# Patient Record
Sex: MALE | Race: BLACK OR AFRICAN AMERICAN | NOT HISPANIC OR LATINO | Employment: OTHER | URBAN - METROPOLITAN AREA
[De-identification: names, ages, dates, MRNs, and addresses within clinical notes are randomized per-mention and may not be internally consistent; named-entity substitution may affect disease eponyms.]

---

## 2017-01-03 ENCOUNTER — HISTORICAL (OUTPATIENT)
Dept: ADMINISTRATIVE | Facility: HOSPITAL | Age: 57
End: 2017-01-03

## 2017-01-14 ENCOUNTER — HISTORICAL (OUTPATIENT)
Dept: ADMINISTRATIVE | Facility: HOSPITAL | Age: 57
End: 2017-01-14

## 2017-02-09 ENCOUNTER — HISTORICAL (OUTPATIENT)
Dept: ADMINISTRATIVE | Facility: HOSPITAL | Age: 57
End: 2017-02-09

## 2017-03-15 LAB — D DIMER PPP IA.FEU-MCNC: 0.38 MCG/ML FEU

## 2017-03-17 ENCOUNTER — HISTORICAL (OUTPATIENT)
Dept: ADMINISTRATIVE | Facility: HOSPITAL | Age: 57
End: 2017-03-17

## 2017-04-04 ENCOUNTER — HISTORICAL (OUTPATIENT)
Dept: ADMINISTRATIVE | Facility: HOSPITAL | Age: 57
End: 2017-04-04

## 2017-04-10 ENCOUNTER — HISTORICAL (OUTPATIENT)
Dept: ADMINISTRATIVE | Facility: HOSPITAL | Age: 57
End: 2017-04-10

## 2017-05-11 ENCOUNTER — HISTORICAL (OUTPATIENT)
Dept: ADMINISTRATIVE | Facility: HOSPITAL | Age: 57
End: 2017-05-11

## 2017-05-11 LAB
ABS NEUT (OLG): 3.4 X10(3)/MCL (ref 2.1–9.2)
ALBUMIN SERPL-MCNC: 3.6 GM/DL (ref 3.4–5)
ALBUMIN/GLOB SERPL: 1 {RATIO}
ALP SERPL-CCNC: 61 UNIT/L (ref 20–120)
ALT SERPL-CCNC: 69 UNIT/L
AST SERPL-CCNC: 77 UNIT/L
BASOPHILS # BLD AUTO: 0.03 X10(3)/MCL
BASOPHILS NFR BLD AUTO: 0 % (ref 0–1)
BILIRUB SERPL-MCNC: 1 MG/DL
BILIRUBIN DIRECT+TOT PNL SERPL-MCNC: 0.1 MG/DL
BILIRUBIN DIRECT+TOT PNL SERPL-MCNC: 0.9 MG/DL
BNP BLD-MCNC: 1610 PG/ML (ref 0–100)
BUN SERPL-MCNC: 18 MG/DL (ref 7–25)
CALCIUM SERPL-MCNC: 8.6 MG/DL (ref 8.4–10.3)
CHLORIDE SERPL-SCNC: 109 MMOL/L (ref 96–110)
CO2 SERPL-SCNC: 25 MMOL/L (ref 24–32)
CREAT SERPL-MCNC: 1.26 MG/DL (ref 0.7–1.4)
EOSINOPHIL # BLD AUTO: 0.08 X10(3)/MCL
EOSINOPHIL NFR BLD AUTO: 1 % (ref 0–5)
ERYTHROCYTE [DISTWIDTH] IN BLOOD BY AUTOMATED COUNT: 16.5 % (ref 11.5–14.5)
GLOBULIN SER-MCNC: 3.1 GM/ML (ref 2.3–3.5)
GLUCOSE SERPL-MCNC: 93 MG/DL (ref 65–99)
HCT VFR BLD AUTO: 31.7 % (ref 40–51)
HGB BLD-MCNC: 10.3 GM/DL (ref 13.5–17.5)
IMM GRANULOCYTES # BLD AUTO: 0.01 10*3/UL
IMM GRANULOCYTES NFR BLD AUTO: 0 %
LYMPHOCYTES # BLD AUTO: 1.68 X10(3)/MCL
LYMPHOCYTES NFR BLD AUTO: 30 % (ref 15–40)
MAGNESIUM SERPL-MCNC: 1.7 MG/DL (ref 1.5–2.6)
MCH RBC QN AUTO: 29.6 PG (ref 26–34)
MCHC RBC AUTO-ENTMCNC: 32.5 GM/DL (ref 31–37)
MCV RBC AUTO: 91.1 FL (ref 80–100)
MONOCYTES # BLD AUTO: 0.38 X10(3)/MCL
MONOCYTES NFR BLD AUTO: 7 % (ref 4–12)
NEUTROPHILS # BLD AUTO: 3.4 X10(3)/MCL
NEUTROPHILS NFR BLD AUTO: 61 X10(3)/MCL
PLATELET # BLD AUTO: 235 X10(3)/MCL (ref 130–400)
PMV BLD AUTO: 9.4 FL (ref 7.4–10.4)
POC TROPONIN: 0.08 NG/ML (ref 0–0.08)
POTASSIUM SERPL-SCNC: 4 MMOL/L (ref 3.6–5.2)
PROT SERPL-MCNC: 6.7 GM/DL (ref 6–8)
RBC # BLD AUTO: 3.48 X10(6)/MCL (ref 4.5–5.9)
SODIUM SERPL-SCNC: 138 MMOL/L (ref 135–146)
TROPONIN I SERPL-MCNC: 0.1 NG/ML
TROPONIN I SERPL-MCNC: 0.11 NG/ML
WBC # SPEC AUTO: 5.6 X10(3)/MCL (ref 4.5–11)

## 2017-05-12 LAB
ABS NEUT (OLG): 4.23 X10(3)/MCL (ref 2.1–9.2)
BASOPHILS # BLD AUTO: 0.03 X10(3)/MCL
BASOPHILS NFR BLD AUTO: 0 % (ref 0–1)
BUN SERPL-MCNC: 17 MG/DL (ref 7–25)
BUN SERPL-MCNC: 18 MG/DL (ref 7–25)
CALCIUM SERPL-MCNC: 8.3 MG/DL (ref 8.4–10.3)
CALCIUM SERPL-MCNC: 9 MG/DL (ref 8.4–10.3)
CHLORIDE SERPL-SCNC: 102 MMOL/L (ref 96–110)
CHLORIDE SERPL-SCNC: 106 MMOL/L (ref 96–110)
CO2 SERPL-SCNC: 25 MMOL/L (ref 24–32)
CO2 SERPL-SCNC: 29 MMOL/L (ref 24–32)
CREAT SERPL-MCNC: 1.41 MG/DL (ref 0.7–1.4)
CREAT SERPL-MCNC: 1.51 MG/DL (ref 0.7–1.4)
EOSINOPHIL # BLD AUTO: 0.11 X10(3)/MCL
EOSINOPHIL NFR BLD AUTO: 2 % (ref 0–5)
ERYTHROCYTE [DISTWIDTH] IN BLOOD BY AUTOMATED COUNT: 16.4 % (ref 11.5–14.5)
GLUCOSE SERPL-MCNC: 164 MG/DL (ref 65–99)
GLUCOSE SERPL-MCNC: 95 MG/DL (ref 65–99)
HCT VFR BLD AUTO: 30.4 % (ref 40–51)
HGB BLD-MCNC: 9.9 GM/DL (ref 13.5–17.5)
IMM GRANULOCYTES # BLD AUTO: 0.01 10*3/UL
IMM GRANULOCYTES NFR BLD AUTO: 0 %
LYMPHOCYTES # BLD AUTO: 1.94 X10(3)/MCL
LYMPHOCYTES NFR BLD AUTO: 29 % (ref 15–40)
MAGNESIUM SERPL-MCNC: 1.7 MG/DL (ref 1.5–2.6)
MCH RBC QN AUTO: 29.3 PG (ref 26–34)
MCHC RBC AUTO-ENTMCNC: 32.6 GM/DL (ref 31–37)
MCV RBC AUTO: 89.9 FL (ref 80–100)
MONOCYTES # BLD AUTO: 0.33 X10(3)/MCL
MONOCYTES NFR BLD AUTO: 5 % (ref 4–12)
NEUTROPHILS # BLD AUTO: 4.23 X10(3)/MCL
NEUTROPHILS NFR BLD AUTO: 63 X10(3)/MCL
PLATELET # BLD AUTO: 231 X10(3)/MCL (ref 130–400)
PMV BLD AUTO: 10.7 FL (ref 7.4–10.4)
POTASSIUM SERPL-SCNC: 3.2 MMOL/L (ref 3.6–5.2)
POTASSIUM SERPL-SCNC: 4.3 MMOL/L (ref 3.6–5.2)
RBC # BLD AUTO: 3.38 X10(6)/MCL (ref 4.5–5.9)
SODIUM SERPL-SCNC: 138 MMOL/L (ref 135–146)
SODIUM SERPL-SCNC: 138 MMOL/L (ref 135–146)
TROPONIN I SERPL-MCNC: 0.07 NG/ML
TROPONIN I SERPL-MCNC: 0.09 NG/ML
WBC # SPEC AUTO: 6.6 X10(3)/MCL (ref 4.5–11)

## 2017-07-05 ENCOUNTER — HISTORICAL (OUTPATIENT)
Dept: ADMINISTRATIVE | Facility: HOSPITAL | Age: 57
End: 2017-07-05

## 2017-07-05 LAB
ABS NEUT (OLG): 5.01 X10(3)/MCL (ref 2.1–9.2)
ALBUMIN SERPL-MCNC: 3.9 GM/DL (ref 3.4–5)
ALBUMIN/GLOB SERPL: 1 RATIO (ref 1–2)
ALP SERPL-CCNC: 61 UNIT/L (ref 45–117)
ALT SERPL-CCNC: 25 UNIT/L (ref 12–78)
AMPHET UR QL SCN: NEGATIVE
APPEARANCE, UA: CLEAR
APTT PPP: 29.2 SECOND(S) (ref 23.3–37)
AST SERPL-CCNC: 32 UNIT/L (ref 15–37)
BACTERIA #/AREA URNS AUTO: NORMAL /[HPF]
BARBITURATE SCN PRESENT UR: NEGATIVE
BASOPHILS # BLD AUTO: 0.03 X10(3)/MCL
BASOPHILS NFR BLD AUTO: 0 % (ref 0–1)
BENZODIAZ UR QL SCN: NEGATIVE
BILIRUB SERPL-MCNC: 0.6 MG/DL (ref 0.2–1)
BILIRUB UR QL STRIP: NORMAL MG/DL
BILIRUBIN DIRECT+TOT PNL SERPL-MCNC: 0.2 MG/DL
BILIRUBIN DIRECT+TOT PNL SERPL-MCNC: 0.4 MG/DL
BNP BLD-MCNC: 897 PG/ML (ref 0–100)
BUN SERPL-MCNC: 19 MG/DL (ref 7–18)
CALCIUM SERPL-MCNC: 9.2 MG/DL (ref 8.5–10.1)
CANNABINOIDS UR QL SCN: POSITIVE
CHLORIDE SERPL-SCNC: 104 MMOL/L (ref 98–107)
CK MB SERPL-MCNC: 3.9 NG/ML (ref 1–3.6)
CK SERPL-CCNC: 263 UNIT/L (ref 39–308)
CO2 SERPL-SCNC: 27 MMOL/L (ref 21–32)
COCAINE UR QL SCN: POSITIVE
COLOR UR: YELLOW
CREAT SERPL-MCNC: 1.8 MG/DL (ref 0.6–1.3)
EOSINOPHIL # BLD AUTO: 0.02 X10(3)/MCL
EOSINOPHIL NFR BLD AUTO: 0 % (ref 0–5)
ERYTHROCYTE [DISTWIDTH] IN BLOOD BY AUTOMATED COUNT: 18.3 % (ref 11.5–14.5)
GLOBULIN SER-MCNC: 3.9 GM/ML (ref 2.3–3.5)
GLUCOSE (UA): NORMAL MG/DL
GLUCOSE SERPL-MCNC: 94 MG/DL (ref 74–106)
HCT VFR BLD AUTO: 34.3 % (ref 40–51)
HGB BLD-MCNC: 11.5 GM/DL (ref 13.5–17.5)
HGB UR QL STRIP: NORMAL /MCL
IMM GRANULOCYTES # BLD AUTO: 0.01 10*3/UL
IMM GRANULOCYTES NFR BLD AUTO: 0 %
INR PPP: 1.09 (ref 0.9–1.2)
KETONES UR QL STRIP: NORMAL MG/DL
LACTATE SERPL-SCNC: 1.4 MMOL/L (ref 0.4–2)
LEUKOCYTE ESTERASE UR QL STRIP: NORMAL /MCL
LYMPHOCYTES # BLD AUTO: 1.26 X10(3)/MCL
LYMPHOCYTES NFR BLD AUTO: 19 % (ref 15–40)
MAGNESIUM SERPL-MCNC: 2.3 MG/DL (ref 1.8–2.4)
MCH RBC QN AUTO: 28.6 PG (ref 26–34)
MCHC RBC AUTO-ENTMCNC: 33.5 GM/DL (ref 31–37)
MCV RBC AUTO: 85.3 FL (ref 80–100)
MONOCYTES # BLD AUTO: 0.43 X10(3)/MCL
MONOCYTES NFR BLD AUTO: 6 % (ref 4–12)
NEUTROPHILS # BLD AUTO: 5.01 X10(3)/MCL
NEUTROPHILS NFR BLD AUTO: 74 X10(3)/MCL
NITRITE UR QL STRIP: NEGATIVE
OPIATES UR QL SCN: POSITIVE
PCP UR QL: NEGATIVE
PH UR STRIP.AUTO: 5 [PH]
PH UR STRIP: 5 [PH] (ref 4.5–8)
PHOSPHATE SERPL-MCNC: 3.2 MG/DL (ref 2.5–4.9)
PLATELET # BLD AUTO: 324 X10(3)/MCL (ref 130–400)
PMV BLD AUTO: 9.9 FL (ref 7.4–10.4)
POC TROPONIN: 0.11 NG/ML (ref 0–0.08)
POTASSIUM SERPL-SCNC: 4 MMOL/L (ref 3.5–5.1)
PROT SERPL-MCNC: 7.8 GM/DL (ref 6.4–8.2)
PROT UR QL STRIP: NORMAL MG/DL
PROTHROMBIN TIME: 13.9 SECOND(S) (ref 11.9–14.4)
RBC # BLD AUTO: 4.02 X10(6)/MCL (ref 4.5–5.9)
RBC #/AREA URNS AUTO: 0 /[HPF]
SODIUM SERPL-SCNC: 140 MMOL/L (ref 136–145)
SP GR UR STRIP: 1.01 (ref 1–1.03)
SQUAMOUS #/AREA URNS LPF: <1 /[LPF]
TEMPERATURE, URINE (OHS): 24 DEGC
TROPONIN I SERPL-MCNC: 17 NG/ML (ref 0–0.05)
TROPONIN I SERPL-MCNC: 21.8 NG/ML (ref 0–0.05)
TROPONIN I SERPL-MCNC: 6.66 NG/ML (ref 0–0.05)
UROBILINOGEN UR STRIP-ACNC: NORMAL MG/DL
WBC # SPEC AUTO: 6.8 X10(3)/MCL (ref 4.5–11)
WBC #/AREA URNS AUTO: 0 /HPF

## 2017-07-06 LAB
ABS NEUT (OLG): 3.29 X10(3)/MCL (ref 2.1–9.2)
BASOPHILS # BLD AUTO: 0.04 X10(3)/MCL
BASOPHILS NFR BLD AUTO: 1 % (ref 0–1)
BUN SERPL-MCNC: 14 MG/DL (ref 7–18)
CALCIUM SERPL-MCNC: 8.7 MG/DL (ref 8.5–10.1)
CHLORIDE SERPL-SCNC: 105 MMOL/L (ref 98–107)
CO2 SERPL-SCNC: 28 MMOL/L (ref 21–32)
CREAT SERPL-MCNC: 1.4 MG/DL (ref 0.6–1.3)
EOSINOPHIL # BLD AUTO: 0.08 10*3/UL
EOSINOPHIL NFR BLD AUTO: 1 % (ref 0–5)
ERYTHROCYTE [DISTWIDTH] IN BLOOD BY AUTOMATED COUNT: 18.8 % (ref 11.5–14.5)
GLUCOSE SERPL-MCNC: 96 MG/DL (ref 74–106)
HCT VFR BLD AUTO: 32.5 % (ref 40–51)
HGB BLD-MCNC: 10.6 GM/DL (ref 13.5–17.5)
IMM GRANULOCYTES # BLD AUTO: 0.01 10*3/UL
IMM GRANULOCYTES NFR BLD AUTO: 0 %
LYMPHOCYTES # BLD AUTO: 2.45 X10(3)/MCL
LYMPHOCYTES NFR BLD AUTO: 38 % (ref 15–40)
MCH RBC QN AUTO: 28.3 PG (ref 26–34)
MCHC RBC AUTO-ENTMCNC: 32.6 GM/DL (ref 31–37)
MCV RBC AUTO: 86.7 FL (ref 80–100)
MONOCYTES # BLD AUTO: 0.53 X10(3)/MCL
MONOCYTES NFR BLD AUTO: 8 % (ref 4–12)
NEUTROPHILS # BLD AUTO: 3.29 X10(3)/MCL
NEUTROPHILS NFR BLD AUTO: 51 X10(3)/MCL
PLATELET # BLD AUTO: 296 X10(3)/MCL (ref 130–400)
PMV BLD AUTO: 10 FL (ref 7.4–10.4)
POTASSIUM SERPL-SCNC: 4.3 MMOL/L (ref 3.5–5.1)
RBC # BLD AUTO: 3.75 X10(6)/MCL (ref 4.5–5.9)
SODIUM SERPL-SCNC: 140 MMOL/L (ref 136–145)
WBC # SPEC AUTO: 6.4 X10(3)/MCL (ref 4.5–11)

## 2017-07-10 LAB
FINAL CULTURE: NORMAL
FINAL CULTURE: NORMAL

## 2017-09-12 ENCOUNTER — HISTORICAL (OUTPATIENT)
Dept: ADMINISTRATIVE | Facility: HOSPITAL | Age: 57
End: 2017-09-12

## 2017-09-12 LAB
ABS NEUT (OLG): 3.35 X10(3)/MCL (ref 2.1–9.2)
ALBUMIN SERPL-MCNC: 3.4 GM/DL (ref 3.4–5)
ALBUMIN/GLOB SERPL: 1 {RATIO}
ALP SERPL-CCNC: 62 UNIT/L (ref 45–117)
ALT SERPL-CCNC: 36 UNIT/L (ref 16–61)
AMPHET UR QL SCN: NEGATIVE
AST SERPL-CCNC: 42 UNIT/L (ref 15–37)
BARBITURATE SCN PRESENT UR: NEGATIVE
BASOPHILS # BLD AUTO: 0.04 X10(3)/MCL (ref 0–0.2)
BASOPHILS NFR BLD AUTO: 0.6 % (ref 0–0.9)
BENZODIAZ UR QL SCN: NEGATIVE
BILIRUB SERPL-MCNC: 1.3 MG/DL (ref 0.2–1)
BILIRUBIN DIRECT+TOT PNL SERPL-MCNC: 0.3 MG/DL (ref 0–0.2)
BILIRUBIN DIRECT+TOT PNL SERPL-MCNC: 1 MG/DL (ref 0–1)
BNP BLD-MCNC: 2504 PG/ML (ref 0–150)
BUN SERPL-MCNC: 18 MG/DL (ref 7–18)
CALCIUM SERPL-MCNC: 8.2 MG/DL (ref 8.5–10.1)
CANNABINOIDS UR QL SCN: ABNORMAL
CHLORIDE SERPL-SCNC: 107 MMOL/L (ref 98–107)
CK MB SERPL-MCNC: 2.6 NG/ML (ref 0.5–3.6)
CK MB SERPL-MCNC: 3.1 NG/ML (ref 0.5–3.6)
CK MB SERPL-MCNC: 3.3 NG/ML (ref 0.5–3.6)
CK MB SERPL-MCNC: 3.6 NG/ML (ref 0.5–3.6)
CK SERPL-CCNC: 172 UNIT/L (ref 39–308)
CK SERPL-CCNC: 182 UNIT/L (ref 39–308)
CK SERPL-CCNC: 185 UNIT/L (ref 39–308)
CK SERPL-CCNC: 225 UNIT/L (ref 39–308)
CO2 SERPL-SCNC: 24 MMOL/L (ref 21–32)
COCAINE UR QL SCN: ABNORMAL
CREAT SERPL-MCNC: 1.56 MG/DL (ref 0.7–1.3)
EOSINOPHIL # BLD AUTO: 0.07 X10(3)/MCL (ref 0–0.9)
EOSINOPHIL NFR BLD AUTO: 1.1 % (ref 0–6.5)
ERYTHROCYTE [DISTWIDTH] IN BLOOD BY AUTOMATED COUNT: 18.6 % (ref 11.5–17)
GLOBULIN SER-MCNC: 4 GM/DL (ref 2–4)
GLUCOSE SERPL-MCNC: 101 MG/DL (ref 74–106)
HCT VFR BLD AUTO: 33.2 % (ref 42–52)
HGB BLD-MCNC: 10.8 GM/DL (ref 14–18)
IMM GRANULOCYTES # BLD AUTO: 0.02 10*3/UL (ref 0–0.02)
IMM GRANULOCYTES NFR BLD AUTO: 0.3 % (ref 0–0.43)
LIPASE SERPL-CCNC: 123 UNIT/L (ref 73–393)
LYMPHOCYTES # BLD AUTO: 2.65 X10(3)/MCL (ref 0.6–4.6)
LYMPHOCYTES NFR BLD AUTO: 40.2 % (ref 16.2–38.3)
MAGNESIUM SERPL-MCNC: 2.2 MG/DL (ref 1.8–2.4)
MCH RBC QN AUTO: 26.7 PG (ref 27–31)
MCHC RBC AUTO-ENTMCNC: 32.5 GM/DL (ref 33–36)
MCV RBC AUTO: 82.2 FL (ref 80–94)
METHADONE UR QL SCN: NEGATIVE
MONOCYTES # BLD AUTO: 0.46 X10(3)/MCL (ref 0.1–1.3)
MONOCYTES NFR BLD AUTO: 7 % (ref 4.7–11.3)
NEUTROPHILS # BLD AUTO: 3.35 X10(3)/MCL (ref 2.1–9.2)
NEUTROPHILS NFR BLD AUTO: 50.8 % (ref 49.1–73.4)
NRBC BLD AUTO-RTO: 0 % (ref 0–0.2)
OPIATES UR QL SCN: NEGATIVE
PCP UR QL: NEGATIVE
PH UR STRIP.AUTO: 6 [PH] (ref 5–7.5)
PLATELET # BLD AUTO: 256 X10(3)/MCL (ref 130–400)
PMV BLD AUTO: 9.4 FL (ref 7.4–10.4)
POTASSIUM SERPL-SCNC: 3.9 MMOL/L (ref 3.5–5.1)
PROT SERPL-MCNC: 7.1 GM/DL (ref 6.4–8.2)
RBC # BLD AUTO: 4.04 X10(6)/MCL (ref 4.7–6.1)
SODIUM SERPL-SCNC: 139 MMOL/L (ref 136–145)
TROPONIN I SERPL-MCNC: 0.04 NG/ML (ref 0–0.04)
TROPONIN I SERPL-MCNC: 0.05 NG/ML (ref 0–0.04)
TROPONIN I SERPL-MCNC: 0.07 NG/ML (ref 0–0.04)
TROPONIN I SERPL-MCNC: 0.08 NG/ML (ref 0–0.04)
WBC # SPEC AUTO: 6.6 X10(3)/MCL (ref 4.5–11.5)

## 2017-09-13 LAB
BUN SERPL-MCNC: 22 MG/DL (ref 7–18)
CALCIUM SERPL-MCNC: 8 MG/DL (ref 8.5–10.1)
CHLORIDE SERPL-SCNC: 105 MMOL/L (ref 98–107)
CO2 SERPL-SCNC: 26 MMOL/L (ref 21–32)
CREAT SERPL-MCNC: 1.54 MG/DL (ref 0.7–1.3)
GLUCOSE SERPL-MCNC: 89 MG/DL (ref 74–106)
MAGNESIUM SERPL-MCNC: 2.2 MG/DL (ref 1.8–2.4)
POTASSIUM SERPL-SCNC: 4.3 MMOL/L (ref 3.5–5.1)
SODIUM SERPL-SCNC: 138 MMOL/L (ref 136–145)

## 2017-09-14 ENCOUNTER — HISTORICAL (OUTPATIENT)
Dept: ADMINISTRATIVE | Facility: HOSPITAL | Age: 57
End: 2017-09-14

## 2017-09-14 LAB
ABS NEUT (OLG): 4.26 X10(3)/MCL (ref 2.1–9.2)
ALBUMIN SERPL-MCNC: 3.8 GM/DL (ref 3.4–5)
ALBUMIN/GLOB SERPL: 1 RATIO (ref 1–2)
ALP SERPL-CCNC: 70 UNIT/L (ref 45–117)
ALT SERPL-CCNC: 41 UNIT/L (ref 12–78)
AMPHET UR QL SCN: NEGATIVE
APAP SERPL-MCNC: <2 MCG/ML (ref 10–30)
APPEARANCE, UA: CLEAR
AST SERPL-CCNC: 46 UNIT/L (ref 15–37)
BACTERIA #/AREA URNS AUTO: ABNORMAL /[HPF]
BARBITURATE SCN PRESENT UR: NEGATIVE
BASOPHILS # BLD AUTO: 0.05 X10(3)/MCL
BASOPHILS NFR BLD AUTO: 1 % (ref 0–1)
BENZODIAZ UR QL SCN: NEGATIVE
BILIRUB SERPL-MCNC: 1.2 MG/DL (ref 0.2–1)
BILIRUB UR QL STRIP: NEGATIVE
BILIRUBIN DIRECT+TOT PNL SERPL-MCNC: 0.3 MG/DL
BILIRUBIN DIRECT+TOT PNL SERPL-MCNC: 0.9 MG/DL
BUN SERPL-MCNC: 22 MG/DL (ref 7–18)
CALCIUM SERPL-MCNC: 9 MG/DL (ref 8.5–10.1)
CANNABINOIDS UR QL SCN: POSITIVE
CHLORIDE SERPL-SCNC: 102 MMOL/L (ref 98–107)
CK MB SERPL-MCNC: 4.4 NG/ML (ref 1–3.6)
CK SERPL-CCNC: 394 UNIT/L (ref 39–308)
CO2 SERPL-SCNC: 29 MMOL/L (ref 21–32)
COCAINE UR QL SCN: POSITIVE
COLOR UR: YELLOW
CREAT SERPL-MCNC: 1.8 MG/DL (ref 0.6–1.3)
EOSINOPHIL # BLD AUTO: 0.04 10*3/UL
EOSINOPHIL NFR BLD AUTO: 1 % (ref 0–5)
ERYTHROCYTE [DISTWIDTH] IN BLOOD BY AUTOMATED COUNT: 17.9 % (ref 11.5–14.5)
ETHANOL SERPL-MCNC: 5 MG/DL
GLOBULIN SER-MCNC: 3.9 GM/ML (ref 2.3–3.5)
GLUCOSE (UA): NORMAL
GLUCOSE SERPL-MCNC: 112 MG/DL (ref 74–106)
HCT VFR BLD AUTO: 38.2 % (ref 40–51)
HGB BLD-MCNC: 12.4 GM/DL (ref 13.5–17.5)
HGB UR QL STRIP: NEGATIVE
HYALINE CASTS #/AREA URNS LPF: ABNORMAL /[LPF]
IMM GRANULOCYTES # BLD AUTO: 0.01 10*3/UL
IMM GRANULOCYTES NFR BLD AUTO: 0 %
KETONES UR QL STRIP: ABNORMAL
LEUKOCYTE ESTERASE UR QL STRIP: 250 LEU/UL
LYMPHOCYTES # BLD AUTO: 2.4 X10(3)/MCL
LYMPHOCYTES NFR BLD AUTO: 33 % (ref 15–40)
MCH RBC QN AUTO: 26.4 PG (ref 26–34)
MCHC RBC AUTO-ENTMCNC: 32.5 GM/DL (ref 31–37)
MCV RBC AUTO: 81.4 FL (ref 80–100)
MONOCYTES # BLD AUTO: 0.51 X10(3)/MCL
MONOCYTES NFR BLD AUTO: 7 % (ref 4–12)
NEUTROPHILS # BLD AUTO: 4.26 X10(3)/MCL
NEUTROPHILS NFR BLD AUTO: 59 X10(3)/MCL
NITRITE UR QL STRIP: NEGATIVE
OPIATES UR QL SCN: NEGATIVE
PCP UR QL: NEGATIVE
PH UR STRIP.AUTO: 6 [PH] (ref 5–8)
PH UR STRIP: 6.5 [PH] (ref 4.5–8)
PLATELET # BLD AUTO: 366 X10(3)/MCL (ref 130–400)
PMV BLD AUTO: 9.7 FL (ref 7.4–10.4)
POTASSIUM SERPL-SCNC: 4.6 MMOL/L (ref 3.5–5.1)
PROT SERPL-MCNC: 7.7 GM/DL (ref 6.4–8.2)
PROT UR QL STRIP: 10 MG/DL
RBC # BLD AUTO: 4.69 X10(6)/MCL (ref 4.5–5.9)
RBC #/AREA URNS AUTO: ABNORMAL /[HPF]
SALICYLATES SERPL-MCNC: <1.7 MG/DL (ref 2.8–20)
SODIUM SERPL-SCNC: 136 MMOL/L (ref 136–145)
SP GR UR STRIP: 1.01 (ref 1–1.03)
SQUAMOUS #/AREA URNS LPF: ABNORMAL /[LPF]
T4 FREE SERPL-MCNC: 1.22 NG/DL (ref 0.76–1.46)
TEMPERATURE, URINE (OHS): 23.1 DEGC (ref 20–25)
TROPONIN I SERPL-MCNC: 0.05 NG/ML (ref 0–0.05)
TROPONIN I SERPL-MCNC: 0.05 NG/ML (ref 0–0.05)
TSH SERPL-ACNC: 1.16 MIU/L (ref 0.36–3.74)
UROBILINOGEN UR STRIP-ACNC: NORMAL
WBC # SPEC AUTO: 7.3 X10(3)/MCL (ref 4.5–11)
WBC #/AREA URNS AUTO: ABNORMAL /HPF

## 2017-09-27 ENCOUNTER — HISTORICAL (OUTPATIENT)
Dept: ADMINISTRATIVE | Facility: HOSPITAL | Age: 57
End: 2017-09-27

## 2017-09-27 LAB
ABS NEUT (OLG): 3.19 X10(3)/MCL (ref 2.1–9.2)
ALBUMIN SERPL-MCNC: 4 GM/DL (ref 3.4–5)
ALBUMIN/GLOB SERPL: 1 {RATIO}
ALP SERPL-CCNC: 81 UNIT/L (ref 45–117)
ALT SERPL-CCNC: 46 UNIT/L (ref 16–61)
APTT PPP: 20 SECOND(S) (ref 21–30)
AST SERPL-CCNC: 69 UNIT/L (ref 15–37)
BASOPHILS # BLD AUTO: 0.03 X10(3)/MCL (ref 0–0.2)
BASOPHILS NFR BLD AUTO: 0.5 % (ref 0–0.9)
BILIRUB SERPL-MCNC: 0.8 MG/DL (ref 0.2–1)
BILIRUBIN DIRECT+TOT PNL SERPL-MCNC: 0.2 MG/DL (ref 0–0.2)
BILIRUBIN DIRECT+TOT PNL SERPL-MCNC: 0.6 MG/DL (ref 0–1)
BNP BLD-MCNC: 1420 PG/ML (ref 0–150)
BUN SERPL-MCNC: 18 MG/DL (ref 7–18)
CALCIUM SERPL-MCNC: 8.6 MG/DL (ref 8.5–10.1)
CHLORIDE SERPL-SCNC: 108 MMOL/L (ref 98–107)
CO2 SERPL-SCNC: 24 MMOL/L (ref 21–32)
CREAT SERPL-MCNC: 1.59 MG/DL (ref 0.7–1.3)
EOSINOPHIL # BLD AUTO: 0.07 X10(3)/MCL (ref 0–0.9)
EOSINOPHIL NFR BLD AUTO: 1.1 % (ref 0–6.5)
ERYTHROCYTE [DISTWIDTH] IN BLOOD BY AUTOMATED COUNT: 18.6 % (ref 11.5–17)
GLOBULIN SER-MCNC: 4 GM/DL (ref 2–4)
GLUCOSE SERPL-MCNC: 96 MG/DL (ref 74–106)
HCT VFR BLD AUTO: 33.8 % (ref 42–52)
HGB BLD-MCNC: 10.7 GM/DL (ref 14–18)
IMM GRANULOCYTES # BLD AUTO: 0.01 10*3/UL (ref 0–0.02)
IMM GRANULOCYTES NFR BLD AUTO: 0.2 % (ref 0–0.43)
INR PPP: 1.3
LYMPHOCYTES # BLD AUTO: 2.54 X10(3)/MCL (ref 0.6–4.6)
LYMPHOCYTES NFR BLD AUTO: 40 % (ref 16.2–38.3)
MCH RBC QN AUTO: 25.7 PG (ref 27–31)
MCHC RBC AUTO-ENTMCNC: 31.7 GM/DL (ref 33–36)
MCV RBC AUTO: 81.1 FL (ref 80–94)
MONOCYTES # BLD AUTO: 0.51 X10(3)/MCL (ref 0.1–1.3)
MONOCYTES NFR BLD AUTO: 8 % (ref 4.7–11.3)
NEUTROPHILS # BLD AUTO: 3.19 X10(3)/MCL (ref 2.1–9.2)
NEUTROPHILS NFR BLD AUTO: 50.2 % (ref 49.1–73.4)
NRBC BLD AUTO-RTO: 0 % (ref 0–0.2)
PLATELET # BLD AUTO: 340 X10(3)/MCL (ref 130–400)
PMV BLD AUTO: 9.5 FL (ref 7.4–10.4)
POTASSIUM SERPL-SCNC: 3.7 MMOL/L (ref 3.5–5.1)
PROT SERPL-MCNC: 8.3 GM/DL (ref 6.4–8.2)
PROTHROMBIN TIME: 13.5 SECOND(S) (ref 9.8–12)
RBC # BLD AUTO: 4.17 X10(6)/MCL (ref 4.7–6.1)
SODIUM SERPL-SCNC: 138 MMOL/L (ref 136–145)
TROPONIN I SERPL-MCNC: 0.07 NG/ML (ref 0–0.04)
WBC # SPEC AUTO: 6.4 X10(3)/MCL (ref 4.5–11.5)

## 2017-09-28 LAB
ABS NEUT (OLG): 2.49 X10(3)/MCL (ref 2.1–9.2)
BASOPHILS # BLD AUTO: 0.02 X10(3)/MCL (ref 0–0.2)
BASOPHILS NFR BLD AUTO: 0.4 % (ref 0–0.9)
BUN SERPL-MCNC: 19 MG/DL (ref 7–18)
CALCIUM SERPL-MCNC: 8.4 MG/DL (ref 8.5–10.1)
CHLORIDE SERPL-SCNC: 105 MMOL/L (ref 98–107)
CO2 SERPL-SCNC: 26 MMOL/L (ref 21–32)
CREAT SERPL-MCNC: 1.6 MG/DL (ref 0.7–1.3)
EOSINOPHIL # BLD AUTO: 0.09 X10(3)/MCL (ref 0–0.9)
EOSINOPHIL NFR BLD AUTO: 1.8 % (ref 0–6.5)
ERYTHROCYTE [DISTWIDTH] IN BLOOD BY AUTOMATED COUNT: 18 % (ref 11.5–17)
GLUCOSE SERPL-MCNC: 65 MG/DL (ref 74–106)
HCT VFR BLD AUTO: 32.6 % (ref 42–52)
HGB BLD-MCNC: 10.6 GM/DL (ref 14–18)
IMM GRANULOCYTES # BLD AUTO: 0.01 10*3/UL (ref 0–0.02)
IMM GRANULOCYTES NFR BLD AUTO: 0.2 % (ref 0–0.43)
LYMPHOCYTES # BLD AUTO: 1.78 X10(3)/MCL (ref 0.6–4.6)
LYMPHOCYTES NFR BLD AUTO: 36.6 % (ref 16.2–38.3)
MAGNESIUM SERPL-MCNC: 2.1 MG/DL (ref 1.8–2.4)
MCH RBC QN AUTO: 26 PG (ref 27–31)
MCHC RBC AUTO-ENTMCNC: 32.5 GM/DL (ref 33–36)
MCV RBC AUTO: 79.9 FL (ref 80–94)
MONOCYTES # BLD AUTO: 0.48 X10(3)/MCL (ref 0.1–1.3)
MONOCYTES NFR BLD AUTO: 9.9 % (ref 4.7–11.3)
NEUTROPHILS # BLD AUTO: 2.49 X10(3)/MCL (ref 2.1–9.2)
NEUTROPHILS NFR BLD AUTO: 51.1 % (ref 49.1–73.4)
NRBC BLD AUTO-RTO: 0 % (ref 0–0.2)
PLATELET # BLD AUTO: 337 X10(3)/MCL (ref 130–400)
PMV BLD AUTO: 9.6 FL (ref 7.4–10.4)
POTASSIUM SERPL-SCNC: 3.5 MMOL/L (ref 3.5–5.1)
RBC # BLD AUTO: 4.08 X10(6)/MCL (ref 4.7–6.1)
SODIUM SERPL-SCNC: 137 MMOL/L (ref 136–145)
WBC # SPEC AUTO: 4.9 X10(3)/MCL (ref 4.5–11.5)

## 2017-09-29 LAB
ALBUMIN SERPL-MCNC: 3.5 GM/DL (ref 3.4–5)
BUN SERPL-MCNC: 19 MG/DL (ref 7–18)
CALCIUM SERPL-MCNC: 8.5 MG/DL (ref 8.5–10.1)
CHLORIDE SERPL-SCNC: 103 MMOL/L (ref 98–107)
CO2 SERPL-SCNC: 25 MMOL/L (ref 21–32)
CREAT SERPL-MCNC: 1.49 MG/DL (ref 0.7–1.3)
GLUCOSE SERPL-MCNC: 88 MG/DL (ref 74–106)
MAGNESIUM SERPL-MCNC: 2.1 MG/DL (ref 1.8–2.4)
PHOSPHATE SERPL-MCNC: 3.8 MG/DL (ref 2.5–4.9)
POTASSIUM SERPL-SCNC: 3.9 MMOL/L (ref 3.5–5.1)
SODIUM SERPL-SCNC: 136 MMOL/L (ref 136–145)

## 2017-10-01 ENCOUNTER — HISTORICAL (OUTPATIENT)
Dept: ADMINISTRATIVE | Facility: HOSPITAL | Age: 57
End: 2017-10-01

## 2017-10-01 LAB
ABS NEUT (OLG): 3.41 X10(3)/MCL (ref 2.1–9.2)
ALBUMIN SERPL-MCNC: 4.3 GM/DL (ref 3.4–5)
ALBUMIN/GLOB SERPL: 1 RATIO (ref 1–2)
ALP SERPL-CCNC: 82 UNIT/L (ref 45–117)
ALT SERPL-CCNC: 47 UNIT/L (ref 12–78)
AMPHET UR QL SCN: NEGATIVE
APPEARANCE, UA: CLEAR
AST SERPL-CCNC: 50 UNIT/L (ref 15–37)
BACTERIA #/AREA URNS AUTO: ABNORMAL /[HPF]
BARBITURATE SCN PRESENT UR: NEGATIVE
BASOPHILS # BLD AUTO: 0.05 X10(3)/MCL
BASOPHILS NFR BLD AUTO: 1 % (ref 0–1)
BENZODIAZ UR QL SCN: POSITIVE
BILIRUB SERPL-MCNC: 0.7 MG/DL (ref 0.2–1)
BILIRUB UR QL STRIP: NEGATIVE
BILIRUBIN DIRECT+TOT PNL SERPL-MCNC: 0.2 MG/DL
BILIRUBIN DIRECT+TOT PNL SERPL-MCNC: 0.5 MG/DL
BNP BLD-MCNC: 2020 PG/ML (ref 0–100)
BUN SERPL-MCNC: 25 MG/DL (ref 7–18)
CALCIUM SERPL-MCNC: 9.1 MG/DL (ref 8.5–10.1)
CANNABINOIDS UR QL SCN: POSITIVE
CHLORIDE SERPL-SCNC: 101 MMOL/L (ref 98–107)
CK MB SERPL-MCNC: 1.9 NG/ML (ref 1–3.6)
CK SERPL-CCNC: 212 UNIT/L (ref 39–308)
CO2 SERPL-SCNC: 26 MMOL/L (ref 21–32)
COCAINE UR QL SCN: POSITIVE
COLOR UR: YELLOW
CREAT SERPL-MCNC: 1.8 MG/DL (ref 0.6–1.3)
EOSINOPHIL # BLD AUTO: 0.04 X10(3)/MCL
EOSINOPHIL NFR BLD AUTO: 1 % (ref 0–5)
ERYTHROCYTE [DISTWIDTH] IN BLOOD BY AUTOMATED COUNT: 18.4 % (ref 11.5–14.5)
GLOBULIN SER-MCNC: 4.4 GM/ML (ref 2.3–3.5)
GLUCOSE (UA): NORMAL
GLUCOSE SERPL-MCNC: 76 MG/DL (ref 74–106)
HCT VFR BLD AUTO: 36.2 % (ref 40–51)
HGB BLD-MCNC: 11.8 GM/DL (ref 13.5–17.5)
HGB UR QL STRIP: NEGATIVE
HYALINE CASTS #/AREA URNS LPF: ABNORMAL /[LPF]
IMM GRANULOCYTES # BLD AUTO: 0.01 10*3/UL
IMM GRANULOCYTES NFR BLD AUTO: 0 %
INR PPP: 1.07 (ref 0.9–1.2)
KETONES UR QL STRIP: NEGATIVE
LEUKOCYTE ESTERASE UR QL STRIP: 75 LEU/UL
LYMPHOCYTES # BLD AUTO: 2.38 X10(3)/MCL
LYMPHOCYTES NFR BLD AUTO: 37 % (ref 15–40)
MCH RBC QN AUTO: 26.7 PG (ref 26–34)
MCHC RBC AUTO-ENTMCNC: 32.6 GM/DL (ref 31–37)
MCV RBC AUTO: 81.9 FL (ref 80–100)
MONOCYTES # BLD AUTO: 0.55 X10(3)/MCL
MONOCYTES NFR BLD AUTO: 8 % (ref 4–12)
NEUTROPHILS # BLD AUTO: 3.41 X10(3)/MCL
NEUTROPHILS NFR BLD AUTO: 53 X10(3)/MCL
NITRITE UR QL STRIP: NEGATIVE
OPIATES UR QL SCN: POSITIVE
PCP UR QL: NEGATIVE
PH UR STRIP.AUTO: 5 [PH] (ref 5–8)
PH UR STRIP: 5 [PH] (ref 4.5–8)
PLATELET # BLD AUTO: 369 X10(3)/MCL (ref 130–400)
PMV BLD AUTO: 9.8 FL (ref 7.4–10.4)
POC TROPONIN: 0.03 NG/ML (ref 0–0.08)
POTASSIUM SERPL-SCNC: 4 MMOL/L (ref 3.5–5.1)
PROT SERPL-MCNC: 8.7 GM/DL (ref 6.4–8.2)
PROT UR QL STRIP: 20 MG/DL
PROTHROMBIN TIME: 13.7 SECOND(S) (ref 11.9–14.4)
RBC # BLD AUTO: 4.42 X10(6)/MCL (ref 4.5–5.9)
RBC #/AREA URNS AUTO: ABNORMAL /[HPF]
SODIUM SERPL-SCNC: 136 MMOL/L (ref 136–145)
SP GR UR STRIP: 1.02 (ref 1–1.03)
SQUAMOUS #/AREA URNS LPF: ABNORMAL /[LPF]
TEMPERATURE, URINE (OHS): 22.9 DEGC (ref 20–25)
UROBILINOGEN UR STRIP-ACNC: NORMAL
WBC # SPEC AUTO: 6.4 X10(3)/MCL (ref 4.5–11)
WBC #/AREA URNS AUTO: ABNORMAL /HPF

## 2017-10-02 ENCOUNTER — HISTORICAL (OUTPATIENT)
Dept: ADMINISTRATIVE | Facility: HOSPITAL | Age: 57
End: 2017-10-02

## 2017-10-02 LAB
ALBUMIN SERPL-MCNC: 3.4 GM/DL (ref 3.4–5)
ALBUMIN/GLOB SERPL: 1 {RATIO}
ALP SERPL-CCNC: 68 UNIT/L (ref 45–117)
ALT SERPL-CCNC: 35 UNIT/L (ref 16–61)
ANISOCYTOSIS BLD QL SMEAR: ABNORMAL
AST SERPL-CCNC: 42 UNIT/L (ref 15–37)
BILIRUB SERPL-MCNC: 0.5 MG/DL (ref 0.2–1)
BILIRUBIN DIRECT+TOT PNL SERPL-MCNC: 0.2 MG/DL (ref 0–0.2)
BILIRUBIN DIRECT+TOT PNL SERPL-MCNC: 0.3 MG/DL (ref 0–1)
BUN SERPL-MCNC: 28 MG/DL (ref 7–18)
CALCIUM SERPL-MCNC: 8.5 MG/DL (ref 8.5–10.1)
CHLORIDE SERPL-SCNC: 106 MMOL/L (ref 98–107)
CK MB SERPL-MCNC: 2 NG/ML (ref 0.5–3.6)
CO2 SERPL-SCNC: 28 MMOL/L (ref 21–32)
CREAT SERPL-MCNC: 2.07 MG/DL (ref 0.7–1.3)
EOSINOPHIL NFR BLD MANUAL: 1 % (ref 0–5)
ERYTHROCYTE [DISTWIDTH] IN BLOOD BY AUTOMATED COUNT: 18.6 % (ref 11.5–17)
GLOBULIN SER-MCNC: 4 GM/DL (ref 2–4)
GLUCOSE SERPL-MCNC: 78 MG/DL (ref 74–106)
HCT VFR BLD AUTO: 31.3 % (ref 42–52)
HGB BLD-MCNC: 10.4 GM/DL (ref 14–18)
HYPOCHROMIA BLD QL SMEAR: ABNORMAL
LYMPHOCYTES NFR BLD MANUAL: 2 %
LYMPHOCYTES NFR BLD MANUAL: 49 % (ref 24–44)
MCH RBC QN AUTO: 26.7 PG (ref 27–31)
MCHC RBC AUTO-ENTMCNC: 33.2 GM/DL (ref 33–36)
MCV RBC AUTO: 80.5 FL (ref 80–94)
MONOCYTES NFR BLD MANUAL: 6 % (ref 4–8)
NEUTROPHILS NFR BLD MANUAL: 44 % (ref 47–80)
PLATELET # BLD AUTO: 277 X10(3)/MCL (ref 130–400)
PLATELET # BLD EST: ADEQUATE 10*3/UL
PMV BLD AUTO: 9.3 FL (ref 7.4–10.4)
POIKILOCYTOSIS BLD QL SMEAR: ABNORMAL
POTASSIUM SERPL-SCNC: 3.6 MMOL/L (ref 3.5–5.1)
PROT SERPL-MCNC: 7.2 GM/DL (ref 6.4–8.2)
RBC # BLD AUTO: 3.89 X10(6)/MCL (ref 4.7–6.1)
RBC MORPH BLD: ABNORMAL
SODIUM SERPL-SCNC: 141 MMOL/L (ref 136–145)
TARGETS BLD QL SMEAR: ABNORMAL
TROPONIN I SERPL-MCNC: 0.05 NG/ML (ref 0–0.04)
WBC # SPEC AUTO: 4.7 X10(3)/MCL (ref 4.5–11.5)

## 2017-12-09 ENCOUNTER — HOSPITAL ENCOUNTER (INPATIENT)
Facility: HOSPITAL | Age: 57
LOS: 5 days | Discharge: HOME | DRG: 023 | End: 2017-12-14
Attending: EMERGENCY MEDICINE | Admitting: PSYCHIATRY & NEUROLOGY
Payer: MEDICAID

## 2017-12-09 DIAGNOSIS — R45.1 AGITATION REQUIRING SEDATION PROTOCOL: ICD-10-CM

## 2017-12-09 DIAGNOSIS — I50.43 ACUTE ON CHRONIC COMBINED SYSTOLIC AND DIASTOLIC CONGESTIVE HEART FAILURE: Primary | ICD-10-CM

## 2017-12-09 DIAGNOSIS — I63.411 EMBOLIC STROKE INVOLVING RIGHT MIDDLE CEREBRAL ARTERY: ICD-10-CM

## 2017-12-09 DIAGNOSIS — R07.9 CHEST PAIN: ICD-10-CM

## 2017-12-09 DIAGNOSIS — F14.10 COCAINE ABUSE: ICD-10-CM

## 2017-12-09 DIAGNOSIS — I63.9 STROKE: ICD-10-CM

## 2017-12-09 DIAGNOSIS — I63.412 EMBOLIC STROKE INVOLVING LEFT MIDDLE CEREBRAL ARTERY: ICD-10-CM

## 2017-12-09 DIAGNOSIS — Z95.810 PRESENCE OF AUTOMATIC IMPLANTABLE CARDIOVERTER-DEFIBRILLATOR: ICD-10-CM

## 2017-12-09 DIAGNOSIS — I63.512 ACUTE ISCHEMIC LEFT MCA STROKE: ICD-10-CM

## 2017-12-09 DIAGNOSIS — T50.905A ADVERSE DRUG EFFECT: ICD-10-CM

## 2017-12-09 DIAGNOSIS — I25.10 CAD (CORONARY ARTERY DISEASE): ICD-10-CM

## 2017-12-09 DIAGNOSIS — I50.9 CONGESTIVE HEART FAILURE: ICD-10-CM

## 2017-12-09 LAB
ALBUMIN SERPL BCP-MCNC: 3.3 G/DL
ALBUMIN SERPL BCP-MCNC: 3.4 G/DL
ALP SERPL-CCNC: 57 U/L
ALP SERPL-CCNC: 59 U/L
ALT SERPL W/O P-5'-P-CCNC: 16 U/L
ALT SERPL W/O P-5'-P-CCNC: 18 U/L
ANION GAP SERPL CALC-SCNC: 8 MMOL/L
ANION GAP SERPL CALC-SCNC: 9 MMOL/L
AST SERPL-CCNC: 22 U/L
AST SERPL-CCNC: 22 U/L
BASOPHILS # BLD AUTO: 0.03 K/UL
BASOPHILS NFR BLD: 0.5 %
BILIRUB SERPL-MCNC: 1.4 MG/DL
BILIRUB SERPL-MCNC: 1.4 MG/DL
BNP SERPL-MCNC: 2460 PG/ML
BUN SERPL-MCNC: 12 MG/DL
BUN SERPL-MCNC: 13 MG/DL
CALCIUM SERPL-MCNC: 8.7 MG/DL
CALCIUM SERPL-MCNC: 9.2 MG/DL
CHLORIDE SERPL-SCNC: 105 MMOL/L
CHLORIDE SERPL-SCNC: 105 MMOL/L
CO2 SERPL-SCNC: 23 MMOL/L
CO2 SERPL-SCNC: 25 MMOL/L
CREAT SERPL-MCNC: 1.3 MG/DL
CREAT SERPL-MCNC: 1.4 MG/DL
DIFFERENTIAL METHOD: ABNORMAL
EOSINOPHIL # BLD AUTO: 0 K/UL
EOSINOPHIL NFR BLD: 0.5 %
ERYTHROCYTE [DISTWIDTH] IN BLOOD BY AUTOMATED COUNT: 23.6 %
EST. GFR  (AFRICAN AMERICAN): >60 ML/MIN/1.73 M^2
EST. GFR  (AFRICAN AMERICAN): >60 ML/MIN/1.73 M^2
EST. GFR  (NON AFRICAN AMERICAN): 55.8 ML/MIN/1.73 M^2
EST. GFR  (NON AFRICAN AMERICAN): >60 ML/MIN/1.73 M^2
ESTIMATED AVG GLUCOSE: 105 MG/DL
GLUCOSE SERPL-MCNC: 82 MG/DL
GLUCOSE SERPL-MCNC: 86 MG/DL
HBA1C MFR BLD HPLC: 5.3 %
HCT VFR BLD AUTO: 32.8 %
HGB BLD-MCNC: 11 G/DL
IMM GRANULOCYTES # BLD AUTO: 0.01 K/UL
IMM GRANULOCYTES NFR BLD AUTO: 0.2 %
INR PPP: 1.2
LACTATE SERPL-SCNC: 1 MMOL/L
LIPASE SERPL-CCNC: 16 U/L
LYMPHOCYTES # BLD AUTO: 1.8 K/UL
LYMPHOCYTES NFR BLD: 29.9 %
MAGNESIUM SERPL-MCNC: 1.6 MG/DL
MAGNESIUM SERPL-MCNC: 2 MG/DL
MCH RBC QN AUTO: 27.8 PG
MCHC RBC AUTO-ENTMCNC: 33.5 G/DL
MCV RBC AUTO: 83 FL
MONOCYTES # BLD AUTO: 0.7 K/UL
MONOCYTES NFR BLD: 10.9 %
NEUTROPHILS # BLD AUTO: 3.6 K/UL
NEUTROPHILS NFR BLD: 58 %
NRBC BLD-RTO: 0 /100 WBC
PHOSPHATE SERPL-MCNC: 2.9 MG/DL
PLATELET # BLD AUTO: 299 K/UL
PMV BLD AUTO: 9.5 FL
POCT GLUCOSE: 91 MG/DL (ref 70–110)
POTASSIUM SERPL-SCNC: 3.5 MMOL/L
POTASSIUM SERPL-SCNC: 3.8 MMOL/L
PROT SERPL-MCNC: 6.8 G/DL
PROT SERPL-MCNC: 7 G/DL
PROTHROMBIN TIME: 12.2 SEC
RBC # BLD AUTO: 3.96 M/UL
SODIUM SERPL-SCNC: 136 MMOL/L
SODIUM SERPL-SCNC: 139 MMOL/L
TROPONIN I SERPL DL<=0.01 NG/ML-MCNC: 0.17 NG/ML
TSH SERPL DL<=0.005 MIU/L-ACNC: 0.41 UIU/ML
WBC # BLD AUTO: 6.12 K/UL

## 2017-12-09 PROCEDURE — 96374 THER/PROPH/DIAG INJ IV PUSH: CPT

## 2017-12-09 PROCEDURE — 99284 EMERGENCY DEPT VISIT MOD MDM: CPT | Mod: ,,, | Performed by: EMERGENCY MEDICINE

## 2017-12-09 PROCEDURE — 99233 SBSQ HOSP IP/OBS HIGH 50: CPT | Mod: ,,, | Performed by: PHYSICIAN ASSISTANT

## 2017-12-09 PROCEDURE — 83036 HEMOGLOBIN GLYCOSYLATED A1C: CPT

## 2017-12-09 PROCEDURE — 85730 THROMBOPLASTIN TIME PARTIAL: CPT

## 2017-12-09 PROCEDURE — 25500020 PHARM REV CODE 255: Performed by: EMERGENCY MEDICINE

## 2017-12-09 PROCEDURE — 84100 ASSAY OF PHOSPHORUS: CPT

## 2017-12-09 PROCEDURE — 83735 ASSAY OF MAGNESIUM: CPT

## 2017-12-09 PROCEDURE — 83690 ASSAY OF LIPASE: CPT

## 2017-12-09 PROCEDURE — 83605 ASSAY OF LACTIC ACID: CPT

## 2017-12-09 PROCEDURE — 84443 ASSAY THYROID STIM HORMONE: CPT

## 2017-12-09 PROCEDURE — 20000000 HC ICU ROOM

## 2017-12-09 PROCEDURE — 80053 COMPREHEN METABOLIC PANEL: CPT

## 2017-12-09 PROCEDURE — 83880 ASSAY OF NATRIURETIC PEPTIDE: CPT

## 2017-12-09 PROCEDURE — 80053 COMPREHEN METABOLIC PANEL: CPT | Mod: 91

## 2017-12-09 PROCEDURE — 63600175 PHARM REV CODE 636 W HCPCS: Performed by: PSYCHIATRY & NEUROLOGY

## 2017-12-09 PROCEDURE — 25000003 PHARM REV CODE 250: Performed by: NURSE PRACTITIONER

## 2017-12-09 PROCEDURE — 99223 1ST HOSP IP/OBS HIGH 75: CPT | Mod: ,,, | Performed by: NURSE PRACTITIONER

## 2017-12-09 PROCEDURE — B31RYZZ FLUOROSCOPY OF INTRACRANIAL ARTERIES USING OTHER CONTRAST: ICD-10-PCS | Performed by: PSYCHIATRY & NEUROLOGY

## 2017-12-09 PROCEDURE — 94761 N-INVAS EAR/PLS OXIMETRY MLT: CPT

## 2017-12-09 PROCEDURE — 85025 COMPLETE CBC W/AUTO DIFF WBC: CPT

## 2017-12-09 PROCEDURE — 99285 EMERGENCY DEPT VISIT HI MDM: CPT | Mod: 25

## 2017-12-09 PROCEDURE — 80061 LIPID PANEL: CPT

## 2017-12-09 PROCEDURE — 83735 ASSAY OF MAGNESIUM: CPT | Mod: 91

## 2017-12-09 PROCEDURE — 85610 PROTHROMBIN TIME: CPT

## 2017-12-09 PROCEDURE — 93010 ELECTROCARDIOGRAM REPORT: CPT | Mod: ,,, | Performed by: INTERNAL MEDICINE

## 2017-12-09 PROCEDURE — 03CG3ZZ EXTIRPATION OF MATTER FROM INTRACRANIAL ARTERY, PERCUTANEOUS APPROACH: ICD-10-PCS | Performed by: PSYCHIATRY & NEUROLOGY

## 2017-12-09 PROCEDURE — 84484 ASSAY OF TROPONIN QUANT: CPT

## 2017-12-09 PROCEDURE — 85610 PROTHROMBIN TIME: CPT | Mod: 91

## 2017-12-09 PROCEDURE — 84484 ASSAY OF TROPONIN QUANT: CPT | Mod: 91

## 2017-12-09 RX ORDER — SODIUM CHLORIDE 0.9 % (FLUSH) 0.9 %
5 SYRINGE (ML) INJECTION
Status: DISCONTINUED | OUTPATIENT
Start: 2017-12-09 | End: 2017-12-14 | Stop reason: HOSPADM

## 2017-12-09 RX ORDER — SODIUM CHLORIDE 9 MG/ML
INJECTION, SOLUTION INTRAVENOUS CONTINUOUS
Status: DISCONTINUED | OUTPATIENT
Start: 2017-12-09 | End: 2017-12-11

## 2017-12-09 RX ORDER — CLOPIDOGREL 300 MG/1
300 TABLET, FILM COATED ORAL ONCE
Status: COMPLETED | OUTPATIENT
Start: 2017-12-09 | End: 2017-12-09

## 2017-12-09 RX ORDER — SODIUM CHLORIDE 0.9 % (FLUSH) 0.9 %
3 SYRINGE (ML) INJECTION EVERY 8 HOURS
Status: DISCONTINUED | OUTPATIENT
Start: 2017-12-09 | End: 2017-12-14 | Stop reason: HOSPADM

## 2017-12-09 RX ORDER — IODIXANOL 320 MG/ML
100 INJECTION, SOLUTION INTRAVASCULAR
Status: COMPLETED | OUTPATIENT
Start: 2017-12-09 | End: 2017-12-09

## 2017-12-09 RX ORDER — MIDAZOLAM HYDROCHLORIDE 1 MG/ML
2 INJECTION, SOLUTION INTRAMUSCULAR; INTRAVENOUS ONCE
Status: COMPLETED | OUTPATIENT
Start: 2017-12-09 | End: 2017-12-09

## 2017-12-09 RX ORDER — ASPIRIN 325 MG
325 TABLET ORAL DAILY
Status: DISCONTINUED | OUTPATIENT
Start: 2017-12-09 | End: 2017-12-14 | Stop reason: HOSPADM

## 2017-12-09 RX ORDER — ATORVASTATIN CALCIUM 20 MG/1
40 TABLET, FILM COATED ORAL DAILY
Status: DISCONTINUED | OUTPATIENT
Start: 2017-12-10 | End: 2017-12-11

## 2017-12-09 RX ORDER — ONDANSETRON 2 MG/ML
4 INJECTION INTRAMUSCULAR; INTRAVENOUS EVERY 8 HOURS PRN
Status: DISCONTINUED | OUTPATIENT
Start: 2017-12-09 | End: 2017-12-14 | Stop reason: HOSPADM

## 2017-12-09 RX ORDER — LABETALOL HYDROCHLORIDE 5 MG/ML
10 INJECTION, SOLUTION INTRAVENOUS EVERY 4 HOURS PRN
Status: DISCONTINUED | OUTPATIENT
Start: 2017-12-09 | End: 2017-12-11

## 2017-12-09 RX ORDER — AMOXICILLIN 250 MG
1 CAPSULE ORAL 2 TIMES DAILY
Status: DISCONTINUED | OUTPATIENT
Start: 2017-12-09 | End: 2017-12-14 | Stop reason: HOSPADM

## 2017-12-09 RX ADMIN — IODIXANOL 20 ML: 320 INJECTION, SOLUTION INTRAVASCULAR at 04:12

## 2017-12-09 RX ADMIN — SODIUM CHLORIDE: 0.9 INJECTION, SOLUTION INTRAVENOUS at 08:12

## 2017-12-09 RX ADMIN — CLOPIDOGREL BISULFATE 300 MG: 300 TABLET, FILM COATED ORAL at 11:12

## 2017-12-09 RX ADMIN — STANDARDIZED SENNA CONCENTRATE AND DOCUSATE SODIUM 1 TABLET: 8.6; 5 TABLET, FILM COATED ORAL at 11:12

## 2017-12-09 RX ADMIN — ASPIRIN 325 MG ORAL TABLET 325 MG: 325 PILL ORAL at 11:12

## 2017-12-09 RX ADMIN — IOHEXOL 75 ML: 350 INJECTION, SOLUTION INTRAVENOUS at 04:12

## 2017-12-09 RX ADMIN — MIDAZOLAM HYDROCHLORIDE 2 MG: 1 INJECTION, SOLUTION INTRAMUSCULAR; INTRAVENOUS at 04:12

## 2017-12-09 NOTE — ED NOTES
LOC: The patient is awake, alert and confused with a flat affect, the patient is disoriented x 3 with slurred, rambling speech.  APPEARANCE: Patient resting comfortably and in no acute distress, patient is clean and well groomed, patient's clothing is properly fastened. Pt has a weller catheter to gravity from outside facility. Pt is in a diaper.   SKIN: The skin is warm and dry, patient has normal skin turgor and moist mucus membranes, hematoma noted to left parietal scalp; laceration with dermabond noted; bleeding is controlled.  no breakdown or brusing noted.  MUSKULOSKELETAL: Patient moving upper extremities with no purposeful movement; lower extremities with no purposeful movement and some resistance to gravity, no obvious swelling or deformities noted.  RESPIRATORY: Airway is open and patent, respirations are spontaneous, patient has a normal effort and rate. Breath sounds are diminished and coarse on the left side.   CARDIAC: Normal heart sounds. No peripheral edema. Pace maker/defibrilator noted to left upper chest.   ABDOMEN: Soft and non tender to palpation, no distention noted. Bowel sounds present.  NEURO: See neuro flow sheet.

## 2017-12-09 NOTE — PROCEDURES
Radiology Post-Procedure Note    Pre Op Diagnosis: L MCA occlusion     Post Op Diagnosis: same - treated w thrombectomy to TICI 3    Procedure: Cerebral angiogram and aspiration thrombectomy    Procedure performed by: Wade Flores MD    Written Informed Consent Obtained: Yes    Specimen Removed: YES clot    Estimated Blood Loss: less than 100     Procedure report:     A 8F sheath was placed into the right femoral artery and a 5F Ganga catheter was advanced into the aortic arch.  The right internal carotid  arteries were subselected and angiography of the brain was performed after injection into each of these vessels.    Preliminary interpretation: R M1 occlusion, treated w thrombectomy to TICI 3.  Please see Imaging report for full details.    A right femoral artery angiogram was performed, the sheath removed and hemostasis achieved using angioseal.  No hematoma was present at the time of hemostasis.    The patient tolerated the procedure well.     Wade Flores MD  Vascular and Interventional Neurology Staff  Director of [facility] Stroke Center  [facility]  [phone]

## 2017-12-09 NOTE — ED PROVIDER NOTES
Encounter Date: 12/9/2017       History     Chief Complaint   Patient presents with    transfer     R MCA transfer L facial droop L weakness slurred speech rambling     HPI     57 yr old AAM who was transferred from an outside facility, after he was admitted to the hospital. This morning, he was found down, a CT was performed, and he was diagnosed with a right sided MCA stroke. He was transferred here for evaluation by neurology and the stroke team. Pt is unable to provide a hx of events, so was not a TPA candidate.     Review of patient's allergies indicates:   Allergen Reactions    Bactrim [sulfamethoxazole-trimethoprim] Rash    Ibuprofen Rash    Tramadol Rash     Past Medical History:   Diagnosis Date    Anxiety     Arthritis     Asthma     CHF (congestive heart failure)     Coronary artery disease     Depression     Hyperlipidemia     Hypertension     ZAMZAM (obstructive sleep apnea)      Past Surgical History:   Procedure Laterality Date    CARDIAC PACEMAKER PLACEMENT      CARDIAC PACEMAKER PLACEMENT      ELBOW FRACTURE SURGERY Right     FRACTURE SURGERY      stent       Family History   Problem Relation Age of Onset    Diabetes Mellitus Mother     Hypertension Mother     Heart disease Mother     Cancer Father     Hypertension Father     Heart disease Father     Diabetes Mellitus Father      Social History   Substance Use Topics    Smoking status: Current Every Day Smoker     Packs/day: 0.03     Types: Cigarettes    Smokeless tobacco: Never Used    Alcohol use No     Review of Systems   Constitutional: Positive for activity change. Negative for appetite change.   HENT: Negative for congestion and ear pain.    Eyes: Negative for pain and redness.   Respiratory: Negative for cough and shortness of breath.    Cardiovascular: Negative for chest pain and leg swelling.   Gastrointestinal: Negative for abdominal distention and abdominal pain.   Genitourinary: Negative for difficulty urinating  and dysuria.   Musculoskeletal: Negative for arthralgias and back pain.   Skin: Negative for color change and rash.   Neurological: Positive for speech difficulty and weakness. Negative for light-headedness and headaches.   All other systems reviewed and are negative.      Physical Exam     Initial Vitals [12/09/17 1537]   BP Pulse Resp Temp SpO2   130/84 79 17 97.7 °F (36.5 °C) 100 %      MAP       99.33         Physical Exam    Nursing note and vitals reviewed.  Constitutional: He appears well-developed and well-nourished. He is not diaphoretic. No distress.   HENT:   Head: Normocephalic and atraumatic.   Mouth/Throat: No oropharyngeal exudate.   Eyes: EOM are normal. Pupils are equal, round, and reactive to light.   Neck: Normal range of motion. Neck supple.   Cardiovascular: Normal rate, regular rhythm and normal heart sounds.   Pulmonary/Chest: Breath sounds normal. No respiratory distress. He has no wheezes.   Abdominal: Soft. He exhibits no distension. There is no tenderness.   Musculoskeletal: Normal range of motion. He exhibits no edema or tenderness.   Neurological: He is alert.   Pt mumbling and rambling, decreased strength of the left upper extremity, left sided facial droop, garbled speech noted, pt unable to give a full hx of the onset of symptoms. Awake, and can answer some questions however not all. Is following simple commands. Normal strength of the RUE, and lower extremities, sensation intact   Skin: Skin is warm and dry. No rash noted. No erythema.         ED Course   Procedures  Labs Reviewed - No data to display       HOIII MDM  A/P: 57 yr old with AMS. Ddx includes but is not limited to stroke, intoxication, withdrawal. Work up - labs, CTA head. Currently pending results, Stroke team at bedside assessing pt. Will continue to monitor.   Fernie Saeed PGY-3 [employer] EM  12/09/2017 3:45 PM      Update:   Pt taken to IR for definitive treatment.   Fernie Saeed PGY-3 [employer] EM  12/09/2017 4:14  PM                           ED Course      Clinical Impression:   The encounter diagnosis was Stroke.                           Fernie Saeed MD  Resident  12/09/17 [id number]

## 2017-12-09 NOTE — CONSULTS
Consulted due to R MCA syndrome.    Favorable scan.  Emergency consent, no family with him nor in chart.    ASA:  1  Mall: 1    To IR for attempt thrombectomy    Wade Flores MD  Vascular and Interventional Neurology Staff  Director of [facility] Stroke Center  [facility]  [phone]

## 2017-12-09 NOTE — PROGRESS NOTES
Hemostasis achieved via right groin with use of AngioSeal closure device.  Pt must remain flat until 1845.

## 2017-12-09 NOTE — ED TRIAGE NOTES
Pt presents for evaluation of right sided MCA after a fall and head trauma at an outside hospital. Pt has slurred, rambling speech, left sided facial droop, no purposeful movement and does not follow commands. Symptoms began after fall.

## 2017-12-10 PROBLEM — F14.229: Status: ACTIVE | Noted: 2017-12-10

## 2017-12-10 PROBLEM — I25.9 CHEST PAIN DUE TO MYOCARDIAL ISCHEMIA: Status: ACTIVE | Noted: 2017-12-10

## 2017-12-10 PROBLEM — G93.6 CYTOTOXIC CEREBRAL EDEMA: Status: ACTIVE | Noted: 2017-12-10

## 2017-12-10 PROBLEM — R45.1 AGITATION: Status: ACTIVE | Noted: 2017-12-10

## 2017-12-10 PROBLEM — F12.10 MILD TETRAHYDROCANNABINOL (THC) ABUSE: Status: ACTIVE | Noted: 2017-12-10

## 2017-12-10 PROBLEM — R45.1 AGITATION REQUIRING SEDATION PROTOCOL: Status: ACTIVE | Noted: 2017-12-10

## 2017-12-10 LAB
ALBUMIN SERPL BCP-MCNC: 2.9 G/DL
ALP SERPL-CCNC: 51 U/L
ALT SERPL W/O P-5'-P-CCNC: 14 U/L
ANION GAP SERPL CALC-SCNC: 8 MMOL/L
ANISOCYTOSIS BLD QL SMEAR: SLIGHT
APTT BLDCRRT: 24.6 SEC
AST SERPL-CCNC: 19 U/L
BACTERIA #/AREA URNS AUTO: ABNORMAL /HPF
BASOPHILS # BLD AUTO: 0.02 K/UL
BASOPHILS NFR BLD: 0.3 %
BILIRUB SERPL-MCNC: 1 MG/DL
BILIRUB UR QL STRIP: NEGATIVE
BUN SERPL-MCNC: 11 MG/DL
BURR CELLS BLD QL SMEAR: ABNORMAL
CA-I BLDV-SCNC: 0.75 MMOL/L
CALCIUM SERPL-MCNC: 8.2 MG/DL
CAOX CRY UR QL COMP ASSIST: ABNORMAL
CHLORIDE SERPL-SCNC: 110 MMOL/L
CHOLEST SERPL-MCNC: 356 MG/DL
CHOLEST/HDLC SERPL: 50.9 {RATIO}
CLARITY UR REFRACT.AUTO: ABNORMAL
CO2 SERPL-SCNC: 22 MMOL/L
COLOR UR AUTO: YELLOW
CREAT SERPL-MCNC: 1.2 MG/DL
DACRYOCYTES BLD QL SMEAR: ABNORMAL
DIFFERENTIAL METHOD: ABNORMAL
EOSINOPHIL # BLD AUTO: 0 K/UL
EOSINOPHIL NFR BLD: 0.6 %
ERYTHROCYTE [DISTWIDTH] IN BLOOD BY AUTOMATED COUNT: 23.9 %
EST. GFR  (AFRICAN AMERICAN): >60 ML/MIN/1.73 M^2
EST. GFR  (NON AFRICAN AMERICAN): >60 ML/MIN/1.73 M^2
GLUCOSE SERPL-MCNC: 79 MG/DL
GLUCOSE UR QL STRIP: NEGATIVE
HCT VFR BLD AUTO: 31.7 %
HDLC SERPL-MCNC: 7 MG/DL
HDLC SERPL: 2 %
HGB BLD-MCNC: 10.5 G/DL
HGB UR QL STRIP: ABNORMAL
HYALINE CASTS UR QL AUTO: 0 /LPF
HYPOCHROMIA BLD QL SMEAR: ABNORMAL
IMM GRANULOCYTES # BLD AUTO: 0.01 K/UL
IMM GRANULOCYTES NFR BLD AUTO: 0.2 %
INR PPP: 1.1
KETONES UR QL STRIP: ABNORMAL
LDLC SERPL CALC-MCNC: ABNORMAL MG/DL
LEUKOCYTE ESTERASE UR QL STRIP: ABNORMAL
LYMPHOCYTES # BLD AUTO: 1.6 K/UL
LYMPHOCYTES NFR BLD: 25.6 %
MAGNESIUM SERPL-MCNC: 1.8 MG/DL
MCH RBC QN AUTO: 28.1 PG
MCHC RBC AUTO-ENTMCNC: 33.1 G/DL
MCV RBC AUTO: 85 FL
MICROSCOPIC COMMENT: ABNORMAL
MONOCYTES # BLD AUTO: 0.8 K/UL
MONOCYTES NFR BLD: 12.8 %
NEUTROPHILS # BLD AUTO: 3.7 K/UL
NEUTROPHILS NFR BLD: 60.5 %
NITRITE UR QL STRIP: NEGATIVE
NONHDLC SERPL-MCNC: 349 MG/DL
NRBC BLD-RTO: 0 /100 WBC
OVALOCYTES BLD QL SMEAR: ABNORMAL
PH UR STRIP: 5 [PH] (ref 5–8)
PHOSPHATE SERPL-MCNC: 2.9 MG/DL
PLATELET # BLD AUTO: 260 K/UL
PLATELET BLD QL SMEAR: ABNORMAL
PMV BLD AUTO: 10.5 FL
POCT GLUCOSE: 79 MG/DL (ref 70–110)
POIKILOCYTOSIS BLD QL SMEAR: SLIGHT
POLYCHROMASIA BLD QL SMEAR: ABNORMAL
POTASSIUM SERPL-SCNC: 2.9 MMOL/L
PROT SERPL-MCNC: 6 G/DL
PROT UR QL STRIP: ABNORMAL
PROTHROMBIN TIME: 11.5 SEC
RBC # BLD AUTO: 3.74 M/UL
RBC #/AREA URNS AUTO: 31 /HPF (ref 0–4)
SODIUM SERPL-SCNC: 140 MMOL/L
SP GR UR STRIP: >=1.03 (ref 1–1.03)
SQUAMOUS #/AREA URNS AUTO: 4 /HPF
TARGETS BLD QL SMEAR: ABNORMAL
TRIGL SERPL-MCNC: 479 MG/DL
TROPONIN I SERPL DL<=0.01 NG/ML-MCNC: 0.09 NG/ML
TROPONIN I SERPL DL<=0.01 NG/ML-MCNC: 0.12 NG/ML
TROPONIN I SERPL DL<=0.01 NG/ML-MCNC: 0.13 NG/ML
URN SPEC COLLECT METH UR: ABNORMAL
UROBILINOGEN UR STRIP-ACNC: NEGATIVE EU/DL
WBC # BLD AUTO: 6.16 K/UL
WBC #/AREA URNS AUTO: 63 /HPF (ref 0–5)

## 2017-12-10 PROCEDURE — 82330 ASSAY OF CALCIUM: CPT

## 2017-12-10 PROCEDURE — 25000003 PHARM REV CODE 250: Performed by: PHYSICIAN ASSISTANT

## 2017-12-10 PROCEDURE — 97165 OT EVAL LOW COMPLEX 30 MIN: CPT

## 2017-12-10 PROCEDURE — 25000003 PHARM REV CODE 250: Performed by: NURSE PRACTITIONER

## 2017-12-10 PROCEDURE — 99233 SBSQ HOSP IP/OBS HIGH 50: CPT | Mod: ,,, | Performed by: INTERNAL MEDICINE

## 2017-12-10 PROCEDURE — 20000000 HC ICU ROOM

## 2017-12-10 PROCEDURE — 99291 CRITICAL CARE FIRST HOUR: CPT | Mod: ,,, | Performed by: ANESTHESIOLOGY

## 2017-12-10 PROCEDURE — 63600175 PHARM REV CODE 636 W HCPCS: Performed by: NURSE PRACTITIONER

## 2017-12-10 PROCEDURE — 94761 N-INVAS EAR/PLS OXIMETRY MLT: CPT

## 2017-12-10 PROCEDURE — 99233 SBSQ HOSP IP/OBS HIGH 50: CPT | Mod: ,,, | Performed by: PSYCHIATRY & NEUROLOGY

## 2017-12-10 PROCEDURE — 97530 THERAPEUTIC ACTIVITIES: CPT

## 2017-12-10 PROCEDURE — G8996 SWALLOW CURRENT STATUS: HCPCS | Mod: CK

## 2017-12-10 PROCEDURE — 85025 COMPLETE CBC W/AUTO DIFF WBC: CPT

## 2017-12-10 PROCEDURE — 97535 SELF CARE MNGMENT TRAINING: CPT

## 2017-12-10 PROCEDURE — 83735 ASSAY OF MAGNESIUM: CPT

## 2017-12-10 PROCEDURE — 92610 EVALUATE SWALLOWING FUNCTION: CPT

## 2017-12-10 PROCEDURE — 81001 URINALYSIS AUTO W/SCOPE: CPT

## 2017-12-10 PROCEDURE — 92523 SPEECH SOUND LANG COMPREHEN: CPT

## 2017-12-10 PROCEDURE — 63600175 PHARM REV CODE 636 W HCPCS: Performed by: PHYSICIAN ASSISTANT

## 2017-12-10 PROCEDURE — 84100 ASSAY OF PHOSPHORUS: CPT

## 2017-12-10 PROCEDURE — 63600175 PHARM REV CODE 636 W HCPCS

## 2017-12-10 PROCEDURE — 84484 ASSAY OF TROPONIN QUANT: CPT

## 2017-12-10 PROCEDURE — G8997 SWALLOW GOAL STATUS: HCPCS | Mod: CJ

## 2017-12-10 PROCEDURE — 80053 COMPREHEN METABOLIC PANEL: CPT

## 2017-12-10 PROCEDURE — 87086 URINE CULTURE/COLONY COUNT: CPT

## 2017-12-10 RX ORDER — MAGNESIUM SULFATE HEPTAHYDRATE 40 MG/ML
2 INJECTION, SOLUTION INTRAVENOUS ONCE
Status: COMPLETED | OUTPATIENT
Start: 2017-12-10 | End: 2017-12-10

## 2017-12-10 RX ORDER — POTASSIUM CHLORIDE 7.45 MG/ML
80 INJECTION INTRAVENOUS ONCE
Status: COMPLETED | OUTPATIENT
Start: 2017-12-10 | End: 2017-12-10

## 2017-12-10 RX ORDER — MORPHINE SULFATE 2 MG/ML
INJECTION, SOLUTION INTRAMUSCULAR; INTRAVENOUS
Status: COMPLETED
Start: 2017-12-10 | End: 2017-12-10

## 2017-12-10 RX ORDER — MORPHINE SULFATE 2 MG/ML
2 INJECTION, SOLUTION INTRAMUSCULAR; INTRAVENOUS ONCE
Status: COMPLETED | OUTPATIENT
Start: 2017-12-10 | End: 2017-12-10

## 2017-12-10 RX ORDER — HALOPERIDOL 5 MG/ML
INJECTION INTRAMUSCULAR
Status: DISCONTINUED
Start: 2017-12-10 | End: 2017-12-10 | Stop reason: WASHOUT

## 2017-12-10 RX ORDER — POTASSIUM CHLORIDE 20 MEQ/15ML
40 SOLUTION ORAL ONCE
Status: DISCONTINUED | OUTPATIENT
Start: 2017-12-10 | End: 2017-12-10

## 2017-12-10 RX ORDER — DEXMEDETOMIDINE HYDROCHLORIDE 4 UG/ML
INJECTION, SOLUTION INTRAVENOUS
Status: DISPENSED
Start: 2017-12-10 | End: 2017-12-10

## 2017-12-10 RX ORDER — POTASSIUM CHLORIDE 7.45 MG/ML
10 INJECTION INTRAVENOUS
Status: COMPLETED | OUTPATIENT
Start: 2017-12-10 | End: 2017-12-10

## 2017-12-10 RX ORDER — MIDAZOLAM HYDROCHLORIDE 1 MG/ML
2 INJECTION INTRAMUSCULAR; INTRAVENOUS ONCE
Status: COMPLETED | OUTPATIENT
Start: 2017-12-10 | End: 2017-12-10

## 2017-12-10 RX ORDER — DEXMEDETOMIDINE HYDROCHLORIDE 4 UG/ML
0.2 INJECTION, SOLUTION INTRAVENOUS CONTINUOUS
Status: DISCONTINUED | OUTPATIENT
Start: 2017-12-10 | End: 2017-12-11

## 2017-12-10 RX ORDER — MIDAZOLAM HYDROCHLORIDE 1 MG/ML
INJECTION INTRAMUSCULAR; INTRAVENOUS
Status: COMPLETED
Start: 2017-12-10 | End: 2017-12-10

## 2017-12-10 RX ADMIN — POTASSIUM CHLORIDE 10 MEQ: 10 INJECTION, SOLUTION INTRAVENOUS at 02:12

## 2017-12-10 RX ADMIN — MORPHINE SULFATE 2 MG: 2 INJECTION, SOLUTION INTRAMUSCULAR; INTRAVENOUS at 09:12

## 2017-12-10 RX ADMIN — MAGNESIUM SULFATE IN WATER 2 G: 40 INJECTION, SOLUTION INTRAVENOUS at 11:12

## 2017-12-10 RX ADMIN — POTASSIUM CHLORIDE 10 MEQ: 10 INJECTION, SOLUTION INTRAVENOUS at 10:12

## 2017-12-10 RX ADMIN — DEXMEDETOMIDINE HYDROCHLORIDE 0.3 MCG/KG/HR: 4 INJECTION, SOLUTION INTRAVENOUS at 01:12

## 2017-12-10 RX ADMIN — POTASSIUM CHLORIDE 10 MEQ: 10 INJECTION, SOLUTION INTRAVENOUS at 04:12

## 2017-12-10 RX ADMIN — POTASSIUM CHLORIDE 10 MEQ: 10 INJECTION, SOLUTION INTRAVENOUS at 06:12

## 2017-12-10 RX ADMIN — MIDAZOLAM HYDROCHLORIDE 2 MG: 1 INJECTION, SOLUTION INTRAMUSCULAR; INTRAVENOUS at 09:12

## 2017-12-10 RX ADMIN — POTASSIUM CHLORIDE 10 MEQ: 10 INJECTION, SOLUTION INTRAVENOUS at 01:12

## 2017-12-10 RX ADMIN — POTASSIUM CHLORIDE 10 MEQ: 10 INJECTION, SOLUTION INTRAVENOUS at 03:12

## 2017-12-10 RX ADMIN — POTASSIUM CHLORIDE 10 MEQ: 10 INJECTION, SOLUTION INTRAVENOUS at 09:12

## 2017-12-10 RX ADMIN — POTASSIUM CHLORIDE 10 MEQ: 10 INJECTION, SOLUTION INTRAVENOUS at 08:12

## 2017-12-10 RX ADMIN — DEXMEDETOMIDINE HYDROCHLORIDE 0.4 MCG/KG/HR: 4 INJECTION, SOLUTION INTRAVENOUS at 09:12

## 2017-12-10 RX ADMIN — MIDAZOLAM HYDROCHLORIDE 2 MG: 1 INJECTION INTRAMUSCULAR; INTRAVENOUS at 09:12

## 2017-12-10 NOTE — ASSESSMENT & PLAN NOTE
Hx: prior CVA  R MCA syndrome  S/p thrombectomy R M1, with TICI 3 flow post procedure  Vasc. Neuro following  Keep SBP <140 for 24h   repeat CT head this evening, pending  If no bleed on CT head, will load with plavix 300mg followed by 75mg daily  And ASA 325mg daily  IVF NS 50cc/h  PT/OT/SLP

## 2017-12-10 NOTE — PLAN OF CARE
Problem: Occupational Therapy Goal  Goal: Occupational Therapy Goal  OT evaluation completed.  LEYDI Bautista  12/10/2017

## 2017-12-10 NOTE — PLAN OF CARE
Problem: Patient Care Overview  Goal: Plan of Care Review  Outcome: Ongoing (interventions implemented as appropriate)  POC reviewed with pt at 0500. Pt refuses plan of care. Patient able to tolerate meds crushed in pudding/applesauce, but no solids, or thin liquids.  Speech eval needed before diet intake.  No family present to review plan of care with. Patient remained confused throughout shift. CT completed, Plavix and aspirin started. Patient started on 75 NS/hr. Sitter in with patient during night due to high fall risk with increased impulsivness. Pt progressing toward goals. Will continue to monitor. See flowsheets for full assessment and VS info.

## 2017-12-10 NOTE — CONSULTS
[facility]  Vascular Neurology  Comprehensive Stroke Center  Consult Note    Inpatient consult to Vascular (Stroke) Neurology  Consult performed by: IZAIAH SHIRLEY  Consult ordered by: KIMBERLY ABAD  Reason for consult: R MCA stroke        Assessment/Plan:     Patient is a 57 y.o. year old male with:    * Embolic stroke involving right middle cerebral artery    Mr. Stewart is a 58yo M transferred for new R MCA syndrome. LKN >4.5 hours when evaluated so no alteplase administered. Upon arrival to [facility], patient had CTA MP showing R MCA LVO so he was brought to IR for thrombectomy.      -Antithrombotics for secondary stroke prevention: Antiplatelets:  Aspirin: 81 mg oral now and daily  -Statins for secondary stroke prevention and hyperlipidemia, if present: Atorvastatin- 40 mg oral daily  -Aggressive risk factor modification: Diet, Exercise, drug abuse  -Rehab Efforts: Physical Therapy, Occupational Therapy, Speech and Language Pathology, Physical Medicine and Rehabilitation  -Diagnostics: Ordered/Pending HgbA1C to assess blood glucose levels   -Lipid Profile to assess cholesterol levels   -MRI head without contrast to assess brain parenchyma   -Trans-thoracic cardiac echo to assess cardiac function/status   -TSH to assess thyroid function  VTE Prophylaxis: Heparin 5000 units SQ every 8 hours         Cocaine abuse    + cocaine on tox screen at OSH            STROKE DOCUMENTATION     Acute Stroke Times   Last Known Normal Date: 12/08/17  Last Known Normal Time:  (unknown LKN - not listed in chart & no family present to confirm)  Symptom Onset Date: 12/09/17  Symptom Onset Time: 0700 (patient found symptomatic at 7am)  Stroke Team Called Date: 12/09/17  Stroke Team Called Time: 1527 (1:30 pm note from telestroke, pt transferred to [facility])  Stroke Team Arrival Date: 12/09/17  Stroke Team Arrival Time: 1528  CT Interpretation Time: 1550  Decision to Treat Time for Alteplase:  (n/a outside  window)  Decision to Treat Time for IR: 1550    NIH Scale:  1a. Level Of Consciousness: 0-->Alert: keenly responsive  1b. LOC Questions: 2-->Answers neither question correctly  1c. LOC Commands: 2-->Performs neither task correctly  2. Best Gaze: 1-->Partial gaze palsy: gaze is abnormal in one or both eyes, but forced deviation or total gaze paresis is not present  3. Visual: 2-->Complete hemianopia  4. Facial Palsy: 2-->Partial paralysis (total or near-total paralysis of lower face)  5a. Motor Arm, Left: 3-->No effort against gravity: limb falls  5b. Motor Arm, Right: 0-->No drift: limb holds 90 (or 45) degrees for full 10 secs  6a. Motor Leg, Left: 4-->No movement  6b. Motor Leg, Right: 1-->Drift: leg falls by the end of the 5-sec period but does not hit bed  7. Limb Ataxia: 0-->Absent  8. Sensory: 2-->Severe to total sensory loss: patient is not aware of being touched in the face, arm, and leg  9. Best Language: 1-->Mild-to-moderate aphasia: some obvious loss of fluency or facility of comprehension, without significant limitation on ideas expressed or form of expression. Reduction of speech and/or comprehension, however, makes conversation. . . (see row details)  10. Dysarthria: 1-->Mild-to-moderate dysarthria: patient slurs at least some words and, at worst, can be understood with some difficulty  11. Extinction and Inattention (formerly Neglect): 1-->Visual, tactile, auditory, spatial, or personal inattention or extinction to bilateral simultaneous stimulation in one of the sensory modalities  Total (NIH Stroke Scale): 22    Modified Sunitha Score: 1  [place] Coma Scale:13   ABCD2 Score:    [id number] Score:   HAS -BLED Score:   ICH Score:   Hunt & Rosa Classification:       Thrombolysis Candidate? No, Out of window       Interventional Revascularization Candidate?   Is the patient eligible for mechanical endovascular reperfusion (ABDI)?  Yes      Hemorrhagic change of an Ischemic Stroke: Does this patient have  "an ischemic stroke with hemorrhagic changes? No     Subjective:     History of Present Illness:  Mr. Stewart is a 56yo M with history of prior L hemispheric stroke, cocaine and marijuana use who is a transfer from [facility] for R MCA syndrome. Patient was admitted to OSH yesterday to rule out MI. Tox screen was positive on admission for cocaine. This morning he fell out of bed and was found with LSW and R gaze preference. Last known normal was >4.5 hours prior to evaluation so he was not a tpa candidate. He was transferred to [facility] and had CTA MP on arrival showing a R M2 occlusion.   No family is at bedside and patient is confused so unable to obtain further history on patient. Attempted to call "relative" Sri listed in chart but no answer.         Past Medical History:   Diagnosis Date    Anxiety     Arthritis     Asthma     CHF (congestive heart failure)     Coronary artery disease     Depression     Hyperlipidemia     Hypertension     ZAMZAM (obstructive sleep apnea)      Past Surgical History:   Procedure Laterality Date    CARDIAC PACEMAKER PLACEMENT      CARDIAC PACEMAKER PLACEMENT      ELBOW FRACTURE SURGERY Right     FRACTURE SURGERY      stent       Family History   Problem Relation Age of Onset    Diabetes Mellitus Mother     Hypertension Mother     Heart disease Mother     Cancer Father     Hypertension Father     Heart disease Father     Diabetes Mellitus Father      Social History   Substance Use Topics    Smoking status: Current Every Day Smoker     Packs/day: 0.03     Types: Cigarettes    Smokeless tobacco: Never Used    Alcohol use No     Review of patient's allergies indicates:   Allergen Reactions    Bactrim [sulfamethoxazole-trimethoprim] Rash    Ibuprofen Rash    Tramadol Rash       Medications: I have reviewed the current medication administration record.    Prescriptions Prior to Admission   Medication Sig Dispense Refill Last Dose    alprazolam (XANAX) 1 MG " tablet Take 1 mg by mouth 2 (two) times daily as needed for Anxiety.  0 3/8/2017    amiodarone (PACERONE) 200 MG Tab Take 200 mg by mouth 2 (two) times daily.  0 3/8/2017    aspirin (ECOTRIN) 81 MG EC tablet Take 81 mg by mouth once daily.   3/8/2017    atorvastatin (LIPITOR) 40 MG tablet Take 40 mg by mouth once daily.   3/8/2017    carvedilol (COREG) 25 MG tablet Take 1 tablet (25 mg total) by mouth 2 (two) times daily with meals. 180 tablet 3 3/8/2017    citalopram (CELEXA) 40 MG tablet Take 40 mg by mouth once daily.   3/8/2017    clopidogrel (PLAVIX) 75 mg tablet Take 1 tablet (75 mg total) by mouth once daily. 30 tablet 11 3/8/2017    furosemide (LASIX) 40 MG tablet TK 1 T PO  QD  2 3/8/2017    hydrOXYzine pamoate (VISTARIL) 50 MG Cap Take 50 mg by mouth every evening.  0 3/8/2017    isosorbide mononitrate (IMDUR) 30 MG 24 hr tablet Take 1 tablet (30 mg total) by mouth once daily. 90 tablet 3 3/8/2017    lisinopril (PRINIVIL,ZESTRIL) 20 MG tablet Take 1 tablet (20 mg total) by mouth once daily. 90 tablet 3 3/8/2017    midodrine (PROAMATINE) 5 MG Tab Take 5 mg by mouth 3 (three) times daily with meals.    3/8/2017    mirtazapine (REMERON) 15 MG tablet Take 2 tablets (30 mg total) by mouth every evening. 90 tablet 3 3/7/2017    potassium chloride SA (K-DUR,KLOR-CON) 20 MEQ tablet Take 20 mEq by mouth 2 (two) times daily.   3/8/2017    predniSONE (DELTASONE) 5 MG tablet Take 5 mg by mouth once daily.   3/8/2017    spironolactone (ALDACTONE) 25 MG tablet Take 1 tablet (25 mg total) by mouth once daily. 90 tablet 3 3/8/2017    VENTOLIN HFA 90 mcg/actuation inhaler INHALE 1 PUFF Q 4 H PRF WHEEZING  3 Taking       Review of Systems   Constitutional: Negative for fever.   HENT: Negative for trouble swallowing.    Eyes: Negative for visual disturbance.   Respiratory: Negative for shortness of breath.    Cardiovascular: Negative for chest pain.   Gastrointestinal: Negative for nausea and vomiting.    Endocrine: Negative for polyuria.   Genitourinary: Negative for dysuria.   Musculoskeletal: Negative for neck pain.   Skin: Negative for rash.   Allergic/Immunologic: Negative for immunocompromised state.   Neurological: Positive for facial asymmetry, speech difficulty and weakness.   Psychiatric/Behavioral: Positive for confusion.     Objective:     Vital Signs (Most Recent):  Temp: 98.6 °F (37 °C) (12/09/17 1915)  Pulse: 73 (12/09/17 2015)  Resp: 14 (12/09/17 2015)  BP: (!) 143/93 (12/09/17 2015)  SpO2: 99 % (12/09/17 2015)    Vital Signs Range (Last 24H):  Temp:  [97.7 °F (36.5 °C)-98.6 °F (37 °C)]   Pulse:  [72-81]   Resp:  [12-42]   BP: (110-143)/()   SpO2:  [93 %-100 %]     Physical Exam   Constitutional: He appears well-developed and well-nourished. No distress.   HENT:   Head: Normocephalic and atraumatic.   Eyes: Pupils are equal, round, and reactive to light.   R gaze preference   Neck: Normal range of motion. Neck supple.   Cardiovascular: Normal rate.    Pulmonary/Chest: Effort normal.   Abdominal: Soft.   Musculoskeletal:   Significant L hemiparesis   Neurological: He is alert.   Aphasic, confused, perseverating on food   Skin: He is not diaphoretic.   Psychiatric: He is inattentive.       Neurological Exam:   LOC: alert  Attention Span: poor  Language: Global aphasia  Articulation: Dysarthria  Orientation: Not oriented to place, Not oriented to time  Visual Fields: Hemianopsia left  EOM (CN III, IV, VI): Gaze preference  right  Pupils (CN II, III): PERRL  Facial Sensation (CN V): Normal  Facial Movement (CN VII): Lower facial weakness on the Left  Gag Reflex: present  Reflexes: not tested  Motor: left hemiparesis 2/5 LUE, 0/5 LLE, 5/5 right extremities  Cebellar: No evidence of appendicular or axial ataxia  Sensation: Zachery-hypoesthesia left  Tone: Flaccid  LUE  and LLE      Laboratory:  CMP:   Recent Labs  Lab 12/09/17  1809   CALCIUM 9.2   ALBUMIN 3.4*   PROT 7.0      K 3.8   CO2 25   CL  105   BUN 12   CREATININE 1.4   ALKPHOS 57   ALT 18   AST 22   BILITOT 1.4*     CBC:   Recent Labs  Lab 12/09/17  1607   WBC 6.12   RBC 3.96*   HGB 11.0*   HCT 32.8*      MCV 83   MCH 27.8   MCHC 33.5     Lipid Panel: No results for input(s): CHOL, LDLCALC, HDL, TRIG in the last 168 hours.  Coagulation:   Recent Labs  Lab 12/09/17  1607   INR 1.2     Hgb A1C: No results for input(s): HGBA1C in the last 168 hours.  TSH:   Recent Labs  Lab 12/09/17  1607   TSH 0.411       Diagnostic Results:      Brain imaging:  CTH 12/9/17: Early R MCA ischemic changes    Vessel Imaging:  CTA  12/9/17:   Focal occlusion of the distal M2 segment of the right MCA, with early ischemic changes in the right middle cerebral artery distribution.  There are findings suggestive of a multiphase CTA score of 4.    Greater than 50% stenosis of the right vertebral artery at the V4 segment, with narrow caliber of the remaining vertebral artery and basilar artery.  The left vertebral artery terminates with its supply to the PICA.  Much of supply to posterior circulation comes via posterior communicating arteries.    Additional findings as above.    Cardiac Evaluation:   Pending 2D ECHO      Kary Porter PA-C  Comprehensive Stroke Center  Department of Vascular Neurology   [facility]

## 2017-12-10 NOTE — H&P
[facility]  Neurocritical Care  History and physical Note    Admit Date: 12/9/2017  Service Date: 12/09/2017  Length of Stay: 0    Subjective:     Chief Complaint: <principal problem not specified>    History of Present Illness: The patient is a 57 yr old AAM with a history of CVA, HTN, CAD, CHF, dyslipidemia, cardiomyopathy, pacemaker, drug (cocaine, marijuana), tobacco and ETOH use. History and record of events obtained from chart as patient is still confused and disoriented post IR procedure.     The patient  developed chest pain this morning and went to OSH where he was admitted and being worked up for an MI.  This morning he fell getting up out of bed, at OSH. He developed left facial droop, left UE/LE weakness, became agitated and had non sensical speech. He was sent for CT of head which showed multiple areas of ischemic change. A telestroke consult was obtained and the neurologist recommended [place] [facility] [place].  His last known normal was 41/2 hours prior to admit [place] [facility]. Urine tox screen was positive for marijuana and cocaine.  On CTA multiphase a right M1 occlusion was found. He was taken to IR for successful thrombectomy RMCA M1 occlusion, with TICI 3 post procedure. He is being admitted to NICU s/p thrombectomy.    Hospital Course: 12/9 admit to NICU s/p thrombectomy RMCA M1, with TICI 3 post procedure. Will Keep SBP <140 for 24h. CT head pending this evening    Vitals:   Temp: 98.6 °F (37 °C)  Pulse: 73  Rhythm: normal sinus rhythm  BP: (!) 140/93  MAP (mmHg): 111  Resp: 12  SpO2: 99 %    Temp  Min: 97.7 °F (36.5 °C)  Max: 98.6 °F (37 °C)  Pulse  Min: 72  Max: 81  BP  Min: 110/70  Max: 140/93  MAP (mmHg)  Min: 90  Max: 114  Resp  Min: 12  Max: 42  SpO2  Min: 93 %  Max: 100 %    No intake/output data recorded.   Unmeasured Output  Stool Occurrence: 0     Examination:   Constitutional: disoriented Well-nourished and -developed. No apparent distress.   Eyes:  Conjunctiva clear, anicteric. Lids no lesions.  Head/Ears/Nose/Mouth/Throat/Neck: Moist mucous membranes. External ears, nose atraumatic.   Cardiovascular:  Has PM. In Regular rhythm. No murmurs. No leg edema.  Respiratory: Comfortable respirations. Clear to auscultation.  Gastrointestinal: No hernia. Soft, nondistended, nontender. + bowel sounds.    Neurologic:   -[id number]  -Alert. Oriented to self, disoriented to place and time. dysarthrict. Follows commands.   -Cranial nerves II-XII grossly intact  -Strength 5/5 RUE/RLE. 4/5LUE/LLE moves all extremities spontaneously and to command  -Sensation bilat intact to soft touch in arms, legs.    Today I independently reviewed pertinent medications, lines/drains/airways, imaging, lab results, microbiology results, notably:   Assessment/Plan:     Neuro   Embolic stroke involving right middle cerebral artery    Hx: prior CVA  R MCA syndrome  S/p thrombectomy R M1, with TICI 3 flow post procedure  Vasc. Neuro following  Keep SBP <140 for 24h   repeat CT head this evening, pending  If no bleed on CT head, will load with plavix 300mg followed by 75mg daily  And ASA 325mg daily  IVF NS 50cc/h  PT/OT/SLP          Psychiatric   Anxiety    On xanax 1mg bid at home        Other   Chest pain    Hx CAD, CHF, PM  ECG pending  Echo pending  Trend serial troponin            Prophylaxis:  Venous Thromboembolism: mechanical  Stress Ulcer: NA  Ventilator Pneumonia: not applicable     Activity Orders          None        Full Code    I have spent 35 min with this patient, with over 50% of this time spent coordinating care and speaking with the family    Sheila Núñez NP  Neurocritical Care  [facility]

## 2017-12-10 NOTE — PT/OT/SLP EVAL
"Speech Language Pathology Evaluation  Cognitive/Bedside Swallow    Patient Name:  Jana Stewart   MRN:  [id number]   [id number] A    Admitting Diagnosis: Embolic stroke involving right middle cerebral artery    Recommendations:                  General Recommendations:  Dysphagia therapy  Diet recommendations:  Puree, Thin   Aspiration Precautions: Meds crushed in puree, No straws and Standard aspiration precautions   General Precautions: Standard, aspiration, pureed diet, fall    History:     Past Medical History:   Diagnosis Date    Anxiety     Arthritis     Asthma     CHF (congestive heart failure)     Coronary artery disease     Depression     Hyperlipidemia     Hypertension     ZAMZAM (obstructive sleep apnea)        Past Surgical History:   Procedure Laterality Date    CARDIAC PACEMAKER PLACEMENT      CARDIAC PACEMAKER PLACEMENT      ELBOW FRACTURE SURGERY Right     FRACTURE SURGERY      stent         Social History: Unable to obtain 2/2 dec'd willingness to participate in evaluation.     Prior diet: Unable to obtain 2/2 dec'd willingness to participate in evaluation.     Subjective     "You do that to kids, I ain't no kid. Like you tryin to bribe me. I'm not gonna bull sh**." Pt repeated throughout session upon refusal to participate described below.     Pain/Comfort:  · Pain Rating 1: 0/10  · Pain Rating Post-Intervention 1: 0/10    Objective:     Cognitive Status:    · Orientation: recalled hospital and year. Reported "prolly [birth year] cause that's my birthday" when asked to recall month, appearing likely 2/2 dec'd willingness to participate observed throughout session. As he refused re-direction to task, instead repeating, "You do that to kids, I ain't no kid. Like you tryin to bribe me. I'm not gonna bull sh**."  · Long term memory- Refused to attempt to provide response to q's.   · Immediate memory- Refused to recall series of 3, 1-digit numbers.   · Problem solving- Answered 1/3 basic q's. " Refused to attempt to provide response x2.      Receptive Language:   · Complex y/n q's- answered 2/2 q's, however refused additional trials.   · 2-step directions- refused to attempt to follow  · 1-step directions- followed 1/3. Completion of trials provided x2 appeared limited by increasing agitation, pt made no attempt to complete.     Pragmatics:    · Agitated  · Distractible  · Impulsive  · Uncooperative    Expressive Language:  · Responsive naming q's- answered 1/3. Refused to attempt to provide response to q's x2.       Motor Speech:  · No evidence of apraxia. Dysarthria not evident in spontaneous speech. However, noted during oral phase of swallow observed during clinical evaluation of swallow.     Voice:   · Clear, dry    Oral Musculature Evaluation  · Oral Musculature: unable to assess due to poor participation/comprehension, right weakness  · Volitional Cough: EDUARDO 2/2 dec'd participation  · Volitional Swallow: EDUARDO 2/2 dec'd participation   · Voice Prior to PO Intake: Clear, dry    Bedside Swallow Eval:   Consistencies Assessed:  · Thin water- 6oz cup sips  · Pureed chocolate pudding- tsps full container  · Solid judi cracker- bite x1    Oral Phase:   Pt appearing impulsive, attempting to place 3/4 of cracker in mouth, requiring SLP removal. Prolonged mastication and slow oral transit with dec'd bolus formation as evidenced by buccal pocketing observed upon pt talking post swallow. Pt refused to follow clinician prompts to clear, req'd cup sip water x2-3. Following cup sips, pt refused to follow commands in order for clinician to observe oral cavity for clearance. However, appeared to have cleared given clarity of speech as compared to altered speech observed when cracker was knowingly pocketed.      Pharyngeal Phase:   · No overt clinical signs of aspiration.     Treatment:   Pt asleep upon entry, easily awakened with light verbal stimulation. Pt in reclined, right side lying position with head resting  on pillow against bed rail. Pt followed commands to achieve and maintain midline head position for PO trials. However he refused to right himself and for HOB to be raised. Of note, pt also refused trials with straw. Education provided re: role of SLP and SLP POC.     Given refusal to implement aspiration precautions this evaluation, impulsivity, prolonged oral phase of swallow with buccal pocketing observed, recommend pureed diet and thin liquids. Results discussed with NSG.     Assessment:     Jana Stewart is a 57 y.o. male with an SLP diagnosis of oral phase dysphagia. Unable to determine cognitive-linguistic status 2/2 dec'd willingness to participate in evaluation, however likely somewhat altered.     Goals:    SLP Goals        Problem: SLP Goal    Goal Priority Disciplines Outcome   SLP Goal     SLP Ongoing (interventions implemented as appropriate)   Description:  Speech Language Pathology  Goals expected to be met by 12/17:  1. Pt will tolerate pureed diet and thin liquids with no clinical signs of aspiration.   2. Pt will tolerate trials of regular consistencies without buccal pocketing and timely mastication in order to determine if appropriate for diet advancement.                     Plan:     · Patient to be seen:  3 x/week   · Plan of Care expires:  01/08/18  · Plan of Care reviewed with:  patient   · SLP Follow-Up:  Yes       Discharge recommendations:  Discharge Facility/Level Of Care Needs:  (Pending PT/OT recs)     Time Tracking:     SLP Treatment Date:   12/10/17  Speech Start Time:  0806  Speech Stop Time:  0826     Speech Total Time (min):  20 min    Billable Minutes: Eval 10  and Eval Swallow and Oral Function 10    FREYA Franco, CCC-SLP  [phone]  12/10/2017

## 2017-12-10 NOTE — HPI
The patient is a 57 yr old AAM with a history of CVA, HTN, CAD, CHF, dyslipidemia, cardiomyopathy, pacemaker, drug (cocaine, marijuana), tobacco and ETOH use. History and record of events obtained from chart as patient is still confused and disoriented post IR procedure.     The patient  developed chest pain this morning and went to OSH where he was admitted and being worked up for an MI.  This morning he fell getting up out of bed, at OSH. He developed left facial droop, left UE/LE weakness, became agitated and had non sensical speech. He was sent for CT of head which showed multiple areas of ischemic change. A telestroke consult was obtained and the neurologist recommended transfer to [facility] for further stroke evaluation.  His last known normal was 41/2 hours prior to admit at [facility]. Urine tox screen was positive for marijuana and cocaine.  On CTA multiphase a right M1 occlusion was found. He was taken to IR for successful thrombectomy RMCA M1 occlusion, with TICI 3 post procedure. He is being admitted to NICU s/p thrombectomy.

## 2017-12-10 NOTE — PROGRESS NOTES
ICU Progress Note  Neurocritical Care    Admit Date: 12/9/2017  LOS: 1    CC: Embolic stroke involving right middle cerebral artery    Code Status: Full Code     SUBJECTIVE:     Interval History/Significant Events: Medications:  Embolic stroke right mca  cytotoxic cerebral edema  Agitation requiring sedation protocol; precedex  Chest pain  Prolonged Qtc ; poa  Cocaine intoxication ; poa  thc abuse; poa  hypokalemia  EF : 10 %  Cocaine withdrawal      Continuous Infusions:   sodium chloride 0.9% 75 mL/hr at 12/10/17 0905    dexmedetomidine (PRECEDEX) infusion 0.6 mcg/kg/hr (12/10/17 0936)     Scheduled Meds:   aspirin  325 mg Oral Daily    atorvastatin  40 mg Oral Daily    magnesium sulfate  2 g Intravenous Once    potassium chloride  80 mEq Intravenous Once    senna-docusate 8.6-50 mg  1 tablet Oral BID    sodium chloride 0.9%  3 mL Intravenous Q8H     PRN Meds:.labetalol, ondansetron, sodium chloride 0.9%    OBJECTIVE:   Vital Signs (Most Recent):   Temp: 98.4 °F (36.9 °C) (12/10/17 0705)  Pulse: 78 (12/10/17 0905)  Resp: (!) 46 (12/10/17 0905)  BP: 121/84 (12/10/17 0905)  SpO2: 97 % (12/10/17 0905)    Vital Signs (24h Range):   Temp:  [97.7 °F (36.5 °C)-98.6 °F (37 °C)] 98.4 °F (36.9 °C)  Pulse:  [57-81] 78  Resp:  [11-46] 46  SpO2:  [93 %-100 %] 97 %  BP: (103-143)/() 121/84    ICP/CPP (Last 24h):        I & O (Last 24h):   Intake/Output Summary (Last 24 hours) at 12/10/17 1015  Last data filed at 12/10/17 0919   Gross per 24 hour   Intake           1312.5 ml   Output              250 ml   Net           1062.5 ml     Physical Exam:  GA: Alert, comfortable, no acute distress.   HEENT: No scleral icterus or JVD.   Pulmonary: Clear to auscultation A/P/L. No wheezing, crackles, or rhonchi.  Cardiac: RRR S1 & S2 w/o rubs/murmurs/gallops.   Abdominal: Bowel sounds present x 4. No appreciable hepatosplenomegaly.  Skin: No jaundice, rashes, or visible lesions.  Neuro:  Awake  ox0  Non-focal  bs reflexes  present         Lines/Drains/Airway:         Male External Urinary Catheter 12/09/17 1700 (Active)   Collection Container Urimeter 12/10/2017  7:05 AM   Securement Method secured to top of thigh w/ adhesive device 12/10/2017  7:05 AM   Skin no redness;no breakdown;skin barrier applied 12/10/2017  7:05 AM   Tolerance no signs/symptoms of discomfort 12/10/2017  7:05 AM   Output (mL) 250 mL 12/10/2017  6:15 AM   Catheter Change Date 12/09/17 12/10/2017  3:15 AM   Catheter Change Time 1700 12/10/2017  3:15 AM     Nutrition/Tube Feeds (if NPO state why):npo    Labs:  ABG: No results for input(s): PH, PO2, PCO2, HCO3, POCSATURATED, BE in the last 24 hours.  BMP:  Recent Labs  Lab 12/10/17  0625      K 2.9*      CO2 22*   BUN 11   CREATININE 1.2   GLU 79   MG 1.8   PHOS 2.9     LFT: Lab Results   Component Value Date    AST 19 12/10/2017    ALT 14 12/10/2017    ALKPHOS 51 (L) 12/10/2017    BILITOT 1.0 12/10/2017    ALBUMIN 2.9 (L) 12/10/2017    PROT 6.0 12/10/2017     CBC:   Lab Results   Component Value Date    WBC 6.16 12/10/2017    HGB 10.5 (L) 12/10/2017    HCT 31.7 (L) 12/10/2017    MCV 85 12/10/2017     12/10/2017     Microbiology x 7d:   Microbiology Results (last 7 days)     ** No results found for the last 168 hours. **        Imaging:  CT head: 1. Findings suggestive of evolving recent infarct involving a portion of the right MCA territory. No definite evidence of hemorrhage. No midline shift or hydrocephalus.    2. Remote left parietal infarct. Possible small remote left cerebellar infarct.  I personally reviewed the above image.    ASSESSMENT/PLAN:     Active Hospital Problems    Diagnosis    *Embolic stroke involving right middle cerebral artery    Cytotoxic cerebral edema    Agitation    Cocaine dependence with intoxication    Mild tetrahydrocannabinol (THC) abuse    Chest pain due to myocardial ischemia    Agitation requiring sedation protocol    Cocaine abuse    Anxiety    Chest  pain            Plan:  precedex gtt  Follow exam  Echo  cardiology consult  IV fluids  Follow UO  Follow for withdrawal;      Critical secondary to Patient has a condition that poses threat to life and bodily function:   Agitation/chest pain/titration of precedex/withdrawal     Critical care was time spent personally by me on the following activities: development of treatment plan with patient or surrogate and bedside caregivers, discussions with consultants, evaluation of patient's response to treatment, examination of patient, ordering and performing treatments and interventions, ordering and review of laboratory studies, ordering and review of radiographic studies, pulse oximetry, re-evaluation of patient's condition. This critical care time did not overlap with that of any other provider or involve time for any procedures.

## 2017-12-10 NOTE — PT/OT/SLP PROGRESS
Physical Therapy      Patient Name:  Jana Stewart   MRN:  [id number]    PT consult received and acknowledged.  Initial eval was attempted at 1350, but patient was sedated and unable to participate.  Will f/u tomorrow if patient able to participate at that time.     Magdalena Mendieta, PT  12/10/2017  [phone] (pager)

## 2017-12-10 NOTE — HPI
"Mr. Stewart is a 58 yo M with history of prior L hemispheric stroke, cocaine and marijuana use who is a transfer from [facility] for R MCA syndrome. Patient was admitted to OSH yesterday to rule out MI. Tox screen was positive on admission for cocaine. This morning he fell out of bed and was found with LSW and R gaze preference. Last known normal was >4.5 hours prior to evaluation so he was not a tpa candidate. [place] [facility] [place] MP on arrival showing a R M2 occlusion.   No family is at bedside and patient is confused so unable to obtain further history on patient. Attempted to call "relative" Sri listed in chart but no answer.   "

## 2017-12-10 NOTE — ASSESSMENT & PLAN NOTE
Mr. Stewart is a 58yo M transferred for new R MCA syndrome. LKN >4.5 hours when evaluated so no alteplase administered. Upon [place] [facility] [place], [place] had CTA MP showing R MCA LVO so he was brought to IR for thrombectomy.      -Antithrombotics for secondary stroke prevention: Antiplatelets:  Aspirin: 81 mg oral now and daily  -Statins for secondary stroke prevention and hyperlipidemia, if present: Atorvastatin- 40 mg oral daily  -Aggressive risk factor modification: Diet, Exercise, drug abuse  -Rehab Efforts: PT/OT/PM&R/SLP  -Diagnostics:     -MRI head without contrast to assess brain parenchyma   -Trans-thoracic cardiac echo to assess cardiac function/status  VTE Prophylaxis: Heparin 5000 units SQ every 8 hours

## 2017-12-10 NOTE — PLAN OF CARE
Problem: SLP Goal  Goal: SLP Goal  Outcome: Ongoing (interventions implemented as appropriate)  Clinical evaluation of swallow and cognitive-linguistic evaluation complete. Recommend pureed diet with thin liquids, NO straws. PO meds crushed in pureed. Cognitive-linguistic assessment limited 2/2 dec'd willingness to participate, 2/2 dec'd willingness to participate in evaluation, however likely somewhat altered. SLP to continue to follow pt for dysphagia therapy only.     FREYA Franco, CCC-SLP  [phone]  12/10/2017

## 2017-12-10 NOTE — SUBJECTIVE & OBJECTIVE
Past Medical History:   Diagnosis Date    Anxiety     Arthritis     Asthma     CHF (congestive heart failure)     Coronary artery disease     Depression     Hyperlipidemia     Hypertension     ZAMZAM (obstructive sleep apnea)      Past Surgical History:   Procedure Laterality Date    CARDIAC PACEMAKER PLACEMENT      CARDIAC PACEMAKER PLACEMENT      ELBOW FRACTURE SURGERY Right     FRACTURE SURGERY      stent       Family History   Problem Relation Age of Onset    Diabetes Mellitus Mother     Hypertension Mother     Heart disease Mother     Cancer Father     Hypertension Father     Heart disease Father     Diabetes Mellitus Father      Social History   Substance Use Topics    Smoking status: Current Every Day Smoker     Packs/day: 0.03     Types: Cigarettes    Smokeless tobacco: Never Used    Alcohol use No     Review of patient's allergies indicates:   Allergen Reactions    Bactrim [sulfamethoxazole-trimethoprim] Rash    Ibuprofen Rash    Tramadol Rash       Medications: I have reviewed the current medication administration record.    Prescriptions Prior to Admission   Medication Sig Dispense Refill Last Dose    alprazolam (XANAX) 1 MG tablet Take 1 mg by mouth 2 (two) times daily as needed for Anxiety.  0 3/8/2017    amiodarone (PACERONE) 200 MG Tab Take 200 mg by mouth 2 (two) times daily.  0 3/8/2017    aspirin (ECOTRIN) 81 MG EC tablet Take 81 mg by mouth once daily.   3/8/2017    atorvastatin (LIPITOR) 40 MG tablet Take 40 mg by mouth once daily.   3/8/2017    carvedilol (COREG) 25 MG tablet Take 1 tablet (25 mg total) by mouth 2 (two) times daily with meals. 180 tablet 3 3/8/2017    citalopram (CELEXA) 40 MG tablet Take 40 mg by mouth once daily.   3/8/2017    clopidogrel (PLAVIX) 75 mg tablet Take 1 tablet (75 mg total) by mouth once daily. 30 tablet 11 3/8/2017    furosemide (LASIX) 40 MG tablet TK 1 T PO  QD  2 3/8/2017    hydrOXYzine pamoate (VISTARIL) 50 MG Cap Take 50  mg by mouth every evening.  0 3/8/2017    isosorbide mononitrate (IMDUR) 30 MG 24 hr tablet Take 1 tablet (30 mg total) by mouth once daily. 90 tablet 3 3/8/2017    lisinopril (PRINIVIL,ZESTRIL) 20 MG tablet Take 1 tablet (20 mg total) by mouth once daily. 90 tablet 3 3/8/2017    midodrine (PROAMATINE) 5 MG Tab Take 5 mg by mouth 3 (three) times daily with meals.    3/8/2017    mirtazapine (REMERON) 15 MG tablet Take 2 tablets (30 mg total) by mouth every evening. 90 tablet 3 3/7/2017    potassium chloride SA (K-DUR,KLOR-CON) 20 MEQ tablet Take 20 mEq by mouth 2 (two) times daily.   3/8/2017    predniSONE (DELTASONE) 5 MG tablet Take 5 mg by mouth once daily.   3/8/2017    spironolactone (ALDACTONE) 25 MG tablet Take 1 tablet (25 mg total) by mouth once daily. 90 tablet 3 3/8/2017    VENTOLIN HFA 90 mcg/actuation inhaler INHALE 1 PUFF Q 4 H PRF WHEEZING  3 Taking       Review of Systems   Constitutional: Negative for fever.   HENT: Negative for trouble swallowing.    Eyes: Negative for visual disturbance.   Respiratory: Negative for shortness of breath.    Cardiovascular: Negative for chest pain.   Gastrointestinal: Negative for nausea and vomiting.   Endocrine: Negative for polyuria.   Genitourinary: Negative for dysuria.   Musculoskeletal: Negative for neck pain.   Skin: Negative for rash.   Allergic/Immunologic: Negative for immunocompromised state.   Neurological: Positive for facial asymmetry, speech difficulty and weakness.   Psychiatric/Behavioral: Positive for confusion.     Objective:     Vital Signs (Most Recent):  Temp: 98.6 °F (37 °C) (12/09/17 1915)  Pulse: 73 (12/09/17 2015)  Resp: 14 (12/09/17 2015)  BP: (!) 143/93 (12/09/17 2015)  SpO2: 99 % (12/09/17 2015)    Vital Signs Range (Last 24H):  Temp:  [97.7 °F (36.5 °C)-98.6 °F (37 °C)]   Pulse:  [72-81]   Resp:  [12-42]   BP: (110-143)/()   SpO2:  [93 %-100 %]     Physical Exam   Constitutional: He appears well-developed and  well-nourished. No distress.   HENT:   Head: Normocephalic and atraumatic.   Eyes: Pupils are equal, round, and reactive to light.   R gaze preference   Neck: Normal range of motion. Neck supple.   Cardiovascular: Normal rate.    Pulmonary/Chest: Effort normal.   Abdominal: Soft.   Musculoskeletal:   Significant L hemiparesis   Neurological: He is alert.   Aphasic, confused, perseverating on food   Skin: He is not diaphoretic.   Psychiatric: He is inattentive.       Neurological Exam:   LOC: alert  Attention Span: poor  Language: Global aphasia  Articulation: Dysarthria  Orientation: Not oriented to place, Not oriented to time  Visual Fields: Hemianopsia left  EOM (CN III, IV, VI): Gaze preference  right  Pupils (CN II, III): PERRL  Facial Sensation (CN V): Normal  Facial Movement (CN VII): Lower facial weakness on the Left  Gag Reflex: present  Reflexes: not tested  Motor: left hemiparesis 2/5 LUE, 0/5 LLE, 5/5 right extremities  Cebellar: No evidence of appendicular or axial ataxia  Sensation: Zachery-hypoesthesia left  Tone: Flaccid  LUE  and LLE      Laboratory:  CMP:   Recent Labs  Lab 12/09/17  1809   CALCIUM 9.2   ALBUMIN 3.4*   PROT 7.0      K 3.8   CO2 25      BUN 12   CREATININE 1.4   ALKPHOS 57   ALT 18   AST 22   BILITOT 1.4*     CBC:   Recent Labs  Lab 12/09/17  1607   WBC 6.12   RBC 3.96*   HGB 11.0*   HCT 32.8*      MCV 83   MCH 27.8   MCHC 33.5     Lipid Panel: No results for input(s): CHOL, LDLCALC, HDL, TRIG in the last 168 hours.  Coagulation:   Recent Labs  Lab 12/09/17  1607   INR 1.2     Hgb A1C: No results for input(s): HGBA1C in the last 168 hours.  TSH:   Recent Labs  Lab 12/09/17  1607   TSH 0.411       Diagnostic Results:      Brain imaging:  CTH 12/9/17: Early R MCA ischemic changes    Vessel Imaging:  CTA  12/9/17:   Focal occlusion of the distal M2 segment of the right MCA, with early ischemic changes in the right middle cerebral artery distribution.  There are  findings suggestive of a multiphase CTA score of 4.    Greater than 50% stenosis of the right vertebral artery at the V4 segment, with narrow caliber of the remaining vertebral artery and basilar artery.  The left vertebral artery terminates with its supply to the PICA.  Much of supply to posterior circulation comes via posterior communicating arteries.    Additional findings as above.    Cardiac Evaluation:   Pending 2D ECHO

## 2017-12-10 NOTE — CONSULTS
Inpatient consult to Physical Medicine Rehab  Consult performed by: GLYNN COREAS  Consult ordered by: KIMBERLY ABAD  Reason for consult: assess rehab needs        Reviewed patient history and current admission.  Rehab team following.  Full consult to follow.    ALYCE Brito, FNP-C  Physical Medicine & Rehabilitation   12/10/2017  Spectralink: [id number]

## 2017-12-10 NOTE — PROGRESS NOTES
Pt had 7 beat run of vtach, unsypmtomatic at this time. NCC notified. Currently replacing electrolytes. Will continue to monitor closely.

## 2017-12-10 NOTE — PROGRESS NOTES
Per Keyanna, give 80 meq IV total of potassium, including the 30 meq patient already received intravenously.

## 2017-12-10 NOTE — ASSESSMENT & PLAN NOTE
Mr. Stewart is a 56yo M transferred for new R MCA syndrome. LKN >4.5 hours when evaluated so no alteplase administered. Upon arrival to [facility] [place], patient had CTA MP showing R MCA LVO so he was brought to IR for thrombectomy.      -Antithrombotics for secondary stroke prevention: Antiplatelets:  Aspirin: 81 mg oral now and daily  -Statins for secondary stroke prevention and hyperlipidemia, if present: Atorvastatin- 40 mg oral daily  -Aggressive risk factor modification: Diet, Exercise, drug abuse  -Rehab Efforts: Physical Therapy, Occupational Therapy, Speech and Language Pathology, Physical Medicine and Rehabilitation  -Diagnostics: Ordered/Pending HgbA1C to assess blood glucose levels   -Lipid Profile to assess cholesterol levels   -MRI head without contrast to assess brain parenchyma   -Trans-thoracic cardiac echo to assess cardiac function/status   -TSH to assess thyroid function  VTE Prophylaxis: Heparin 5000 units SQ every 8 hours

## 2017-12-10 NOTE — HOSPITAL COURSE
12/9 admit to NICU s/p thrombectomy RMCA M1, with TICI 3 post procedure. Will Keep SBP <140 for 24h. CT head pending this evening    12/10- chest pain, resolved spontaneosly. troponins trending down - cardiology consulted, suspect cocaine-induced vs noncardiac, cannot rule out atheroslerotic disease given cocaine abuse      12/11- no acute events overnight, off precedex , awaiting echo and cxr to determine need for diuresis. No focal deficits on exam, however sometimes needs vocal stimulant to open eyes- lethargy vs poor cooperation. If continues , may get follow up CT as he is likely within peak swelling period.    INTERVAL 12/12- off precedex, doing well with seroquel and stable for ttf. EKG ordered for tomorrow to eval QTc. Continuing aspirin , plavix . Repeat  Echo EF 10. Patient seen by cardiology who is following.

## 2017-12-10 NOTE — PT/OT/SLP EVAL
"Occupational Therapy   Evaluation/Treatment    Name: Jana Stewart  MRN: [id number]  Admitting Diagnosis:  Embolic stroke involving right middle cerebral artery      Recommendations:     Discharge Recommendations: outpatient OT  Discharge Equipment Recommendations:  none  Barriers to discharge:  None    History:     Occupational Profile:  Living Environment: Per chart review, patient resides in [place].  Per patient, patient resides down the [address] on [address] behind the [place] in [place].  Patient reports residing with cousin in one story home, no steps to enter.  PTA patient independent with ADLs including driving.  Currently owns no DME.  Patient is right handed.  Hobbies:watching TV.   Unemployed; patient reports being on permanent disability 2* EF 10-14%.  Roles/Responsibilities:  Father of 2 boys and 2 girls, cousin.    Past Medical History:   Diagnosis Date    Anxiety     Arthritis     Asthma     CHF (congestive heart failure)     Coronary artery disease     Depression     Hyperlipidemia     Hypertension     ZAMZAM (obstructive sleep apnea)        Past Surgical History:   Procedure Laterality Date    CARDIAC PACEMAKER PLACEMENT      CARDIAC PACEMAKER PLACEMENT      ELBOW FRACTURE SURGERY Right     FRACTURE SURGERY      stent         Subjective   Nurse reported:  "He fell out of the bed at the other hospital.  He is adament that he did not have a stroke."  Patient:  "I hate the dark; I like the lights on.  I did not have a stroke.  I am here because I have congestive heart failure."   Communicated with: nurse prior to session.  Pain/Comfort:  · Pain Rating 1: 0/10  · Pain Rating Post-Intervention 1: 0/10    Objective:     Patient found with: pulse ox (continuous), telemetry, peripheral IV, blood pressure cuff, Condom Catheter  Family not present.  Sitter present.    General Precautions: Standard, aspiration, fall, NPO   Orthopedic Precautions:N/A   Braces: N/A     Occupational " Performance:    Bed Mobility:    · Patient completed Rolling/Turning to Left with  supervision  · Patient completed Rolling/Turning to Right with supervision  · Patient completed Scooting/Bridging with supervision  · Patient completed Supine to Sit with supervision  · Patient completed Sit to Supine with supervision    Functional Mobility/Transfers:  · Patient completed Sit <> Stand Transfer with SBA with  no assistive device     Activities of Daily Living:  · Feeding:  NPO  · Grooming: stand by assistance while seated EOB  · UB Dressing: minimum assistance with assistance for fasteners  · LB Dressing: minimum assistance with assistance for fasteners    Cognitive/Visual Perceptual:  Cognitive/Psychosocial Skills:     -       Oriented to: Person, Place and Time   -       Follows Commands/attention:Follows one-step commands    Physical Exam:  Postural examination/scapula alignment:    -       Rounded shoulders  Skin integrity: Visible skin intact  Edema:  None noted  Sensation:    -       Intact  Upper Extremity Range of Motion:  AROM Bilateral UE  Upper Extremity Strength: 5/5 bilateral UE    Patient left supine with all lines intact and call button in reach    [facility] 6 Click:  [facility] Total Score: 16    Additional Treatment:   Patient education provided on role of OT and need for continued OT upon discharge.  Patient education provided on dressing technique, and transfers. Patient verbalizing understanding via teach back method. Patient instructed on need to call for assistance for toileting needs and when getting up.  Continued education, patient/ family training recommended.  Patient alert and oriented x 3; able to follow 4/4 one step commands.  Patient attentive and interactive throughout the session.  Patient able to identify 3/3 body parts.  Able to name 3/3 objects.  Able to sequence 7/7 days of the week and 12/12 months of the year.  Patient's functional status and disposition recommendation discussed with  "patient and nurse.  White board updated in patient's room.  OT asked if there were any other questions; patient/ family had no further questions.  Education:    Assessment:     Jana Stewart is a 57 y.o. male with a medical diagnosis of Embolic stroke involving right middle cerebral artery.  He presents with performance deficits affecting function are weakness, impaired sensation, impaired endurance, impaired self care skills, impaired functional mobilty, impaired balance, gait instability.  These performance deficits have resulted in activity limitations including but not limited to: transfers, ascending/ descending stairs, walking short and long distances, walking around obstacles, transitional movement patterns (kneeling, bending); eating, upper body dressing, lower body dressing, brushing teeth, toileting, and writing.   Patient's role as father, cousin and independent caretaker for self has been affected. Patient will benefit from skilled OT services to maximize level of independence with self-care skills and functional mobility.      Rehab Prognosis:  Good; patient would benefit from acute skilled OT services to address these deficits and reach maximum level of function.         Clinical Decision Makin.  OT Low:  "Pt evaluation falls under low complexity for evaluation coding due to performance deficits noted in 1-3 areas as stated above and 0 co-morbities affecting current functional status. Data obtained from problem focused assessments. No modifications or assistance was required for completion of evaluation. Only brief occupational profile and history review completed."     Plan:     Patient to be seen 4 x/week to address the above listed problems via self-care/home management, cognitive retraining, neuromuscular re-education, therapeutic exercises  · Plan of Care Expires: 18  · Plan of Care Reviewed with: patient    This Plan of care has been discussed with the patient who was involved " in its development and understands and is in agreement with the identified goals and treatment plan    GOALS:    Occupational Therapy Goals        Problem: Occupational Therapy Goal    Goal Priority Disciplines Outcome Interventions   Occupational Therapy Goal     OT, PT/OT     Description:  Goals set 12/10 to be addressed for 7 days with expiration date, 12/17:  Patient will increase functional independence with ADLs by performing:  Patient will demonstrate stand pivot transfers with modified independence.   Not met  Patient will demonstrate grooming while standing with modified independence.   Not met  Patient will demonstrate upper body dressing with modified independence.   Not met  Patient will demonstrate lower body dressing with modified independence.   Not met  Patient will demonstrate toileting with modified independence.   Not met  Patient will demonstrate bathing while seated EOB with modified independence.   Not met  Patient's family / caregiver will demonstrate independence and safety with assisting patient with self-care skills and functional mobility.     Not met  Patient and/or patient's family will verbalize understanding of stroke prevention guidelines, personal risk factors and stroke warning signs via teachback method.  Not met                           Time Tracking:     OT Date of Treatment: 12/10/17  OT Start Time: 0606  OT Stop Time: 0642  OT Total Time (min): 36 min    Billable Minutes:Evaluation 12  Self Care/Home Management 10  Therapeutic Activity 14    LEYDI Bautista  12/10/2017

## 2017-12-10 NOTE — PROGRESS NOTES
"  [facility]  Adult Nutrition  Consult Note    SUMMARY     Recommendations    Recommendation/Intervention:  1. Rec Cardiac diet, texture per SLP.   2. Encourage PO intake.   3. RD to follow.  Goals: >85% of EEN, EPN  Nutrition Goal Status: new    Reason for Assessment    Reason for Assessment: physician consult  Diagnosis:  (Embolic stroke involving right middle cerebral artery)  Relevent Medical History: HTN, HLD, CHF, CAD   Interdisciplinary Rounds: did not attend  General Comments: Pt admitted post embolic stroke involving right middle cerebral artery. Nurse states pt has good PO intake. No issues with N/V.     Evaluation of Received Nutrients/Fluid Intake    Energy Calories Required: meeting needs  Protein Required: meeting needs  Fluid Required: meeting needs  % Intake of Estimated Energy Needs: 50 - 75 %  % Meal Intake: 75%     Nutrition Risk Screen     Nutrition Risk Screen: SLP rec puree diet with thin liquids, no straws.    Nutrition/Diet History    Patient Reported Diet/Restrictions/Preferences: general  Food Preferences: No cultural or Hindu preferences.     Labs/Tests/Procedures/Meds    Pertinent Labs Reviewed: reviewed  Pertinent Labs Comments: K 2.9, Ca 8.2, Albumin 2.9  Pertinent Medications Reviewed: reviewed  Pertinent Medications Comments: statin, aspirin, senna    Physical Findings    Skin:  (Abrasion posterior parietal region)    Anthropometrics    Temp: 98.4 °F (36.9 °C)     Height: 5' 7.79" (172.2 cm)  Weight Method: Bed Scale  Weight: 70.3 kg (154 lb 15.7 oz)  Ideal Body Weight (IBW), Male: 152.8 lb     % Ideal Body Weight, Male (lb): 101.43 lb     BMI (Calculated): 23.8  BMI Grade: 18.5-24.9 - normal     Estimated/Assessed Needs    Weight Used For Calorie Calculations: 70.3 kg (154 lb 15.7 oz)   Height (cm): 172.7 cm  Energy Calorie Requirements (kcal): 1880kcals  Energy Need Method: Ellsworth-St Jeor (x1.25(PAL))     RMR (Ellsworth-St. Jeor Equation): 1504.25     Weight " Used For Protein Calculations: 70.3 kg (154 lb 15.7 oz)  Protein Requirements: 70-84gms    Fluid Need Method: RDA Method (or per MD)     RDA Method (mL): 1880     Assessment and Plan    Nutrition Problem  Swallowing difficulty    Related to (etiology):   Stroke    Signs and Symptoms (as evidenced by):   SLP findings    Interventions/Recommendations (treatment strategy):  See recs    Nutrition Diagnosis Status:   New    Monitor and Evaluation    Food and Nutrient Intake: food and beverage intake  Food and Nutrient Adminstration: diet order  Physical Activity and Function: nutrition-related ADLs and IADLs  Anthropometric Measurements: weight, weight change  Biochemical Data, Medical Tests and Procedures: electrolyte and renal panel, gastrointestinal profile, glucose/endocrine profile, inflammatory profile, lipid profile  Nutrition-Focused Physical Findings: overall appearance    Nutrition Risk    Level of Risk:  (1x/ week)    Nutrition Follow-Up    RD Follow-up?: Yes

## 2017-12-10 NOTE — SUBJECTIVE & OBJECTIVE
Neurologic Chief Complaint: Right M2 occlusion s/p IR thrombectomy    Subjective:     Interval History: Patient is seen for follow-up neurological assessment and treatment recommendations: Patient was started on precedex for sedation because he was trying to leave AMA.  IR performed thrombectomy.     HPI, Past Medical, Family, and Social History remains the same as documented in the initial encounter.     Review of Systems   Unable to perform 2/2 sedation    Scheduled Meds:   aspirin  325 mg Oral Daily    atorvastatin  40 mg Oral Daily    magnesium sulfate in water  2 g Intravenous Once    potassium chloride  10 mEq Intravenous Q1H    senna-docusate 8.6-50 mg  1 tablet Oral BID    sodium chloride 0.9%  3 mL Intravenous Q8H     Continuous Infusions:   sodium chloride 0.9% 75 mL/hr at 12/10/17 1105    dexmedetomidine (PRECEDEX) infusion 0.4 mcg/kg/hr (12/10/17 1148)     PRN Meds:labetalol, ondansetron, sodium chloride 0.9%    Objective:     Vital Signs (Most Recent):  Temp: 98 °F (36.7 °C) (12/10/17 1105)  Pulse: 62 (12/10/17 1105)  Resp: (!) 54 (12/10/17 1105)  BP: 99/62 (12/10/17 1105)  SpO2: 98 % (12/10/17 1105)       Vital Signs Range (Last 24H):  Temp:  [97.7 °F (36.5 °C)-98.6 °F (37 °C)]   Pulse:  [57-81]   Resp:  [11-54]   BP: ()/()   SpO2:  [93 %-100 %]        Physical Exam   Constitutional: He appears well-developed and well-nourished. No distress.   sedated   HENT:   Head: Normocephalic and atraumatic.   Eyes:   Not follow commands to move eyes   Neck: Normal range of motion. Neck supple.   Cardiovascular: Normal rate.    Pulmonary/Chest: Effort normal.   Abdominal: Soft.   Musculoskeletal: He exhibits no edema.   Neurological: He is alert.   Sedating and minimally arousable   Skin: He is not diaphoretic.       Neurological Exam:   Limited 2/2 sedation. Able to move all 4 extremities spontaneously but not following commands due to sedation.  Minimally arousable.        Laboratory:  CMP:    Recent Labs  Lab 12/10/17  0625   CALCIUM 8.2*   ALBUMIN 2.9*   PROT 6.0      K 2.9*   CO2 22*      BUN 11   CREATININE 1.2   ALKPHOS 51*   ALT 14   AST 19   BILITOT 1.0     CBC:   Recent Labs  Lab 12/10/17  0322   WBC 6.16   RBC 3.74*   HGB 10.5*   HCT 31.7*      MCV 85   MCH 28.1   MCHC 33.1       Diagnostic Results     Brain imaging:  CTH 12/9/17: Early R MCA ischemic changes     Vessel Imaging:  CTA  12/9/17:   Focal occlusion of the distal M2 segment of the right MCA, with early ischemic changes in the right middle cerebral artery distribution.  There are findings suggestive of a multiphase CTA score of 4.    Greater than 50% stenosis of the right vertebral artery at the V4 segment, with narrow caliber of the remaining vertebral artery and basilar artery.  The left vertebral artery terminates with its supply to the PICA.  Much of supply to posterior circulation comes via posterior communicating arteries.    Additional findings as above.     Cardiac Evaluation:   Pending 2D ECHO

## 2017-12-10 NOTE — PROGRESS NOTES
[facility]  Vascular Neurology  Comprehensive Stroke Center  Progress Note    Assessment/Plan:     * Embolic stroke involving right middle cerebral artery    Mr. Stewart is a 58yo M transferred for new R MCA syndrome. LKN >4.5 hours when evaluated so no alteplase administered. [place] [facility] patient had CTA MP showing R MCA LVO so he was brought to IR for thrombectomy.      -Antithrombotics for secondary stroke prevention: Antiplatelets:  Aspirin: 81 mg oral now and daily  -Statins for secondary stroke prevention and hyperlipidemia, if present: Atorvastatin- 40 mg oral daily  -Aggressive risk factor modification: Diet, Exercise, drug abuse  -Rehab Efforts: PT/OT/PM&R/SLP  -Diagnostics:     -MRI head without contrast to assess brain parenchyma   -Trans-thoracic cardiac echo to assess cardiac function/status  VTE Prophylaxis: Heparin 5000 units SQ every 8 hours         Cocaine abuse    + cocaine on tox screen at [facility]SH             No notes on file    STROKE DOCUMENTATION   Acute Stroke Times   Last Known Normal Date: 12/08/17  Last Known Normal Time:  (unknown LKN - not listed in chart & no family present to confirm)  Symptom Onset Date: 12/09/17  Symptom Onset Time: 0700 (patient found symptomatic at 7am)  Stroke Team Called Date: 12/09/17  Stroke Team Called Time: 1527 (1:30 pm note from telestroke, pt transferred [place] [facility])  Stroke Team Arrival Date: 12/09/17  Stroke Team Arrival Time: 1528  CT Interpretation Time: 1550  Decision to Treat Time for Alteplase:  (n/a outside window)  Decision to Treat Time for IR: 1550    NIH Scale:  1a. Level Of Consciousness: 2-->Not alert: requires repeated stimulation to attend, or is obtunded and requires strong or painful stimulation to make movements (not stereotyped)       Modified Woodford Score: 1  Luther Coma Scale:    ABCD2 Score:    [id number] Score:   HAS -BLED Score:   ICH Score:   Hunt & Rosa Classification:      Hemorrhagic change of an Ischemic  Stroke: Does this patient have an ischemic stroke with hemorrhagic changes? No     Neurologic Chief Complaint: Right M2 occlusion s/p IR thrombectomy    Subjective:     Interval History: Patient is seen for follow-up neurological assessment and treatment recommendations: Patient was started on precedex for sedation because he was trying to leave A.  IR performed thrombectomy.     HPI, Past Medical, Family, and Social History remains the same as documented in the initial encounter.     Review of Systems   Unable to perform 2/2 sedation    Scheduled Meds:   aspirin  325 mg Oral Daily    atorvastatin  40 mg Oral Daily    magnesium sulfate in water  2 g Intravenous Once    potassium chloride  10 mEq Intravenous Q1H    senna-docusate 8.6-50 mg  1 tablet Oral BID    sodium chloride 0.9%  3 mL Intravenous Q8H     Continuous Infusions:   sodium chloride 0.9% 75 mL/hr at 12/10/17 1105    dexmedetomidine (PRECEDEX) infusion 0.4 mcg/kg/hr (12/10/17 1148)     PRN Meds:labetalol, ondansetron, sodium chloride 0.9%    Objective:     Vital Signs (Most Recent):  Temp: 98 °F (36.7 °C) (12/10/17 1105)  Pulse: 62 (12/10/17 1105)  Resp: (!) 54 (12/10/17 1105)  BP: 99/62 (12/10/17 1105)  SpO2: 98 % (12/10/17 1105)       Vital Signs Range (Last 24H):  Temp:  [97.7 °F (36.5 °C)-98.6 °F (37 °C)]   Pulse:  [57-81]   Resp:  [11-54]   BP: ()/()   SpO2:  [93 %-100 %]        Physical Exam   Constitutional: He appears well-developed and well-nourished. No distress.   sedated   HENT:   Head: Normocephalic and atraumatic.   Eyes:   Not follow commands to move eyes   Neck: Normal range of motion. Neck supple.   Cardiovascular: Normal rate.    Pulmonary/Chest: Effort normal.   Abdominal: Soft.   Musculoskeletal: He exhibits no edema.   Neurological: He is alert.   Sedating and minimally arousable   Skin: He is not diaphoretic.       Neurological Exam:   Limited 2/2 sedation. Able to move all 4 extremities spontaneously but not  following commands due to sedation.  Minimally arousable.        Laboratory:  CMP:   Recent Labs  Lab 12/10/17  0625   CALCIUM 8.2*   ALBUMIN 2.9*   PROT 6.0      K 2.9*   CO2 22*      BUN 11   CREATININE 1.2   ALKPHOS 51*   ALT 14   AST 19   BILITOT 1.0     CBC:   Recent Labs  Lab 12/10/17  0322   WBC 6.16   RBC 3.74*   HGB 10.5*   HCT 31.7*      MCV 85   MCH 28.1   MCHC 33.1       Diagnostic Results     Brain imaging:  CTH 12/9/17: Early R MCA ischemic changes     Vessel Imaging:  CTA MP 12/9/17:   Focal occlusion of the distal M2 segment of the right MCA, with early ischemic changes in the right middle cerebral artery distribution.  There are findings suggestive of a multiphase CTA score of 4.    Greater than 50% stenosis of the right vertebral artery at the V4 segment, with narrow caliber of the remaining vertebral artery and basilar artery.  The left vertebral artery terminates with its supply to the PICA.  Much of supply to posterior circulation comes via posterior communicating arteries.    Additional findings as above.     Cardiac Evaluation:   Pending 2D ECHO         Sara Sorto MD  Comprehensive Stroke Center  Department of Vascular Neurology   [facility]

## 2017-12-11 PROBLEM — I50.22 CHRONIC SYSTOLIC CHF (CONGESTIVE HEART FAILURE): Status: ACTIVE | Noted: 2017-12-11

## 2017-12-11 PROBLEM — E46 MALNUTRITION: Status: ACTIVE | Noted: 2017-12-11

## 2017-12-11 PROBLEM — F10.20 ETOHISM: Status: ACTIVE | Noted: 2017-12-11

## 2017-12-11 PROBLEM — E78.2 MIXED HYPERLIPIDEMIA: Status: ACTIVE | Noted: 2017-12-11

## 2017-12-11 LAB
ALBUMIN SERPL BCP-MCNC: 2.5 G/DL
ALBUMIN SERPL BCP-MCNC: 2.8 G/DL
ALP SERPL-CCNC: 43 U/L
ALP SERPL-CCNC: 52 U/L
ALT SERPL W/O P-5'-P-CCNC: 11 U/L
ALT SERPL W/O P-5'-P-CCNC: 12 U/L
ANION GAP SERPL CALC-SCNC: 6 MMOL/L
ANION GAP SERPL CALC-SCNC: 6 MMOL/L
ANISOCYTOSIS BLD QL SMEAR: SLIGHT
AST SERPL-CCNC: 16 U/L
AST SERPL-CCNC: 20 U/L
BACTERIA UR CULT: NORMAL
BASOPHILS # BLD AUTO: 0.03 K/UL
BASOPHILS NFR BLD: 0.5 %
BILIRUB SERPL-MCNC: 0.8 MG/DL
BILIRUB SERPL-MCNC: 0.8 MG/DL
BUN SERPL-MCNC: 10 MG/DL
BUN SERPL-MCNC: 9 MG/DL
CALCIUM SERPL-MCNC: 7.1 MG/DL
CALCIUM SERPL-MCNC: 7.7 MG/DL
CHLORIDE SERPL-SCNC: 113 MMOL/L
CHLORIDE SERPL-SCNC: 115 MMOL/L
CO2 SERPL-SCNC: 19 MMOL/L
CO2 SERPL-SCNC: 20 MMOL/L
CREAT SERPL-MCNC: 1.1 MG/DL
CREAT SERPL-MCNC: 1.2 MG/DL
DIFFERENTIAL METHOD: ABNORMAL
EOSINOPHIL # BLD AUTO: 0.1 K/UL
EOSINOPHIL NFR BLD: 1 %
ERYTHROCYTE [DISTWIDTH] IN BLOOD BY AUTOMATED COUNT: 23.6 %
EST. GFR  (AFRICAN AMERICAN): >60 ML/MIN/1.73 M^2
EST. GFR  (AFRICAN AMERICAN): >60 ML/MIN/1.73 M^2
EST. GFR  (NON AFRICAN AMERICAN): >60 ML/MIN/1.73 M^2
EST. GFR  (NON AFRICAN AMERICAN): >60 ML/MIN/1.73 M^2
ESTIMATED PA SYSTOLIC PRESSURE: 46.69
GLUCOSE SERPL-MCNC: 81 MG/DL
GLUCOSE SERPL-MCNC: 85 MG/DL
HCT VFR BLD AUTO: 30.7 %
HGB BLD-MCNC: 10.3 G/DL
HYPOCHROMIA BLD QL SMEAR: ABNORMAL
IMM GRANULOCYTES # BLD AUTO: 0.02 K/UL
IMM GRANULOCYTES NFR BLD AUTO: 0.3 %
LYMPHOCYTES # BLD AUTO: 1.4 K/UL
LYMPHOCYTES NFR BLD: 23.3 %
MAGNESIUM SERPL-MCNC: 1.8 MG/DL
MAGNESIUM SERPL-MCNC: 1.9 MG/DL
MCH RBC QN AUTO: 28 PG
MCHC RBC AUTO-ENTMCNC: 33.6 G/DL
MCV RBC AUTO: 83 FL
MITRAL VALVE REGURGITATION: ABNORMAL
MONOCYTES # BLD AUTO: 0.5 K/UL
MONOCYTES NFR BLD: 8.8 %
NEUTROPHILS # BLD AUTO: 3.8 K/UL
NEUTROPHILS NFR BLD: 66.1 %
NRBC BLD-RTO: 0 /100 WBC
PHOSPHATE SERPL-MCNC: 1.8 MG/DL
PHOSPHATE SERPL-MCNC: 1.9 MG/DL
PLATELET # BLD AUTO: 247 K/UL
PLATELET BLD QL SMEAR: ABNORMAL
PMV BLD AUTO: 10.2 FL
POCT GLUCOSE: 92 MG/DL (ref 70–110)
POLYCHROMASIA BLD QL SMEAR: ABNORMAL
POTASSIUM SERPL-SCNC: 4 MMOL/L
POTASSIUM SERPL-SCNC: 4.4 MMOL/L
PROT SERPL-MCNC: 5.1 G/DL
PROT SERPL-MCNC: 5.9 G/DL
RBC # BLD AUTO: 3.68 M/UL
RETIRED EF AND QEF - SEE NOTES: 10 (ref 55–65)
SODIUM SERPL-SCNC: 136 MMOL/L
SODIUM SERPL-SCNC: 139 MMOL/L
SODIUM SERPL-SCNC: 140 MMOL/L
TRICUSPID VALVE REGURGITATION: ABNORMAL
WBC # BLD AUTO: 5.79 K/UL

## 2017-12-11 PROCEDURE — 84100 ASSAY OF PHOSPHORUS: CPT | Mod: 91

## 2017-12-11 PROCEDURE — 99232 SBSQ HOSP IP/OBS MODERATE 35: CPT | Mod: ,,, | Performed by: NURSE PRACTITIONER

## 2017-12-11 PROCEDURE — 93306 TTE W/DOPPLER COMPLETE: CPT

## 2017-12-11 PROCEDURE — 85025 COMPLETE CBC W/AUTO DIFF WBC: CPT

## 2017-12-11 PROCEDURE — 82607 VITAMIN B-12: CPT

## 2017-12-11 PROCEDURE — 80053 COMPREHEN METABOLIC PANEL: CPT | Mod: 91

## 2017-12-11 PROCEDURE — 20000000 HC ICU ROOM

## 2017-12-11 PROCEDURE — 83735 ASSAY OF MAGNESIUM: CPT

## 2017-12-11 PROCEDURE — 97163 PT EVAL HIGH COMPLEX 45 MIN: CPT

## 2017-12-11 PROCEDURE — 83735 ASSAY OF MAGNESIUM: CPT | Mod: 91

## 2017-12-11 PROCEDURE — 99233 SBSQ HOSP IP/OBS HIGH 50: CPT | Mod: ,,, | Performed by: PSYCHIATRY & NEUROLOGY

## 2017-12-11 PROCEDURE — 84295 ASSAY OF SERUM SODIUM: CPT

## 2017-12-11 PROCEDURE — 25000003 PHARM REV CODE 250: Performed by: PHYSICIAN ASSISTANT

## 2017-12-11 PROCEDURE — 84100 ASSAY OF PHOSPHORUS: CPT

## 2017-12-11 PROCEDURE — 63600175 PHARM REV CODE 636 W HCPCS: Performed by: PHYSICIAN ASSISTANT

## 2017-12-11 PROCEDURE — 93306 TTE W/DOPPLER COMPLETE: CPT | Mod: 26,,, | Performed by: INTERNAL MEDICINE

## 2017-12-11 PROCEDURE — 99233 SBSQ HOSP IP/OBS HIGH 50: CPT | Mod: ,,, | Performed by: PHYSICIAN ASSISTANT

## 2017-12-11 PROCEDURE — 80053 COMPREHEN METABOLIC PANEL: CPT

## 2017-12-11 RX ORDER — HEPARIN SODIUM 5000 [USP'U]/ML
5000 INJECTION, SOLUTION INTRAVENOUS; SUBCUTANEOUS EVERY 8 HOURS
Status: DISCONTINUED | OUTPATIENT
Start: 2017-12-11 | End: 2017-12-14 | Stop reason: HOSPADM

## 2017-12-11 RX ORDER — ATORVASTATIN CALCIUM 20 MG/1
80 TABLET, FILM COATED ORAL DAILY
Status: DISCONTINUED | OUTPATIENT
Start: 2017-12-12 | End: 2017-12-14 | Stop reason: HOSPADM

## 2017-12-11 RX ORDER — LORAZEPAM 0.5 MG/1
0.5 TABLET ORAL EVERY 6 HOURS PRN
Status: DISCONTINUED | OUTPATIENT
Start: 2017-12-11 | End: 2017-12-12

## 2017-12-11 RX ORDER — DEXMEDETOMIDINE HYDROCHLORIDE 4 UG/ML
0.2 INJECTION, SOLUTION INTRAVENOUS CONTINUOUS
Status: DISCONTINUED | OUTPATIENT
Start: 2017-12-11 | End: 2017-12-12

## 2017-12-11 RX ORDER — CLOPIDOGREL BISULFATE 75 MG/1
75 TABLET ORAL DAILY
Status: DISCONTINUED | OUTPATIENT
Start: 2017-12-11 | End: 2017-12-14 | Stop reason: HOSPADM

## 2017-12-11 RX ORDER — FOLIC ACID 1 MG/1
1 TABLET ORAL DAILY
Status: DISCONTINUED | OUTPATIENT
Start: 2017-12-12 | End: 2017-12-14 | Stop reason: HOSPADM

## 2017-12-11 RX ORDER — THIAMINE HCL 100 MG
100 TABLET ORAL DAILY
Status: DISCONTINUED | OUTPATIENT
Start: 2017-12-12 | End: 2017-12-14 | Stop reason: HOSPADM

## 2017-12-11 RX ADMIN — HEPARIN SODIUM 5000 UNITS: 5000 INJECTION, SOLUTION INTRAVENOUS; SUBCUTANEOUS at 11:12

## 2017-12-11 RX ADMIN — FOLIC ACID: 5 INJECTION, SOLUTION INTRAMUSCULAR; INTRAVENOUS; SUBCUTANEOUS at 12:12

## 2017-12-11 NOTE — HPI
Jana Stewart is a 57-year-old male with PMHx of HTN, HLD, CHF, CAD, ZAMZAM, CVA, arthritis, and substance abuse.  Patient presented to [facility] after being found down with left sided weakness and right gaze preference.  On arrival, tox screen positive for cocaine.  Tele-stroke consult completed.  CTH with early R MCA changes; however, not tPA candidate 2/2 outside of treatment window.  Transferred [place] [facility] [place] 12/9/17 for further evaluation and management.  Upon admission, CTA revealed R M2 occlusion, and he underwent cerebral angiogram with aspiration thrombectomy.  Hospital course complicated by agitation requiring precedex infusion.      Functional History: Patient lives in [place] with his cousin in a single story home without steps to enter.  Prior to admission, he was independent with ADLs, including driving, and mobility.  He is right handed.  DME: none.

## 2017-12-11 NOTE — ASSESSMENT & PLAN NOTE
-Total cholesterol 356, , HDL 7, LDL invalid 2/2 TGs  -Continue atorvastatin 40 mg po qd  -Consider additional treatment for hypertriglyceridemia--gemfibrozil

## 2017-12-11 NOTE — HOSPITAL COURSE
12/10/17:  Evaluated by SLP and OT.  PT evaluation pending.  SLP bedside swallow evaluation completed.  SLP recommendation: puree diet and thin liquids.  Unwilling to participate in cognitive-linguistic evaluation.  Bed mobility SV.  Sit to stand SBA.  UBD Campbell and LBD Campbell.

## 2017-12-11 NOTE — PROGRESS NOTES
[facility]  Vascular Neurology  Comprehensive Stroke Center  Progress Note    Assessment/Plan:     * Embolic stroke involving right middle cerebral artery    58 yo M with PMHx CHF and cocaine abuse transferred for new R MCA syndrome. LKN >4.5 hours when evaluated so no alteplase administered. Upon arrival to [facility] [place], patient had CTA MP showing R MCA LVO so he was brought to IR for thrombectomy.      -Antithrombotics for secondary stroke prevention: Antiplatelets:  ASA 81 mg po qd   -Statins for secondary stroke prevention/HLD, if present: Atorvastatin 40 mg po qd (LDL invalid as TGs > 479)  -Aggressive risk factor modification: HLD, Diet, Exercise, drug abuse  -Rehab Efforts: PT/OT/PM&R/SLP  -Diagnostics:     -MRI head without contrast to assess brain parenchyma  VTE Prophylaxis: Heparin 5000 U q8h      Cytotoxic cerebral edema    -Seen on imaging, 2/2 R MCA stroke      Mixed hyperlipidemia    -Total cholesterol 356, , HDL 7, LDL invalid 2/2 TGs  -Continue atorvastatin 40 mg po qd  -Consider additional treatment for hypertriglyceridemia--gemfibrozil       Chronic systolic CHF (congestive heart failure)    -2D Echo with EF 10-15%      Cocaine abuse    -+ cocaine on tox screen at OSH      12/09/17:  Admission to Neuro ICU.  IR for thrombectomy due to R MCA occlusion.  12/10/17:  Patient with slight improvement after procedure.  Some sedation during exam, some of weakness documented likely related to sedated state.  12/11/17:  Patient awake with confusion, but coherent speech.  Moving LUE, LLE more today.  2D Echo showing severely depressed EF (reported by tech at 8%).    STROKE DOCUMENTATION   Acute Stroke Times   Last Known Normal Date: 12/08/17  Last Known Normal Time:  (unknown LKN - not listed in chart & no family present to confirm)  Symptom Onset Date: 12/09/17  Symptom Onset Time: 0700 (patient found symptomatic at 7am)  Stroke Team Called Date: 12/09/17  Stroke Team Called Time: 3257  (1:30 pm note from telestroke, pt transferred to [facility] [place])  Stroke Team Arrival Date: 12/09/17  Stroke Team Arrival Time: 1528  CT Interpretation Time: 1550  Decision to Treat Time for Alteplase:  (n/a outside window)  Decision to Treat Time for IR: 1550    NIH Scale:  1a. Level Of Consciousness: 0-->Alert: keenly responsive  1b. LOC Questions: 1-->Answers one question correctly  1c. LOC Commands: 0-->Performs both tasks correctly  2. Best Gaze: 0-->Normal  3. Visual: 0-->No visual loss  4. Facial Palsy: 0-->Normal symmetrical movements  5a. Motor Arm, Left: 1-->Drift: limb holds 90 (or 45) degrees, but drifts down before full 10 seconds: does not hit bed or other support  5b. Motor Arm, Right: 0-->No drift: limb holds 90 (or 45) degrees for full 10 secs  6a. Motor Leg, Left: 2-->Some effort against gravity: leg falls to bed by 5 secs, but has some effort against gravity  6b. Motor Leg, Right: 0-->No drift: leg holds 30 degree position for full 5 secs  7. Limb Ataxia: 0-->Absent  8. Sensory: 1-->Mild-to-moderate sensory loss: patient feels pinprick is less sharp or is dull on the affected side: or there is a loss of superficial pain with pinprick, but patient is aware of being touched  9. Best Language: 1-->Mild-to-moderate aphasia: some obvious loss of fluency or facility of comprehension, without significant limitation on ideas expressed or form of expression. Reduction of speech and/or comprehension, however, makes conversation. . . (see row details)  10. Dysarthria: 0-->Normal  11. Extinction and Inattention (formerly Neglect): 1-->Visual, tactile, auditory, spatial, or personal inattention or extinction to bilateral simultaneous stimulation in one of the sensory modalities  Total (NIH Stroke Scale): 7       Modified Helmetta Score: 1  Vinicius Coma Scale:14   ABCD2 Score:    [id number] Score:   HAS -BLED Score:   ICH Score:   Hunt & Rosa Classification:      Hemorrhagic change of an Ischemic Stroke: Does this patient  have an ischemic stroke with hemorrhagic changes? No     Neurologic Chief Complaint: R MCA stroke s/p thrombectomy    Subjective:     Interval History: Patient is seen for follow-up neurological assessment and treatment recommendations: Patient is confused today, stating he did not have a stroke and frustrated that everyone keeps saying he did. Also noted to be mumbling to himself when I walked in. Wants to go home and keeps stating he lives down the street. Has improvement in LUE/LLE strength and is moving all extremities spontaneously.       HPI, Past Medical, Family, and Social History remains the same as documented in the initial encounter.     Review of Systems   Constitutional: Negative for chills and fever.   Eyes: Negative for photophobia and visual disturbance.   Respiratory: Negative for cough and shortness of breath.    Cardiovascular: Negative for chest pain and palpitations.   Gastrointestinal: Negative for constipation, diarrhea, nausea and vomiting.   Musculoskeletal: Negative for arthralgias and myalgias.   Skin: Negative for rash and wound.   Neurological: Positive for speech difficulty and weakness.   Hematological: Negative for adenopathy. Does not bruise/bleed easily.   Psychiatric/Behavioral: Positive for agitation and confusion.     Scheduled Meds:   aspirin  325 mg Oral Daily    atorvastatin  40 mg Oral Daily    clopidogrel  75 mg Oral Daily    [START ON 12/12/2017] folic acid  1 mg Oral Daily    heparin (porcine)  5,000 Units Subcutaneous Q8H    senna-docusate 8.6-50 mg  1 tablet Oral BID    sodium chloride 0.9%  3 mL Intravenous Q8H    [START ON 12/12/2017] thiamine  100 mg Oral Daily     Continuous Infusions:   dexmedetomidine (PRECEDEX) infusion 0.4 mcg/kg/hr (12/11/17 1205)     PRN Meds:LORazepam, ondansetron, sodium chloride 0.9%    Objective:     Vital Signs (Most Recent):  Temp: 98.6 °F (37 °C) (12/11/17 1105)  Pulse: 60 (12/11/17 1205)  Resp: (!) 25 (12/11/17 1205)  BP:  105/60 (12/11/17 1205)  SpO2: 98 % (12/11/17 1205)     Vital Signs Range (Last 24H):  Temp:  [98 °F (36.7 °C)-98.6 °F (37 °C)]   Pulse:  [55-76]   Resp:  [10-37]   BP: ()/(59-83)   SpO2:  [96 %-100 %]        Physical Exam  General:  Well-developed, well-nourished, nad  HEENT:  NCAT, PERRLA, EOMI, oropharyngeal membranes non-erythematous/without exudate  Neck:  Supple, normal ROM without nuchal rigidity.  Resp:  Symmetric expansion, no increased wob  CVS:  No LE edema, peripheral pulses 2+ (radial, dorsalis pedis)  GI:  Abd soft, non-distended, non-tender to palpation  Neurological Exam:   Mental Status:  Awake, alert, oriented to self, location, and month/year.  Seems to lack insight into condition.  Stating he wants to go home, some agitation.  Following commands well.  Cranial Nerves:  VFs intact to movement in all quadrants bilaterally.  PERRLA, EOMI.  Facial movement and sensation intact.  Palate raises symmetrically, tongue protrudes midline.  Trapezius 5/5 bilaterally.    Motor:  Normal bulk and tone.  Strength 5/5 RUE/RLE.  L  strength 5/5, L biceps/triceps 4/5.  LLE with some drift, questionable effort on exam.  LLE plantarflexion and dorsiflexion 5/5.  Sensory:  Intact to light touch with some inattention L compared to R.  Vibratory sensation intact BUE/BLE digits.  Reflexes:  Biceps, brachioradialis, patellar, Achilles 2+.  No ankle clonus.  Equivocal toe bilaterally.  Coordination:  FNF, ERIC, HTS intact.  Gait:  Deferred 2/2 fall risk, tech at bedside for 2D Echo    Laboratory:  CMP:   Recent Labs  Lab 12/11/17 0359 12/11/17  1117   CALCIUM 7.7*  --    ALBUMIN 2.8*  --    PROT 5.9*  --     136   K 4.4  --    CO2 20*  --    *  --    BUN 10  --    CREATININE 1.2  --    ALKPHOS 52*  --    ALT 12  --    AST 20  --    BILITOT 0.8  --      CBC:   Recent Labs  Lab 12/11/17  0359   WBC 5.79   RBC 3.68*   HGB 10.3*   HCT 30.7*      MCV 83   MCH 28.0   MCHC 33.6     Lipid Panel:    Recent Labs  Lab 12/09/17  2329   CHOL 356*   LDLCALC Invalid, Trig>400.0   HDL 7*   TRIG 479*     Hgb A1C:   Recent Labs  Lab 12/09/17  2329   HGBA1C 5.3     TSH:   Recent Labs  Lab 12/09/17  1607   TSH 0.411     Diagnostic Results     Brain Imaging   12/09/17 CT head w/o contrast:  1. Findings suggestive of evolving recent infarct involving a portion of the right MCA territory. No definite evidence of hemorrhage. No midline shift or hydrocephalus.  2. Remote left parietal infarct. Possible small remote left cerebellar infarct.    Vessel Imaging   12/09/17 CTA Stroke Multiphase:  Focal occlusion of the distal M2 segment of the right MCA, with early ischemic changes in the right middle cerebral artery distribution.  There are findings suggestive of a multiphase CTA score of 4.  Greater than 50% stenosis of the right vertebral artery at the V4 segment, with narrow caliber of the remaining vertebral artery and basilar artery.  The left vertebral artery terminates with its supply to the PICA.  Much of supply to posterior circulation comes via posterior communicating arteries.    12/09/17 IR angiogram carotid internal inc arch and cerebral unilateral  1. Successful aspiration thrombectomy of the right middle cerebral artery.  2. TICI 3 of the right middle cerebral artery artery.    Cardiac Imaging   12/11/17 2D Echo w/ CFD:  CONCLUSIONS     1 - Eccentric LVH with severely depressed left ventricular systolic function (EF 10-15%).     2 - Severe left atrial enlargement.     3 - Mildly depressed right ventricular systolic function .     4 - Moderate to severe mitral regurgitation.     5 - Moderate tricuspid regurgitation.     6 - Pulmonary hypertension. The estimated PA systolic pressure is 47 mmHg.     7 - Intermediate central venous pressure.     Bettie Tsai MD  Comprehensive Stroke Center  Department of Vascular Neurology   [facility]

## 2017-12-11 NOTE — ASSESSMENT & PLAN NOTE
-  Presented being found down with left sided weakness and right gaze preference.  On arrival, tox screen positive for cocaine.    -  CTH with early R MCA changes--> not tPA candidate 2/2 outside of treatment window.   -  CTA revealed R M2 occlusion--> s/p cerebral angiogram with aspiration thrombectomy.     Functional status: see hospital course  Cognitive/Speech/Language status:  Pending--> Unwilling to participate in cognitive-linguistic evaluation.  Nutrition/Swallow Status:  SLP bedside swallow evaluation completed.  SLP recommendation: puree diet and thin liquids.    Recommendations  -  Encourage mobility, OOB in chair at least 3 hours per day, and early ambulation as appropriate   -  PT/OT evaluate and treat  -  SLP speech and cognitive evaluate and treat  -  Monitor sleep disturbances and establish consistent sleep-wake cycle  -  Monitor for bowel and bladder dysfunction  -  Monitor for shoulder pain and subluxation  -  Monitor for spasticity  -  Monitor for and prevent skin breakdown and pressure ulcers  · Early mobility, repositioning/weight shifting every 20-30 minutes when sitting, turn patient every 2 hours, proper mattress/overlay and chair cushioning, pressure relief/heel protector boots  -  DVT prophylaxis:  ROME, SCD  -  Reviewed discharge options (IP rehab, SNF, HH therapy, and OP therapy)

## 2017-12-11 NOTE — SUBJECTIVE & OBJECTIVE
Neurologic Chief Complaint: R MCA stroke s/p thrombectomy    Subjective:     Interval History: Patient is seen for follow-up neurological assessment and treatment recommendations: Patient is confused today, stating he did not have a stroke and frustrated that everyone keeps saying he did. Also noted to be mumbling to himself when I walked in. Wants to go home and keeps stating he lives down the street. Has improvement in LUE/LLE strength and is moving all extremities spontaneously.       HPI, Past Medical, Family, and Social History remains the same as documented in the initial encounter.     Review of Systems   Constitutional: Negative for chills and fever.   Eyes: Negative for photophobia and visual disturbance.   Respiratory: Negative for cough and shortness of breath.    Cardiovascular: Negative for chest pain and palpitations.   Gastrointestinal: Negative for constipation, diarrhea, nausea and vomiting.   Musculoskeletal: Negative for arthralgias and myalgias.   Skin: Negative for rash and wound.   Neurological: Positive for speech difficulty and weakness.   Hematological: Negative for adenopathy. Does not bruise/bleed easily.   Psychiatric/Behavioral: Positive for agitation and confusion.     Scheduled Meds:   aspirin  325 mg Oral Daily    atorvastatin  40 mg Oral Daily    clopidogrel  75 mg Oral Daily    [START ON 12/12/2017] folic acid  1 mg Oral Daily    heparin (porcine)  5,000 Units Subcutaneous Q8H    senna-docusate 8.6-50 mg  1 tablet Oral BID    sodium chloride 0.9%  3 mL Intravenous Q8H    [START ON 12/12/2017] thiamine  100 mg Oral Daily     Continuous Infusions:   dexmedetomidine (PRECEDEX) infusion 0.4 mcg/kg/hr (12/11/17 1205)     PRN Meds:LORazepam, ondansetron, sodium chloride 0.9%    Objective:     Vital Signs (Most Recent):  Temp: 98.6 °F (37 °C) (12/11/17 1105)  Pulse: 60 (12/11/17 1205)  Resp: (!) 25 (12/11/17 1205)  BP: 105/60 (12/11/17 1205)  SpO2: 98 % (12/11/17 1205)     Vital  Signs Range (Last 24H):  Temp:  [98 °F (36.7 °C)-98.6 °F (37 °C)]   Pulse:  [55-76]   Resp:  [10-37]   BP: ()/(59-83)   SpO2:  [96 %-100 %]        Physical Exam  General:  Well-developed, well-nourished, nad  HEENT:  NCAT, PERRLA, EOMI, oropharyngeal membranes non-erythematous/without exudate  Neck:  Supple, normal ROM without nuchal rigidity.  Resp:  Symmetric expansion, no increased wob  CVS:  No LE edema, peripheral pulses 2+ (radial, dorsalis pedis)  GI:  Abd soft, non-distended, non-tender to palpation  Neurological Exam:   Mental Status:  Awake, alert, oriented to self, location, and month/year.  Seems to lack insight into condition.  Stating he wants to go home, some agitation.  Following commands well.  Cranial Nerves:  VFs intact to movement in all quadrants bilaterally.  PERRLA, EOMI.  Facial movement and sensation intact.  Palate raises symmetrically, tongue protrudes midline.  Trapezius 5/5 bilaterally.    Motor:  Normal bulk and tone.  Strength 5/5 RUE/RLE.  L  strength 5/5, L biceps/triceps 4/5.  LLE with some drift, questionable effort on exam.  LLE plantarflexion and dorsiflexion 5/5.  Sensory:  Intact to light touch with some inattention L compared to R.  Vibratory sensation intact BUE/BLE digits.  Reflexes:  Biceps, brachioradialis, patellar, Achilles 2+.  No ankle clonus.  Equivocal toe bilaterally.  Coordination:  FNF, ERIC, HTS intact.  Gait:  Deferred 2/2 fall risk, tech at bedside for 2D Echo    Laboratory:  CMP:   Recent Labs  Lab 12/11/17  0359 12/11/17  1117   CALCIUM 7.7*  --    ALBUMIN 2.8*  --    PROT 5.9*  --     136   K 4.4  --    CO2 20*  --    *  --    BUN 10  --    CREATININE 1.2  --    ALKPHOS 52*  --    ALT 12  --    AST 20  --    BILITOT 0.8  --      CBC:   Recent Labs  Lab 12/11/17  0359   WBC 5.79   RBC 3.68*   HGB 10.3*   HCT 30.7*      MCV 83   MCH 28.0   MCHC 33.6     Lipid Panel:   Recent Labs  Lab 12/09/17  2329   CHOL 356*   LDLCALC Invalid,  Trig>400.0   HDL 7*   TRIG 479*     Hgb A1C:   Recent Labs  Lab 12/09/17  2329   HGBA1C 5.3     TSH:   Recent Labs  Lab 12/09/17  1607   TSH 0.411     Diagnostic Results     Brain Imaging   12/09/17 CT head w/o contrast:  1. Findings suggestive of evolving recent infarct involving a portion of the right MCA territory. No definite evidence of hemorrhage. No midline shift or hydrocephalus.  2. Remote left parietal infarct. Possible small remote left cerebellar infarct.    Vessel Imaging   12/09/17 CTA Stroke Multiphase:  Focal occlusion of the distal M2 segment of the right MCA, with early ischemic changes in the right middle cerebral artery distribution.  There are findings suggestive of a multiphase CTA score of 4.  Greater than 50% stenosis of the right vertebral artery at the V4 segment, with narrow caliber of the remaining vertebral artery and basilar artery.  The left vertebral artery terminates with its supply to the PICA.  Much of supply to posterior circulation comes via posterior communicating arteries.    12/09/17 IR angiogram carotid internal inc arch and cerebral unilateral  1. Successful aspiration thrombectomy of the right middle cerebral artery.  2. TICI 3 of the right middle cerebral artery artery.    Cardiac Imaging   12/11/17 2D Echo w/ CFD:  CONCLUSIONS     1 - Eccentric LVH with severely depressed left ventricular systolic function (EF 10-15%).     2 - Severe left atrial enlargement.     3 - Mildly depressed right ventricular systolic function .     4 - Moderate to severe mitral regurgitation.     5 - Moderate tricuspid regurgitation.     6 - Pulmonary hypertension. The estimated PA systolic pressure is 47 mmHg.     7 - Intermediate central venous pressure.

## 2017-12-11 NOTE — PLAN OF CARE
Problem: Physical Therapy Goal  Goal: Physical Therapy Goal  Outcome: Outcome(s) achieved Date Met: 12/11/17  Initial eval completed.  Results, POC, and therapy recommendations discussed with patient.  Complete evaluation documentation to follow.     Pt requires supervision assistance with transfers and gait at this time. Recommend supervision for safety awareness.     Magdalena Mendieta, PT  12/11/2017  [phone] (pager)

## 2017-12-11 NOTE — PLAN OF CARE
Problem: SLP Goal  Goal: SLP Goal  Speech Language Pathology  Goals expected to be met by 12/17:  1. Pt will tolerate pureed diet and thin liquids with no clinical signs of aspiration.   2. Pt will tolerate trials of regular consistencies without buccal pocketing and timely mastication in order to determine if appropriate for diet advancement.    Outcome: Ongoing (interventions implemented as appropriate)  Remain npo with strict aspiration precautions.    Sarah Loyola MA/ONOFRE-SLP  Speech Language Pathologist  Pager [phone]  12/11/2017

## 2017-12-11 NOTE — CONSULTS
[facility]  Physical Medicine & Rehab  Consult Note    Patient Name: Jana Stewart  MRN: [id number]  Admission Date: 12/9/2017  Hospital Length of Stay: 2 days  Attending Physician: Juventino Merino MD   Primary Care Provider: Paul Gutierres MD     Inpatient consult to Physical Medicine & Rehabilitation  Consult performed by: Niya Jordan NP  Consult requested by:  Juventino Merino MD    Collaborating Physician: Jil Darling MD  Reason for Consult:  assess rehabilitation needs    Consults  Subjective:     Principal Problem: Embolic stroke involving right middle cerebral artery    HPI: Jana Stewart is a 57-year-old male with PMHx of HTN, HLD, CHF, CAD, ZAMZAM, CVA, arthritis, and substance abuse.  Patient presented to [facility] after being found down with left sided weakness and right gaze preference.  On arrival, tox screen positive for cocaine.  Tele-stroke consult completed.  CTH with early R MCA changes; however, not tPA candidate 2/2 outside of treatment window.  Transferred to [facility] on [place] for further evaluation and management.  Upon admission, CTA revealed R M2 occlusion, and he underwent cerebral angiogram with aspiration thrombectomy.  Hospital course complicated by agitation requiring precedex infusion.      Functional History: Patient lives in [place] with his cousin in a single story home without steps to enter.  Prior to admission, he was independent with ADLs, including driving, and mobility.  He is right handed.  DME: none.    Hospital Course: 12/10/17:  Evaluated by SLP and OT.  PT evaluation pending.  SLP bedside swallow evaluation completed.  SLP recommendation: puree diet and thin liquids.  Unwilling to participate in cognitive-linguistic evaluation.  Bed mobility SV.  Sit to stand SBA.  UBD Campbell and LBD Campbell.    Past Medical History:   Diagnosis Date    Anxiety     Arthritis     Asthma     CHF (congestive heart failure)     Coronary artery disease      Depression     Hyperlipidemia     Hypertension     ZAMZAM (obstructive sleep apnea)      Past Surgical History:   Procedure Laterality Date    CARDIAC PACEMAKER PLACEMENT      CARDIAC PACEMAKER PLACEMENT      ELBOW FRACTURE SURGERY Right     FRACTURE SURGERY      stent       Review of patient's allergies indicates:   Allergen Reactions    Bactrim [sulfamethoxazole-trimethoprim] Rash    Ibuprofen Rash    Tramadol Rash       Scheduled Medications:    aspirin  325 mg Oral Daily    atorvastatin  40 mg Oral Daily    senna-docusate 8.6-50 mg  1 tablet Oral BID    sodium chloride 0.9%  3 mL Intravenous Q8H       PRN Medications: ondansetron, sodium chloride 0.9%    Family History     Problem Relation (Age of Onset)    Cancer Father    Diabetes Mellitus Mother, Father    Heart disease Mother, Father    Hypertension Mother, Father        Social History Main Topics    Smoking status: Current Every Day Smoker     Packs/day: 0.03     Types: Cigarettes    Smokeless tobacco: Never Used    Alcohol use No    Drug use:      Frequency: 3.0 times per week     Types: Cocaine, Marijuana      Comment: inhalation every day per pt     Sexual activity: Yes     Partners: Female     Birth control/ protection: Condom     Review of Systems   Constitutional: Negative for chills, fatigue and fever.   HENT: Negative for drooling, hearing loss, trouble swallowing and voice change.    Eyes: Negative for pain and visual disturbance.   Respiratory: Negative for cough, shortness of breath and wheezing.    Cardiovascular: Negative for chest pain and palpitations.   Gastrointestinal: Negative for abdominal pain, nausea and vomiting.   Genitourinary: Negative for difficulty urinating and flank pain.   Musculoskeletal: Positive for arthralgias. Negative for back pain, myalgias and neck pain.   Skin: Negative for rash and wound.   Neurological: Positive for speech difficulty. Negative for dizziness, numbness and headaches.    Psychiatric/Behavioral: Positive for agitation. Negative for hallucinations. The patient is not nervous/anxious.      Objective:     Vital Signs (Most Recent):  Temp: 98 °F (36.7 °C) (17)  Pulse: (!) 58 (17)  Resp: 10 (17)  BP: 125/83 (17)  SpO2: 100 % (17)    Vital Signs (24h Range):  Temp:  [98 °F (36.7 °C)-98.6 °F (37 °C)] 98 °F (36.7 °C)  Pulse:  [55-76] 58  Resp:  [10-54] 10  SpO2:  [96 %-100 %] 100 %  BP: ()/(59-84) 125/83     Body mass index is 23.71 kg/m².    Physical Exam   Constitutional: He is oriented to person, place, and time. He appears well-developed and well-nourished. He is sleeping. He is easily aroused. No distress.   HENT:   Head: Normocephalic and atraumatic.   Right Ear: External ear normal.   Left Ear: External ear normal.   Nose: Nose normal.   Eyes: Right eye exhibits no discharge. Left eye exhibits no discharge. No scleral icterus.   Neck: Normal range of motion.   Cardiovascular: Normal rate, regular rhythm and intact distal pulses.    Pulmonary/Chest: Effort normal. No respiratory distress. He has no wheezes.   Abdominal: Soft. He exhibits no distension. There is no tenderness.   Musculoskeletal: Normal range of motion. He exhibits no edema or tenderness.   Neurological: He is oriented to person, place, and time and easily aroused. He exhibits normal muscle tone.   -  Mental Status:  AAOx3.  Follows commands.  Answers correct age and .  Recent and remote memory intact.  Recalls 3/3 objects.   -  Speech and language:  no aphasia, + dysarthria.   -  Motor:  No pronator drift. RUE: 5/5.  LUE: 4/5.  RLE: 5/5.  LLE: 4/5.  -  Tone:  Normal.   -  Sensory:  Intact to light touch and pin prick.   Skin: Skin is warm and dry. Abrasion (LLE) noted. No rash noted.   Psychiatric: Thought content normal. His speech is slurred. He is agitated. Cognition and memory are impaired.   Vitals reviewed.    Diagnostic Results:   Labs:  Reviewed  X-Ray: Reviewed  CT: Reviewed  CTA: Reviewed    Assessment/Plan:     * Embolic stroke involving right middle cerebral artery    -  Presented being found down with left sided weakness and right gaze preference.  On arrival, tox screen positive for cocaine.    -  CTH with early R MCA changes--> not tPA candidate 2/2 outside of treatment window.   -  CTA revealed R M2 occlusion--> s/p cerebral angiogram with aspiration thrombectomy.     Functional status: see hospital course  Cognitive/Speech/Language status:  Pending--> Unwilling to participate in cognitive-linguistic evaluation.  Nutrition/Swallow Status:  SLP bedside swallow evaluation completed.  SLP recommendation: puree diet and thin liquids.    Recommendations  -  Encourage mobility, OOB in chair at least 3 hours per day, and early ambulation as appropriate   -  PT/OT evaluate and treat  -  SLP speech and cognitive evaluate and treat  -  Monitor sleep disturbances and establish consistent sleep-wake cycle  -  Monitor for bowel and bladder dysfunction  -  Monitor for shoulder pain and subluxation  -  Monitor for spasticity  -  Monitor for and prevent skin breakdown and pressure ulcers  · Early mobility, repositioning/weight shifting every 20-30 minutes when sitting, turn patient every 2 hours, proper mattress/overlay and chair cushioning, pressure relief/heel protector boots  -  DVT prophylaxis:  ROME, SCD  -  Reviewed discharge options (IP rehab, SNF, HH therapy, and OP therapy)        Agitation requiring sedation protocol    -  On precedex gtt        Cocaine abuse    -  + tox screen on admission        Patient with rehab goals; however, currently unwilling/unable to participate with therapy 2/2 agitation requiring intermittent Precedex gtt.  Will sign off.  Please re-consult when patient is medically stable, participating with therapy, and ready for discharge.    Thank you for your consult.    RODNEY Mann  Department of Physical Medicine &  Rehab  [facility]

## 2017-12-11 NOTE — PROGRESS NOTES
[facility]  Neurocritical Care  Progress Note    Admit Date: 12/9/2017  Service Date: 12/11/2017  Length of Stay: 2    Subjective:     Chief Complaint: Embolic stroke involving right middle cerebral artery    History of Present Illness: The patient is a 57 yr old AAM with a history of CVA, HTN, CAD, CHF, dyslipidemia, cardiomyopathy, pacemaker, drug (cocaine, marijuana), tobacco and ETOH use. History and record of events obtained from chart as patient is still confused and disoriented post IR procedure.     The patient  developed chest pain this morning and went to OSH where he was admitted and being worked up for an MI.  This morning he fell getting up out of bed, at OSH. He developed left facial droop, left UE/LE weakness, became agitated and had non sensical speech. He was sent for CT of head which showed multiple areas of ischemic change. A telestroke consult was obtained and the neurologist recommended transfer to [facility] for further stroke evaluation.  His last known normal was 41/2 hours prior to admit at [facility]. Urine tox screen was positive for marijuana and cocaine.  On CTA multiphase a right M1 occlusion was found. He was taken to IR for successful thrombectomy RMCA M1 occlusion, with TICI 3 post procedure. He is being admitted to NICU s/p thrombectomy.    Hospital Course: 12/9 admit to NICU s/p thrombectomy RMCA M1, with TICI 3 post procedure. Will Keep SBP <140 for 24h. CT head pending this evening    12/10- chest pain, resolved spontaneosly. troponins trending down - cardiology consulted, suspect cocaine-induced vs noncardiac, cannot rule out atheroslerotic disease given cocaine abuse    INTERVAL HISTORY : 12/11- no acute events overnight, off precedex , awaiting echo and cxr to determine need for diuresis. No focal deficits on exam, however sometimes needs vocal stimulant to open eyes- lethargy vs poor cooperation. If continues , may get follow up CT as he is likely within peak swelling  period.         Review of Systems: Constitutional: Denies fevers or chills.  Pulmonary: Denies shortness of breath or cough.  Cardiology: Denies chest pain or palpitations.  GI: Denies abdominal pain or constipation.  Neurologic: Denies new weakness, headache, or paresthesias.    Vitals:   Temp: 98 °F (36.7 °C)  Pulse: 60  Rhythm: sinus bradycardia  BP: 118/79  MAP (mmHg): 94  Resp: (!) 21  SpO2: 100 %  O2 Device (Oxygen Therapy): room air    Temp  Min: 98 °F (36.7 °C)  Max: 98.6 °F (37 °C)  Pulse  Min: 55  Max: 76  BP  Min: 97/75  Max: 125/83  MAP (mmHg)  Min: 74  Max: 99  Resp  Min: 10  Max: 54  SpO2  Min: 96 %  Max: 100 %    12/10 0701 - 12/11 0700  In: 2804.6 [I.V.:2004.6]  Out: 1320 [Urine:1320]   Unmeasured Output  Stool Occurrence: 0     Examination:   Constitutional: mal -nourished and -developed. No apparent distress. Frustrated but calm, off sedation   Eyes: Conjunctiva clear, anicteric. Lids no lesions.  Head/Ears/Nose/Mouth/Throat/Neck: dry  mucous membranes. External ears, nose atraumatic.   Cardiovascular: Regular rhythm. No murmurs. No leg edema. Bradycardic, pacemaker spikes not seen on tele  Respiratory: no increased wob, but bilateral wheezes, crackles on left  Gastrointestinal: No hernia. Soft, nondistended, nontender. + bowel sounds.    Neurologic:  -GCS [id number]  -Alert. Oriented to person, place, and time. Speech garbled but understandable  -Cranial nerves intact, no facial droop appreciated  -Motor 5/5 bilateral upper and lower extremities  -Sensation intact elizabeth    Medications:   Continuous Scheduled  aspirin 325 mg Daily   atorvastatin 40 mg Daily   clopidogrel 75 mg Daily   heparin (porcine) 5,000 Units Q8H   senna-docusate 8.6-50 mg 1 tablet BID   sodium chloride 0.9% 3 mL Q8H   PRN  ondansetron 4 mg Q8H PRN   sodium chloride 0.9% 5 mL PRN      Today I independently reviewed pertinent medications, lines/drains/airways, imaging, cardiology, lab results, notably: ct head  Assessment/Plan:      Neuro   * Embolic stroke involving right middle cerebral artery    Hx: prior CVA  R MCA syndrome  S/p thrombectomy R M1, with TICI 3 flow post procedure  Vasc. Neuro following  If no bleed on CT head, will load with plavix 300mg followed by 75mg daily  And ASA 325mg daily  PT/OT/SLP   F/u CT on 12/9 with evolving R mca territory infarc, no signs of hemorrhage. --sqh started  --discuss additional imaging prior to stepdown as patient likely at peak swelling phase of stroke             Cytotoxic cerebral edema    --see stroke        Psychiatric   Cocaine abuse    --posiitve tox screen on admission  --avoid BB  --chest pain on 12/9-10. Resolved, cardiology consulted. Appreciate recs. Suspect cocaine induced but cannot rule out  ACS. troponins trending down.         Anxiety    On xanax 1mg bid at home        Cardiac/Vascular   Chest pain due to myocardial ischemia    --ef 10 in 2016  --follow up echo, CXR  --no resp distress, discuss starting diuretic         Endocrine   Malnutrition    --nutriition consult        Other   Chest pain    Hx CAD, CHF, PM  ECG pending  Echo pending  Trend serial troponin            Prophylaxis:  Venous Thromboembolism: mechanical chemical  Stress Ulcer: None  Ventilator Pneumonia: not applicable     Activity Orders          None        Full Code    Karen Jacobson PA-C  Neurocritical Care  [facility]

## 2017-12-11 NOTE — ASSESSMENT & PLAN NOTE
58 yo M with PMHx CHF and cocaine abuse transferred for new R MCA syndrome. LKN >4.5 hours when evaluated so no alteplase administered. Upon arrival to [facility], patient had CTA MP showing R MCA LVO so he was brought to IR for thrombectomy.      -Antithrombotics for secondary stroke prevention: Antiplatelets:  ASA 81 mg po qd   -Statins for secondary stroke prevention/HLD, if present: Atorvastatin 40 mg po qd (LDL invalid as TGs > 479)  -Aggressive risk factor modification: HLD, Diet, Exercise, drug abuse  -Rehab Efforts: PT/OT/PM&R/SLP  -Diagnostics:     -MRI head without contrast to assess brain parenchyma  VTE Prophylaxis: Heparin 5000 U q8h

## 2017-12-11 NOTE — HOSPITAL COURSE
12/09/17:  Admission to Neuro ICU.  IR for thrombectomy due to R MCA occlusion.  12/10/17:  Patient with slight improvement after procedure.  Some sedation during exam, some of weakness documented likely related to sedated state.  12/11/17:  Patient awake with confusion, but coherent speech.  Moving LUE, LLE more today.  2D Echo showing severely depressed EF (reported by tech at 8%).  12/12/2017 Patient w/ rapid pressured speech. Placement is an issue but patient w/o the need for continuous inpatient placement as he is medically cleared for discharge. Placement per primary team's . Will continue to follow and assist when needed. Will require outpatient SLP therapy   12/13/17:  Pt with continued chest pain.  Noted in previous notes by Neuro ICU.  Appears SOB.  CXR this am with interstitial edema, but not significantly worse than prior.  Patient's exam/symptoms waxing/waning.  Will check BNP, cardiac enzymes, 12 lead EKG to ensure proper work up.  Likely severe CHF at patient's baseline.  12/14/17:  PET scan and repeat Echo by Cardiology today.  Noted opinion that patient should be on anticoagulation.  With patient's insurance as well as current Cr/age/weight, he is able to take apixaban 5 mg po bid.  OT/ST orders printed out and given to patient.  Follow up planned for patient in Vascular Neurology and Cardiology clinic.

## 2017-12-11 NOTE — CONSULTS
[facility]  Cardiology  Consult Note    Patient Name: Jana Stewart  MRN: [id number]  Admission Date: 12/9/2017  Hospital Length of Stay: 1 days  Code Status: Full Code   Attending Provider: Juventino Merino MD   Consulting Provider: Maurice Grimaldo MD  Primary Care Physician: Paul Gutierres MD  Principal Problem:Embolic stroke involving right middle cerebral artery    Patient information was obtained from patient and ER records.     Inpatient consult to Cardiology  Consult performed by: MAURICE GRIMALDO  Consult ordered by: ANDRZEJ MALCOLM        Subjective:     Chief Complaint:  Chest pain    HPI:   57M with hx of HTN, CAD, CVA, CHF (EF 10% 2016), cocaine and EtOH abuse transferred after presenting with chest pain, subsequently found to have CVA R M1 occlusion s/p thrombectomy. Cardiology consulted for Cp. Unable to obtain history as patient was sedated on precedex and not able to arouse him to participate in history.  Per chart, presented with Cp. EKG with LVH, stable, STT changes secondary to LVH without dynamic change. Troponin 0.115. Bnp elevated. No complaints of SOB, no signficant hypertension on admit here.    Past Medical History:   Diagnosis Date    Anxiety     Arthritis     Asthma     CHF (congestive heart failure)     Coronary artery disease     Depression     Hyperlipidemia     Hypertension     ZAMZAM (obstructive sleep apnea)        Past Surgical History:   Procedure Laterality Date    CARDIAC PACEMAKER PLACEMENT      CARDIAC PACEMAKER PLACEMENT      ELBOW FRACTURE SURGERY Right     FRACTURE SURGERY      stent         Review of patient's allergies indicates:   Allergen Reactions    Bactrim [sulfamethoxazole-trimethoprim] Rash    Ibuprofen Rash    Tramadol Rash       No current facility-administered medications on file prior to encounter.      Current Outpatient Prescriptions on File Prior to Encounter   Medication Sig    alprazolam (XANAX) 1 MG tablet  Take 1 mg by mouth 2 (two) times daily as needed for Anxiety.    amiodarone (PACERONE) 200 MG Tab Take 200 mg by mouth 2 (two) times daily.    aspirin (ECOTRIN) 81 MG EC tablet Take 81 mg by mouth once daily.    atorvastatin (LIPITOR) 40 MG tablet Take 40 mg by mouth once daily.    carvedilol (COREG) 25 MG tablet Take 1 tablet (25 mg total) by mouth 2 (two) times daily with meals.    citalopram (CELEXA) 40 MG tablet Take 40 mg by mouth once daily.    clopidogrel (PLAVIX) 75 mg tablet Take 1 tablet (75 mg total) by mouth once daily.    furosemide (LASIX) 40 MG tablet TK 1 T PO  QD    hydrOXYzine pamoate (VISTARIL) 50 MG Cap Take 50 mg by mouth every evening.    isosorbide mononitrate (IMDUR) 30 MG 24 hr tablet Take 1 tablet (30 mg total) by mouth once daily.    lisinopril (PRINIVIL,ZESTRIL) 20 MG tablet Take 1 tablet (20 mg total) by mouth once daily.    midodrine (PROAMATINE) 5 MG Tab Take 5 mg by mouth 3 (three) times daily with meals.     mirtazapine (REMERON) 15 MG tablet Take 2 tablets (30 mg total) by mouth every evening.    potassium chloride SA (K-DUR,KLOR-CON) 20 MEQ tablet Take 20 mEq by mouth 2 (two) times daily.    predniSONE (DELTASONE) 5 MG tablet Take 5 mg by mouth once daily.    spironolactone (ALDACTONE) 25 MG tablet Take 1 tablet (25 mg total) by mouth once daily.    VENTOLIN HFA 90 mcg/actuation inhaler INHALE 1 PUFF Q 4 H PRF WHEEZING     Family History     Problem Relation (Age of Onset)    Cancer Father    Diabetes Mellitus Mother, Father    Heart disease Mother, Father    Hypertension Mother, Father        Social History Main Topics    Smoking status: Current Every Day Smoker     Packs/day: 0.03     Types: Cigarettes    Smokeless tobacco: Never Used    Alcohol use No    Drug use:      Frequency: 3.0 times per week     Types: Cocaine, Marijuana      Comment: inhalation every day per pt     Sexual activity: Yes     Partners: Female     Birth control/ protection: Condom      Review of Systems   Unable to perform ROS: mental status change     Objective:     Vital Signs (Most Recent):  Temp: 98.6 °F (37 °C) (12/10/17 1505)  Pulse: 71 (12/10/17 1805)  Resp: (!) 30 (12/10/17 1805)  BP: (!) 107/59 (12/10/17 1805)  SpO2: 96 % (12/10/17 1805) Vital Signs (24h Range):  Temp:  [98 °F (36.7 °C)-98.6 °F (37 °C)] 98.6 °F (37 °C)  Pulse:  [55-78] 71  Resp:  [10-54] 30  SpO2:  [95 %-100 %] 96 %  BP: ()/(59-93) 107/59     Weight: 70.3 kg (154 lb 15.7 oz)  Body mass index is 23.71 kg/m².    SpO2: 96 %  O2 Device (Oxygen Therapy): room air      Intake/Output Summary (Last 24 hours) at 12/10/17 1916  Last data filed at 12/10/17 1809   Gross per 24 hour   Intake          2687.65 ml   Output              845 ml   Net          1842.65 ml       Lines/Drains/Airways     Drain            Male External Urinary Catheter 12/09/17 1700 1 day          Peripheral Intravenous Line                 Peripheral IV - Single Lumen 12/09/17 0900 Left Forearm 1 day         Peripheral IV - Single Lumen 12/10/17 0300 Left Antecubital less than 1 day                Physical Exam   Constitutional: He appears well-nourished. No distress.   HENT:   Head: Atraumatic.   Eyes: EOM are normal.   Neck: Neck supple. No JVD present.   Cardiovascular: Normal rate, regular rhythm and normal heart sounds.  Exam reveals no gallop and no friction rub.    No murmur heard.  Pulmonary/Chest: Breath sounds normal. No respiratory distress. He has no wheezes.   Abdominal: Soft. Bowel sounds are normal. He exhibits no distension. There is no tenderness.   Musculoskeletal: He exhibits no edema.   Neurological:   somnolent   Skin: Skin is warm and dry. No erythema.   Psychiatric: He has a normal mood and affect.       Significant Labs:   CMP   Recent Labs  Lab 12/09/17  1607 12/09/17  1809 12/10/17  0625    139 140   K 3.5 3.8 2.9*    105 110   CO2 23 25 22*   GLU 86 82 79   BUN 13 12 11   CREATININE 1.3 1.4 1.2   CALCIUM 8.7 9.2  8.2*   PROT 6.8 7.0 6.0   ALBUMIN 3.3* 3.4* 2.9*   BILITOT 1.4* 1.4* 1.0   ALKPHOS 59 57 51*   AST 22 22 19   ALT 16 18 14   ANIONGAP 8 9 8   ESTGFRAFRICA >60.0 >60.0 >60.0   EGFRNONAA >60.0 55.8* >60.0   , CBC   Recent Labs  Lab 12/09/17  1607 12/10/17  0322   WBC 6.12 6.16   HGB 11.0* 10.5*   HCT 32.8* 31.7*    260    and Lipid Panel   Recent Labs  Lab 12/09/17  2329   CHOL 356*   HDL 7*   LDLCALC Invalid, Trig>400.0   TRIG 479*   CHOLHDL 2.0*       Significant Imaging: Echocardiogram:   2D echo with color flow doppler:   Results for orders placed or performed during the hospital encounter of 08/23/16   2D echo with color flow doppler   Result Value Ref Range    EF 10 (A) 55 - 65    Mitral Valve Regurgitation MODERATE (A)     Diastolic Dysfunction Yes (A)     Aortic Valve Regurgitation TRIVIAL     Est. PA Systolic Pressure 21.32     Pericardial Effusion TRIVIAL     Tricuspid Valve Regurgitation MILD      Assessment and Plan:     Chest pain    -By history via chart, suspect cocaine-induced vs noncardiac, cannot rule out atheroslerotic disease given cocaine abuse  -BP well controlled, troponin downtrended 0.11 to 0.09, EKG without dynamic change  -BNP elevated, euvolemic, check echo tomorrow  -PRN nitrates for chest pain  -Would start labetalol 100 bid for HTN  -Stress test as outpatient  -continue asa, statin, can hold off on continuing plavix (loaded last night)            Thank you for your consult. D/w Dr. Carlisle. Please call with any questions.    Maurice Rodas MD  Cardiology Pgy4  [phone]  [facility]

## 2017-12-11 NOTE — PT/OT/SLP PROGRESS
Speech Language Pathology      Jana Stewart  MRN: [id number]    Patient not seen today secondary to on hold for agitation/sedation  . Will follow-up 12/12.    Sarah Loyola MA, CCC-SLP

## 2017-12-11 NOTE — PLAN OF CARE
12/11/17 1403   Discharge Assessment   Assessment Type Discharge Planning Assessment   Confirmed/corrected address and phone number on facesheet? Yes   Assessment information obtained from? Patient   Expected Length of Stay (days) 5   Communicated expected length of stay with patient/caregiver yes   Prior to hospitilization cognitive status: Unable to Assess   Prior to hospitalization functional status: Independent   Current cognitive status: Alert/Oriented  (Patient rambles, but appears to answer questions correctly)   Current Functional Status: Needs Assistance   Lives With other relative(s)  (cousin)   Able to Return to Prior Arrangements unable to determine at this time (comments)   Is patient able to care for self after discharge? Unable to determine at this time (comments)   Who are your caregiver(s) and their phone number(s)? Sri Markham (cousin)  [phone]   Patient's perception of discharge disposition home or selfcare   Readmission Within The Last 30 Days no previous admission in last 30 days   Patient currently being followed by outpatient case management? No   Patient currently receives any other outside agency services? No   Equipment Currently Used at Home none   Do you have any problems affording any of your prescribed medications? TBD   Is the patient taking medications as prescribed? (pernell)   Does the patient have transportation home? (pernell)   Does the patient receive services at the [facility]? No   Discharge Plan A Home   Discharge Plan B Rehab   Patient/Family In Agreement With Plan unable to assess     Patient stated that he has [facility] and that he has a nurse that visits.     Discharge/ My Health Packet Folder Given to patient/family:      Yes        PCP:  Paul Gutierres MD      Pharmacy:    [facility] [id number] [place], [place] - [address] [place] [facility] [address] [address]  Phone: [phone] Fax:  [phone]    [facility] - [place], [place] - [address]  [address] [facility] [facility] [address] [facility] [address]  Phone: [phone] Fax: [phone]        Emergency Contacts:  Extended Emergency Contact Information  Primary Emergency Contact: Sri Markham   [facility] Phone: [phone]  Relation: Relative      Insurance:  Payor: MEDICAID / Plan: [facility]) / Product Type: Managed Medicaid /       Marleny Martin RN, CCRN-K, [facility]  Neuro-Critical Care   X [phone]

## 2017-12-11 NOTE — ASSESSMENT & PLAN NOTE
Hx: prior CVA  R MCA syndrome  S/p thrombectomy R M1, with TICI 3 flow post procedure  Vasc. Neuro following  If no bleed on CT head, will load with plavix 300mg followed by 75mg daily  And ASA 325mg daily  PT/OT/SLP   F/u CT on 12/9 with evolving R mca territory infarc, no signs of hemorrhage. --sqh started  --discuss additional imaging prior to stepdown as patient likely at peak swelling phase of stroke

## 2017-12-11 NOTE — PROGRESS NOTES
Patients output remained low during shift.  Bladder scanner revealed >300 ml in bladder.  Int straight cath performed per order.  Removed 400cc from bladder. Will continue to monitor.

## 2017-12-11 NOTE — PT/OT/SLP PROGRESS
Occupational Therapy      Patient Name:  Jana Stewart   MRN:  [id number]    Patient not seen today secondary to patient on hold per nurse 2* patient sedated 2* agitation with attempts to leave AMA.  Will follow-up 12/12.    LEYDI Bautista  12/11/2017

## 2017-12-11 NOTE — HPI
57M with hx of HTN, CAD, CVA, CHF (EF 10% 2016), cocaine and EtOH abuse transferred after presenting with chest pain, subsequently found to have CVA R M1 occlusion s/p thrombectomy. Cardiology consulted for Cp. Unable to obtain history as patient was sedated on precedex and not able to arouse him to participate in history.  Per chart, presented with Cp. EKG with LVH, stable, STT changes secondary to LVH without dynamic change. Troponin 0.115. Bnp elevated. No complaints of SOB, no signficant hypertension on admit here.

## 2017-12-11 NOTE — PLAN OF CARE
Problem: Patient Care Overview  Goal: Plan of Care Review  Outcome: Ongoing (interventions implemented as appropriate)  POC reviewed with pt at 0400. Pt refused teaching and care throughout night. Patient remained agitated throughout night.  Precedex on and off to achieve RASS of 0. Pt progressing toward goals. Will continue to monitor. See flowsheets for full assessment and VS info

## 2017-12-11 NOTE — SUBJECTIVE & OBJECTIVE
Past Medical History:   Diagnosis Date    Anxiety     Arthritis     Asthma     CHF (congestive heart failure)     Coronary artery disease     Depression     Hyperlipidemia     Hypertension     ZAMZAM (obstructive sleep apnea)        Past Surgical History:   Procedure Laterality Date    CARDIAC PACEMAKER PLACEMENT      CARDIAC PACEMAKER PLACEMENT      ELBOW FRACTURE SURGERY Right     FRACTURE SURGERY      stent         Review of patient's allergies indicates:   Allergen Reactions    Bactrim [sulfamethoxazole-trimethoprim] Rash    Ibuprofen Rash    Tramadol Rash       No current facility-administered medications on file prior to encounter.      Current Outpatient Prescriptions on File Prior to Encounter   Medication Sig    alprazolam (XANAX) 1 MG tablet Take 1 mg by mouth 2 (two) times daily as needed for Anxiety.    amiodarone (PACERONE) 200 MG Tab Take 200 mg by mouth 2 (two) times daily.    aspirin (ECOTRIN) 81 MG EC tablet Take 81 mg by mouth once daily.    atorvastatin (LIPITOR) 40 MG tablet Take 40 mg by mouth once daily.    carvedilol (COREG) 25 MG tablet Take 1 tablet (25 mg total) by mouth 2 (two) times daily with meals.    citalopram (CELEXA) 40 MG tablet Take 40 mg by mouth once daily.    clopidogrel (PLAVIX) 75 mg tablet Take 1 tablet (75 mg total) by mouth once daily.    furosemide (LASIX) 40 MG tablet TK 1 T PO  QD    hydrOXYzine pamoate (VISTARIL) 50 MG Cap Take 50 mg by mouth every evening.    isosorbide mononitrate (IMDUR) 30 MG 24 hr tablet Take 1 tablet (30 mg total) by mouth once daily.    lisinopril (PRINIVIL,ZESTRIL) 20 MG tablet Take 1 tablet (20 mg total) by mouth once daily.    midodrine (PROAMATINE) 5 MG Tab Take 5 mg by mouth 3 (three) times daily with meals.     mirtazapine (REMERON) 15 MG tablet Take 2 tablets (30 mg total) by mouth every evening.    potassium chloride SA (K-DUR,KLOR-CON) 20 MEQ tablet Take 20 mEq by mouth 2 (two) times daily.    predniSONE  (DELTASONE) 5 MG tablet Take 5 mg by mouth once daily.    spironolactone (ALDACTONE) 25 MG tablet Take 1 tablet (25 mg total) by mouth once daily.    VENTOLIN HFA 90 mcg/actuation inhaler INHALE 1 PUFF Q 4 H PRF WHEEZING     Family History     Problem Relation (Age of Onset)    Cancer Father    Diabetes Mellitus Mother, Father    Heart disease Mother, Father    Hypertension Mother, Father        Social History Main Topics    Smoking status: Current Every Day Smoker     Packs/day: 0.03     Types: Cigarettes    Smokeless tobacco: Never Used    Alcohol use No    Drug use:      Frequency: 3.0 times per week     Types: Cocaine, Marijuana      Comment: inhalation every day per pt     Sexual activity: Yes     Partners: Female     Birth control/ protection: Condom     Review of Systems   Unable to perform ROS: mental status change     Objective:     Vital Signs (Most Recent):  Temp: 98.6 °F (37 °C) (12/10/17 1505)  Pulse: 71 (12/10/17 1805)  Resp: (!) 30 (12/10/17 1805)  BP: (!) 107/59 (12/10/17 1805)  SpO2: 96 % (12/10/17 1805) Vital Signs (24h Range):  Temp:  [98 °F (36.7 °C)-98.6 °F (37 °C)] 98.6 °F (37 °C)  Pulse:  [55-78] 71  Resp:  [10-54] 30  SpO2:  [95 %-100 %] 96 %  BP: ()/(59-93) 107/59     Weight: 70.3 kg (154 lb 15.7 oz)  Body mass index is 23.71 kg/m².    SpO2: 96 %  O2 Device (Oxygen Therapy): room air      Intake/Output Summary (Last 24 hours) at 12/10/17 1916  Last data filed at 12/10/17 1809   Gross per 24 hour   Intake          2687.65 ml   Output              845 ml   Net          1842.65 ml       Lines/Drains/Airways     Drain            Male External Urinary Catheter 12/09/17 1700 1 day          Peripheral Intravenous Line                 Peripheral IV - Single Lumen 12/09/17 0900 Left Forearm 1 day         Peripheral IV - Single Lumen 12/10/17 0300 Left Antecubital less than 1 day                Physical Exam   Constitutional: He appears well-nourished. No distress.   HENT:   Head:  Atraumatic.   Eyes: EOM are normal.   Neck: Neck supple. No JVD present.   Cardiovascular: Normal rate, regular rhythm and normal heart sounds.  Exam reveals no gallop and no friction rub.    No murmur heard.  Pulmonary/Chest: Breath sounds normal. No respiratory distress. He has no wheezes.   Abdominal: Soft. Bowel sounds are normal. He exhibits no distension. There is no tenderness.   Musculoskeletal: He exhibits no edema.   Neurological:   somnolent   Skin: Skin is warm and dry. No erythema.   Psychiatric: He has a normal mood and affect.       Significant Labs:   CMP   Recent Labs  Lab 12/09/17  1607 12/09/17  1809 12/10/17  0625    139 140   K 3.5 3.8 2.9*    105 110   CO2 23 25 22*   GLU 86 82 79   BUN 13 12 11   CREATININE 1.3 1.4 1.2   CALCIUM 8.7 9.2 8.2*   PROT 6.8 7.0 6.0   ALBUMIN 3.3* 3.4* 2.9*   BILITOT 1.4* 1.4* 1.0   ALKPHOS 59 57 51*   AST 22 22 19   ALT 16 18 14   ANIONGAP 8 9 8   ESTGFRAFRICA >60.0 >60.0 >60.0   EGFRNONAA >60.0 55.8* >60.0   , CBC   Recent Labs  Lab 12/09/17  1607 12/10/17  0322   WBC 6.12 6.16   HGB 11.0* 10.5*   HCT 32.8* 31.7*    260    and Lipid Panel   Recent Labs  Lab 12/09/17  2329   CHOL 356*   HDL 7*   LDLCALC Invalid, Trig>400.0   TRIG 479*   CHOLHDL 2.0*       Significant Imaging: Echocardiogram:   2D echo with color flow doppler:   Results for orders placed or performed during the hospital encounter of 08/23/16   2D echo with color flow doppler   Result Value Ref Range    EF 10 (A) 55 - 65    Mitral Valve Regurgitation MODERATE (A)     Diastolic Dysfunction Yes (A)     Aortic Valve Regurgitation TRIVIAL     Est. PA Systolic Pressure 21.32     Pericardial Effusion TRIVIAL     Tricuspid Valve Regurgitation MILD

## 2017-12-11 NOTE — ASSESSMENT & PLAN NOTE
--posiitve tox screen on admission  --avoid BB  --chest pain on 12/9-10. Resolved, cardiology consulted. Appreciate recs. Suspect cocaine induced but cannot rule out  ACS. troponins trending down.

## 2017-12-11 NOTE — ASSESSMENT & PLAN NOTE
-By history via chart, suspect cocaine-induced vs noncardiac, cannot rule out atheroslerotic disease given cocaine abuse  -BP well controlled, troponin downtrended 0.11 to 0.09, EKG without dynamic change  -BNP elevated, euvolemic, check echo tomorrow  -PRN nitrates for chest pain  -Would start labetalol 100 bid for HTN  -Stress test as outpatient

## 2017-12-11 NOTE — PT/OT/SLP EVAL
"Physical Therapy Evaluation   Discharge Note    Patient Name:  Jana Stewart   MRN:  [id number]    Recommendations:     Discharge Recommendations:  home (24 hr supervision receommended for safety)   Discharge Equipment Recommendations: none   Barriers to discharge: supervision for safety    Assessment:     Jana Stewart is a 57 y.o. male admitted with a medical diagnosis of Embolic stroke involving right middle cerebral artery. He was independent with mobility prior to admit.  At this time, patient is functioning at their prior level of function and does not require further acute PT services. He demonstrated significant denial of current diagnosis and refused attempts at redirection to more functional tasks.  Recommend supervision for safety after d/c but no additional skilled PT needs.     Recent Surgery: * No surgery found *      Plan:     During this hospitalization, patient does not require further acute PT services.  Please re-consult if situation changes.     Plan of Care Reviewed with: patient    Subjective     Communicated with Rn prior to session.  Patient found supine asleep in front of his lunch tray upon PT entry to room, agreeable to evaluation.      Chief Complaint: denial of diagnosis  Patient comments/goals: "[facility] did this.  They sent me here.  They said I had a stroke.  I didn't have no stroke.  I have heart failure. I went to them for chest pains.  They are lying I didn't have no stroke." - Pt repeated this mantra constantly throughout the session.    Pain/Comfort:  · Pain Rating 1: 0/10  · Pain Rating Post-Intervention 1: 0/10    Patients cultural, spiritual, Yazdanism conflicts given the current situation:      Living Environment:  Pt stated he lived alone, then refused to answer any additional questions.    Prior to admission, patients level of function was independent.  Patient has the following equipment: none.  DME owned (not currently used): none.  Upon " discharge, patient will have assistance from unknown.    Objective:     Patient found with: telemetry, peripheral IV, pulse ox (continuous), blood pressure cuff     General Precautions: Standard, aspiration, fall, seizure     PHYSICAL EXAMINATION  Cognitive Function:  - Oriented to: person only, adamantly refuses that he is in [place]   - Level of Alertness: awake, perseverating on [facility], in denial about diagnosis of stroke, unable to be redirected  - Follows Commands/attention: Easily distracted and Follows one-step commands  - Communication: perseverating on [facility]  - Safety awareness/insight to disability: impaired  Musculoskeletal System  Lower Extremities:  ROM: WFL  Strength: grossly 5/5 in function  Integumentary System: Small skin lesions on L leg  Posture and gross symmetry: no postural abnormalities identified    BALANCE:  Sitting:  - Static: GOOD+: Takes MAXIMAL challenges from all directions.  ;   - Dynamic: GOOD+: Maintains balance through MAXIMAL excursions of active trunk motion;   Standing:  - Static Stand: GOOD+: Takes MAXIMAL challenges from all directions;   - Dynamic stand: GOOD: Needs SUPERVISION only during gait and able to self right with moderate ;     FUNCTIONAL MOBILITY ASSESSMENT:  Bed Mobility: performed with HOB flat  - Rolling/Turning R: supervision   - Rolling/Turning L: supervision   - Supine <> sit: supervision   - Scooting EOB: supervision      Transfers:  - Sit <> stand transfer: supervision     Gait: 10 feet with supervision and no significant gait deviations  - Patient demonstrated limited gait but moved in all directions and performed turning with no significant abnormalities. Pt was not attending to therapist to even attempt gait for longer distances. He moved confidently and without difficulty    THERAPEUTIC ACTIVITIES AND EXERCISES:  Therapist educated patient on the role of PT, POC, and therapy recommendations.  Therapist discussed the  patients current mobility status and level of assistance.  Patient refused teaching and therapist recommendations for assistance with all OOB mobility.White board updated to reflect current level of assistance.     Pt is  safe to perform transfers with nursing using supervision for safety.  Pt is  safe to ambulate with nursing using supervision.       AM-PAC 6 CLICK MOBILITY  Total Score:24     Patient left supine with all lines intact, call button in reach, bed alarm on and rn notified.    GOALS:    Physical Therapy Goals     Not on file          Multidisciplinary Problems (Resolved)        Problem: Physical Therapy Goal    Goal Priority Disciplines Outcome Goal Variances Interventions   Physical Therapy Goal   (Resolved)     PT/OT, PT Outcome(s) achieved                     History:     Past Medical History:   Diagnosis Date    Anxiety     Arthritis     Asthma     CHF (congestive heart failure)     Coronary artery disease     Depression     Hyperlipidemia     Hypertension     ZAMZAM (obstructive sleep apnea)        Past Surgical History:   Procedure Laterality Date    CARDIAC PACEMAKER PLACEMENT      CARDIAC PACEMAKER PLACEMENT      ELBOW FRACTURE SURGERY Right     FRACTURE SURGERY      stent         Clinical Decision Making:     Comorbidities and personal factors that affect the PT plan of care or the patient's ability to participate or progress with therapy:  1. Cocaine abuse  2. Anxiety   3. MI  4. Chronic CHF  5. Substance abuse     Clinical Presentation:  unstable/unpredictable characteristics  Patient evaluated in the ICU with varying levels of awareness or cognitive performance 2 man assist for safety and camera in room.     Level of Complexity:   High Complexity:   · At least 3 or more personal factors or comorbidities that impact the plan of care  · Examination addressing 4 or more body structures and functions, activity limitations, and/or participation restrictions  · Clinical presentation  with unstable or unpredictable characteristics     Time Tracking:     PT Received On: 12/11/17  PT Start Time: 1500     PT Stop Time: 1515  PT Total Time (min): 15 min     Billable Minutes: Evaluation 15      Magdalena Mendieta, PT  12/11/2017

## 2017-12-11 NOTE — PROGRESS NOTES
Notified NCC of patients EKG showing continuous Pacer Spikes. Leti GIANG at bedside.  Previously no pacer spikes present.  Pulses checked. Cuff cycled multiple times, patient BP stable. Precedex stopped. Pacer spikes now resolved. Per MD if happens again check pulse and determine if cords/leads faulty. Will continue to monitor.

## 2017-12-12 LAB
ALBUMIN SERPL BCP-MCNC: 2.8 G/DL
ALP SERPL-CCNC: 48 U/L
ALT SERPL W/O P-5'-P-CCNC: 10 U/L
ANION GAP SERPL CALC-SCNC: 6 MMOL/L
ANISOCYTOSIS BLD QL SMEAR: SLIGHT
AST SERPL-CCNC: 19 U/L
BASOPHILS # BLD AUTO: 0.03 K/UL
BASOPHILS NFR BLD: 0.6 %
BILIRUB SERPL-MCNC: 0.6 MG/DL
BUN SERPL-MCNC: 15 MG/DL
CALCIUM SERPL-MCNC: 8.2 MG/DL
CHLORIDE SERPL-SCNC: 109 MMOL/L
CO2 SERPL-SCNC: 21 MMOL/L
CREAT SERPL-MCNC: 1.3 MG/DL
DIFFERENTIAL METHOD: ABNORMAL
EOSINOPHIL # BLD AUTO: 0.1 K/UL
EOSINOPHIL NFR BLD: 1.2 %
ERYTHROCYTE [DISTWIDTH] IN BLOOD BY AUTOMATED COUNT: 23.2 %
EST. GFR  (AFRICAN AMERICAN): >60 ML/MIN/1.73 M^2
EST. GFR  (NON AFRICAN AMERICAN): >60 ML/MIN/1.73 M^2
GLUCOSE SERPL-MCNC: 99 MG/DL
HCT VFR BLD AUTO: 29.4 %
HGB BLD-MCNC: 9.9 G/DL
IMM GRANULOCYTES # BLD AUTO: 0.01 K/UL
IMM GRANULOCYTES NFR BLD AUTO: 0.2 %
LYMPHOCYTES # BLD AUTO: 1.6 K/UL
LYMPHOCYTES NFR BLD: 32.9 %
MAGNESIUM SERPL-MCNC: 1.9 MG/DL
MCH RBC QN AUTO: 28.4 PG
MCHC RBC AUTO-ENTMCNC: 33.7 G/DL
MCV RBC AUTO: 84 FL
MONOCYTES # BLD AUTO: 0.5 K/UL
MONOCYTES NFR BLD: 10.3 %
NEUTROPHILS # BLD AUTO: 2.7 K/UL
NEUTROPHILS NFR BLD: 54.8 %
NRBC BLD-RTO: 0 /100 WBC
PHOSPHATE SERPL-MCNC: 3.2 MG/DL
PLATELET # BLD AUTO: 231 K/UL
PLATELET BLD QL SMEAR: ABNORMAL
PMV BLD AUTO: 9.7 FL
POCT GLUCOSE: 107 MG/DL (ref 70–110)
POIKILOCYTOSIS BLD QL SMEAR: SLIGHT
POTASSIUM SERPL-SCNC: 4 MMOL/L
PROT SERPL-MCNC: 6 G/DL
RBC # BLD AUTO: 3.49 M/UL
SODIUM SERPL-SCNC: 136 MMOL/L
TARGETS BLD QL SMEAR: ABNORMAL
VIT B12 SERPL-MCNC: 711 PG/ML
WBC # BLD AUTO: 4.84 K/UL

## 2017-12-12 PROCEDURE — 20600001 HC STEP DOWN PRIVATE ROOM

## 2017-12-12 PROCEDURE — 25000003 PHARM REV CODE 250: Performed by: NURSE PRACTITIONER

## 2017-12-12 PROCEDURE — 97535 SELF CARE MNGMENT TRAINING: CPT

## 2017-12-12 PROCEDURE — 92526 ORAL FUNCTION THERAPY: CPT

## 2017-12-12 PROCEDURE — 85025 COMPLETE CBC W/AUTO DIFF WBC: CPT

## 2017-12-12 PROCEDURE — 63600175 PHARM REV CODE 636 W HCPCS: Performed by: PHYSICIAN ASSISTANT

## 2017-12-12 PROCEDURE — 25000003 PHARM REV CODE 250: Performed by: PSYCHIATRY & NEUROLOGY

## 2017-12-12 PROCEDURE — 94761 N-INVAS EAR/PLS OXIMETRY MLT: CPT

## 2017-12-12 PROCEDURE — 83735 ASSAY OF MAGNESIUM: CPT

## 2017-12-12 PROCEDURE — A4216 STERILE WATER/SALINE, 10 ML: HCPCS | Performed by: NURSE PRACTITIONER

## 2017-12-12 PROCEDURE — 63600175 PHARM REV CODE 636 W HCPCS

## 2017-12-12 PROCEDURE — 99233 SBSQ HOSP IP/OBS HIGH 50: CPT | Mod: ,,, | Performed by: PSYCHIATRY & NEUROLOGY

## 2017-12-12 PROCEDURE — 25000003 PHARM REV CODE 250: Performed by: PHYSICIAN ASSISTANT

## 2017-12-12 PROCEDURE — 80053 COMPREHEN METABOLIC PANEL: CPT

## 2017-12-12 PROCEDURE — 99233 SBSQ HOSP IP/OBS HIGH 50: CPT | Mod: ,,, | Performed by: PHYSICIAN ASSISTANT

## 2017-12-12 PROCEDURE — 84100 ASSAY OF PHOSPHORUS: CPT

## 2017-12-12 RX ORDER — LORAZEPAM 2 MG/ML
INJECTION INTRAMUSCULAR
Status: COMPLETED
Start: 2017-12-12 | End: 2017-12-12

## 2017-12-12 RX ORDER — QUETIAPINE FUMARATE 25 MG/1
50 TABLET, FILM COATED ORAL EVERY 12 HOURS
Status: DISCONTINUED | OUTPATIENT
Start: 2017-12-12 | End: 2017-12-13

## 2017-12-12 RX ORDER — CARVEDILOL 3.12 MG/1
3.12 TABLET ORAL 2 TIMES DAILY
Status: DISCONTINUED | OUTPATIENT
Start: 2017-12-12 | End: 2017-12-13

## 2017-12-12 RX ORDER — LORAZEPAM 0.5 MG/1
1 TABLET ORAL EVERY 4 HOURS PRN
Status: DISCONTINUED | OUTPATIENT
Start: 2017-12-12 | End: 2017-12-12

## 2017-12-12 RX ADMIN — LORAZEPAM 1 MG: 2 INJECTION, SOLUTION INTRAMUSCULAR; INTRAVENOUS at 11:12

## 2017-12-12 RX ADMIN — Medication 3 ML: at 05:12

## 2017-12-12 RX ADMIN — Medication 100 MG: at 10:12

## 2017-12-12 RX ADMIN — QUETIAPINE FUMARATE 50 MG: 25 TABLET, FILM COATED ORAL at 10:12

## 2017-12-12 RX ADMIN — CLOPIDOGREL 75 MG: 75 TABLET, FILM COATED ORAL at 10:12

## 2017-12-12 RX ADMIN — HEPARIN SODIUM 5000 UNITS: 5000 INJECTION, SOLUTION INTRAVENOUS; SUBCUTANEOUS at 08:12

## 2017-12-12 RX ADMIN — QUETIAPINE FUMARATE 50 MG: 25 TABLET, FILM COATED ORAL at 08:12

## 2017-12-12 RX ADMIN — ASPIRIN 325 MG ORAL TABLET 325 MG: 325 PILL ORAL at 10:12

## 2017-12-12 RX ADMIN — STANDARDIZED SENNA CONCENTRATE AND DOCUSATE SODIUM 1 TABLET: 8.6; 5 TABLET, FILM COATED ORAL at 08:12

## 2017-12-12 RX ADMIN — FOLIC ACID 1 MG: 1 TABLET ORAL at 10:12

## 2017-12-12 RX ADMIN — ATORVASTATIN CALCIUM 80 MG: 20 TABLET, FILM COATED ORAL at 10:12

## 2017-12-12 RX ADMIN — CARVEDILOL 3.12 MG: 3.12 TABLET, FILM COATED ORAL at 08:12

## 2017-12-12 RX ADMIN — HEPARIN SODIUM 5000 UNITS: 5000 INJECTION, SOLUTION INTRAVENOUS; SUBCUTANEOUS at 05:12

## 2017-12-12 RX ADMIN — HEPARIN SODIUM 5000 UNITS: 5000 INJECTION, SOLUTION INTRAVENOUS; SUBCUTANEOUS at 02:12

## 2017-12-12 RX ADMIN — STANDARDIZED SENNA CONCENTRATE AND DOCUSATE SODIUM 1 TABLET: 8.6; 5 TABLET, FILM COATED ORAL at 10:12

## 2017-12-12 NOTE — PLAN OF CARE
ICU Attending Note  Neurocritical Care    [id number]    -aspirin, clopidogrel for cardiac stents >stroke  -atorvastatin  -advise cocaine cessation  -quetiapine 50 mg q12h  --140  -start carvedilol 3.125 mg q12h  -prophylaxis  -coordinate transfer to floor on Stroke

## 2017-12-12 NOTE — PT/OT/SLP PROGRESS
Speech Language Pathology Treatment    Patient Name:  Jana Stewart   MRN:  [id number]  Admitting Diagnosis: Embolic stroke involving right middle cerebral artery    Recommendations:                 General Recommendations:  Dysphagia therapy and Speech/language therapy  Diet recommendations:  Puree, Liquid Diet Level: Thin   Aspiration Precautions: 1 bite/sip at a time, Assistance with meals, Avoid talking while eating, Eliminate distractions, Meds whole 1 at a time, Small bites/sips and Standard aspiration precautions   General Precautions: Standard, aspiration, fall  Communication strategies:  none    Subjective     Pt awake; talking consistently during session on telephone; mildly agitated   Patient goals: to leave the hospital     Pain/Comfort:  · Pain Rating 1: 0/10  · Pain Rating Post-Intervention 1: 0/10    Objective:     Has the patient been evaluated by SLP for swallowing?   Yes  Keep patient NPO? No   Current Respiratory Status: room air      RN denied pt difficulty with current diet.  Throughout entire session, pt appearing to be speaking to  of an insurance company on telephone. Pt tolerated 3 full spoonfuls puree alternating with 3 oz thin liquid via straw without symptoms of airway compromise. Oral phase cb adequate oral acceptance with moderate oral holding that benefited from cue to initiate pharyngeal swallow that deemed contributed to poor attention to task and talking while eating; symptoms consistent with premature spillage x1. Cracker not attempted 2/2 pt with poor attention to task and consistent talking. Visual cue attempted to redirect attention that resulted in increased agitation.  Dysarthria of speech observed during conversation. Obvious impairment to insight evident with additional cognitive impairment that warrants further assessment if pt is cooperative. Skilled education provided on aspiration precautions and diet recommendations. Whiteboard updated with diet  recommendations/aspiration precautions.  No further questions. RN notified of recs post assessment    Assessment:     Jana Stewart is a 57 y.o. male with an SLP diagnosis of Dysphagia; cognitive-linguistic impairment, dysarthria  He presents with adequate diet tolerance. Pt would benefit from speech language therapy pending compliance.    Goals:    SLP Goals        Problem: SLP Goal    Goal Priority Disciplines Outcome   SLP Goal     SLP Ongoing (interventions implemented as appropriate)   Description:  Speech Language Pathology  Goals expected to be met by 12/17:  1. Pt will tolerate pureed diet and thin liquids with no clinical signs of aspiration.   2. Pt will tolerate trials of regular consistencies without buccal pocketing and timely mastication in order to determine if appropriate for diet advancement.                     Plan:     · Patient to be seen:  3 x/week   · Plan of Care expires:  01/08/17  · Plan of Care reviewed with:  patient   · SLP Follow-Up:  Yes       Discharge recommendations:  outpatient speech therapy (pending PT OT recs; needs 24 hour supervision)   Barriers to Discharge:  Safety Awareness poor insight; cognitive impairment    Time Tracking:     SLP Treatment Date:   12/12/17  Speech Start Time:  1033  Speech Stop Time:  1041     Speech Total Time (min):  8 min    Billable Minutes: Treatment Swallowing Dysfunction 8    Hallie Kruger M.A. CCC-SLP  Speech Language Pathologist  [phone]  12/12/2017

## 2017-12-12 NOTE — PT/OT/SLP PROGRESS
Occupational Therapy   Treatment    Name: Jana Stewart  MRN: [id number]  Admitting Diagnosis:  Embolic stroke involving right middle cerebral artery       Recommendations:     Discharge Recommendations: home  Discharge Equipment Recommendations:  none  Barriers to discharge:  None    Subjective     Communicated with:  nurse prior to session.  Pain/Comfort:  · Pain Rating 1: 0/10  · Pain Rating Post-Intervention 1: 0/10    Objective:     Patient found with: telemetry, peripheral IV, pulse ox (continuous), blood pressure cuff  Family not present.    General Precautions: Standard, aspiration, fall, seizure   Orthopedic Precautions:N/A   Braces: N/A     Occupational Performance:    Bed Mobility:    · Patient completed Rolling/Turning to Left with modified independence  · Patient completed Rolling/Turning to Right with modified independence  · Patient completed Scooting/Bridging with modified independence  · Patient completed Supine to Sit with modified independence  · Patient completed Sit to Supine with modified independence     Functional Mobility/Transfers:  · Patient completed Sit <> Stand Transfer with SBA with  no assistive device      Activities of Daily Living:  · Feeding:  NPO  · Grooming: stand by assistance while seated EOB  · UB Dressing: stand by assistance   · LB Dressing: stand by assistance     Cognitive/Visual Perceptual:  Cognitive/Psychosocial Skills:     -       Oriented to: Person, Place and Time   -       Follows Commands/attention:Follows one-step commands  Patient left supine with all lines intact and call button in reach    AMPA 6 Click:  AMPAC Total Score: 18    Treatment & Education:  Patient education provided on role of OT and need for continued OT upon discharge.  Patient education provided on dressing technique, and transfers. Patient verbalizing understanding via teach back method. Patient instructed on need to call for assistance for toileting needs and when getting up.  Continued  education, patient/ family training recommended.  Patient alert and oriented x 3; able to follow 4/4 one step commands.  Patient attentive and interactive throughout the session.  Patient's functional status and disposition recommendation discussed with patient and nurse.  White board updated in patient's room.  OT asked if there were any other questions; patient/ family had no further questions.  Education:    Assessment:     Jana Stewart is a 57 y.o. male with a medical diagnosis of Embolic stroke involving right middle cerebral artery.  He presents with performance deficits affecting function are weakness, impaired sensation, impaired endurance, impaired self care skills, impaired functional mobilty, impaired balance, gait instability.  These performance deficits have resulted in activity limitations including but not limited to: transfers, ascending/ descending stairs, walking short and long distances, walking around obstacles, transitional movement patterns (kneeling, bending); eating, upper body dressing, lower body dressing, brushing teeth, toileting, and writing.   Patient's role as father, cousin and independent caretaker for self has been affected. Patient will benefit from skilled OT services to maximize level of independence with self-care skills and functional mobility.      Rehab Prognosis:  Good; patient would benefit from acute skilled OT services to address these deficits and reach maximum level of function.       Plan:     Patient to be seen 4 x/week to address the above listed problems via self-care/home management, cognitive retraining, therapeutic activities, therapeutic exercises, sensory integration, neuromuscular re-education  · Plan of Care Expires: 01/07/18  · Plan of Care Reviewed with: patient    This Plan of care has been discussed with the patient who was involved in its development and understands and is in agreement with the identified goals and treatment plan    GOALS:     Occupational Therapy Goals        Problem: Occupational Therapy Goal    Goal Priority Disciplines Outcome Interventions   Occupational Therapy Goal     OT, PT/OT     Description:  Goals set 12/10 to be addressed for 7 days with expiration date, 12/17:  Patient will increase functional independence with ADLs by performing:  Patient will demonstrate stand pivot transfers with modified independence.   Not met  Patient will demonstrate grooming while standing with modified independence.   Not met  Patient will demonstrate upper body dressing with modified independence.   Not met  Patient will demonstrate lower body dressing with modified independence.   Not met  Patient will demonstrate toileting with modified independence.   Not met  Patient will demonstrate bathing while seated EOB with modified independence.   Not met  Patient's family / caregiver will demonstrate independence and safety with assisting patient with self-care skills and functional mobility.     Not met  Patient and/or patient's family will verbalize understanding of stroke prevention guidelines, personal risk factors and stroke warning signs via teachback method.  Not met                           Time Tracking:     OT Date of Treatment: 12/12/17  OT Start Time: 0900  OT Stop Time: 0924  OT Total Time (min): 24 min    Billable Minutes:Self Care/Home Management 24    LEYDI Bautista  12/12/2017

## 2017-12-12 NOTE — PROGRESS NOTES
[facility]  Neurocritical Care  Progress Note    Admit Date: 12/9/2017  Service Date: 12/12/2017  Length of Stay: 3    Subjective:     Chief Complaint: Embolic stroke involving right middle cerebral artery    History of Present Illness: The patient is a 57 yr old AAM with a history of CVA, HTN, CAD, CHF, dyslipidemia, cardiomyopathy, pacemaker, drug (cocaine, marijuana), tobacco and ETOH use. History and record of events obtained from chart as patient is still confused and disoriented post IR procedure.     The patient  developed chest pain this morning and went to OSH where he was admitted and being worked up for an MI.  This morning he fell getting up out of bed, at OSH. He developed left facial droop, left UE/LE weakness, became agitated and had non sensical speech. He was sent for CT of head which showed multiple areas of ischemic change. A telestroke consult was obtained and the neurologist recommended transfer to [facility] for further stroke evaluation.  His last known normal was 41/2 hours prior to admit at [facility]. Urine tox screen was positive for marijuana and cocaine.  On CTA multiphase a right M1 occlusion was found. He was taken to IR for successful thrombectomy RMCA M1 occlusion, with TICI 3 post procedure. He is being admitted to NICU s/p thrombectomy.    Hospital Course: 12/9 admit to NICU s/p thrombectomy RMCA M1, with TICI 3 post procedure. Will Keep SBP <140 for 24h. CT head pending this evening    12/10- chest pain, resolved spontaneosly. troponins trending down - cardiology consulted, suspect cocaine-induced vs noncardiac, cannot rule out atheroslerotic disease given cocaine abuse      12/11- no acute events overnight, off precedex , awaiting echo and cxr to determine need for diuresis. No focal deficits on exam, however sometimes needs vocal stimulant to open eyes- lethargy vs poor cooperation. If continues , may get follow up CT as he is likely within peak swelling  period.    INTERVAL 12/12- off precedex, doing well with seroquel and stable for ttf. EKG ordered for tomorrow to eval QTc. Continuing aspirin , plavix . Repeat  Echo EF 10. Patient seen by cardiology who is following.            Review of Systems: Constitutional: Denies fevers or chills.  Pulmonary: Denies shortness of breath or cough.  Cardiology: Denies chest pain or palpitations.  GI: Denies abdominal pain or constipation.  Neurologic: Denies new weakness, headache, or paresthesias.    Vitals:   Temp: 98.9 °F (37.2 °C)  Pulse: 89  Rhythm: normal sinus rhythm  BP: (!) 137/102  MAP (mmHg): 113  Resp: 16  SpO2: (!) 93 %  O2 Device (Oxygen Therapy): room air    Temp  Min: 97.6 °F (36.4 °C)  Max: 98.9 °F (37.2 °C)  Pulse  Min: 58  Max: 93  BP  Min: 111/74  Max: 137/102  MAP (mmHg)  Min: 72  Max: 113  Resp  Min: 6  Max: 33  SpO2  Min: 79 %  Max: 100 %    12/11 0701 - 12/12 0700  In: 2178.9 [I.V.:2178.9]  Out: 400 [Urine:400]   Unmeasured Output  Urine Occurrence: 1  Stool Occurrence: 0  Pad Count: 2     Examination:   Constitutional: mal -nourished and -developed. No apparent distress. Frustrated but calm, off sedation   Eyes: Conjunctiva clear, anicteric. Lids no lesions.  Head/Ears/Nose/Mouth/Throat/Neck: dry  mucous membranes. External ears, nose atraumatic.   Cardiovascular: Regular rhythm. No murmurs. No leg edema. Bradycardic, pacemaker spikes not seen on tele  Respiratory: no increased wob, but bilateral wheezes, crackles on left  Gastrointestinal: No hernia. Soft, nondistended, nontender. + bowel sounds.    Neurologic:  -GCS [id number]  -Alert. Oriented to person, place, and time. Speech garbled but understandable  -Cranial nerves intact, no facial droop appreciated  -Motor 5/5 bilateral upper and lower extremities  -Sensation intact elizabeth    Medications:   Continuous Scheduled    aspirin 325 mg Daily   atorvastatin 80 mg Daily   carvedilol 3.125 mg BID   clopidogrel 75 mg Daily   folic acid 1 mg Daily   heparin  (porcine) 5,000 Units Q8H   QUEtiapine 50 mg Q12H   senna-docusate 8.6-50 mg 1 tablet BID   sodium chloride 0.9% 3 mL Q8H   thiamine 100 mg Daily   PRN    ondansetron 4 mg Q8H PRN   sodium chloride 0.9% 5 mL PRN      Today I independently reviewed pertinent medications, lines/drains/airways, imaging, cardiology, lab results, notably: ct head  Assessment/Plan:     Neuro   * Embolic stroke involving right middle cerebral artery    Hx: prior CVA  R MCA syndrome  S/p thrombectomy R M1, with TICI 3 flow post procedure  Vasc. Neuro following  If no bleed on CT head, will load with plavix 300mg followed by 75mg daily  And ASA 325mg daily  PT/OT/SLP                Cytotoxic cerebral edema    --see stroke        Psychiatric   Cocaine abuse    --posiitve tox screen on admission  --withdrawal symptoms improved  --seroquel         Anxiety    On xanax 1mg bid at home        Cardiac/Vascular   Chronic systolic CHF (congestive heart failure)    --pacemaker  --EF 10, history of stents  --cardiology consulted  --coreg  --asa, plavix statin         Chest pain due to myocardial ischemia    --ef 10 in 2016  --follow up echo, CXR  --no resp distress, discuss starting diuretic         Endocrine   Malnutrition    --nutriition consult            Prophylaxis:  Venous Thromboembolism: mechanical chemical  Stress Ulcer: None  Ventilator Pneumonia: not applicable     Activity Orders          None        Full Code    Karen Jacobson PA-C  Neurocritical Care  [facility]

## 2017-12-12 NOTE — PLAN OF CARE
SW left message for Pt relative regarding Pt pending discharge.    SW spoke with Pt brother who [place] who reported he is homeless but stays with the cousin. He lost his house when he went to long term and hasn't been able to get on his feet yet.    Hannah Rader, JEFFREY  Neurocritical Care   [facility]  [phone]

## 2017-12-12 NOTE — PLAN OF CARE
Problem: SLP Goal  Goal: SLP Goal  Speech Language Pathology  Goals expected to be met by 12/17:  1. Pt will tolerate pureed diet and thin liquids with no clinical signs of aspiration.   2. Pt will tolerate trials of regular consistencies without buccal pocketing and timely mastication in order to determine if appropriate for diet advancement.      Pt with adequate diet tolerance of puree food and thin liquids.    Hallie Kruger M.A. CCC-SLP  Speech Language Pathologist  [phone]  12/12/2017

## 2017-12-12 NOTE — PROGRESS NOTES
POC reviewed with pt 1400. Pt unwilling to verbalized understanding. Pt largely noncompliant and uncooperative, pt remains agitated throughout shift. No acute events today. Pt progressing toward goals. Will continue to monitor. See flowsheets for full assessment and VS info.

## 2017-12-12 NOTE — PLAN OF CARE
Problem: Occupational Therapy Goal  Goal: Occupational Therapy Goal  Goals set 12/10 to be addressed for 7 days with expiration date, 12/17:  Patient will increase functional independence with ADLs by performing:  Patient will demonstrate stand pivot transfers with modified independence.   Not met  Patient will demonstrate grooming while standing with modified independence.   Not met  Patient will demonstrate upper body dressing with modified independence.   Not met  Patient will demonstrate lower body dressing with modified independence.   Not met  Patient will demonstrate toileting with modified independence.   Not met  Patient will demonstrate bathing while seated EOB with modified independence.   Not met  Patient's family / caregiver will demonstrate independence and safety with assisting patient with self-care skills and functional mobility.     Not met  Patient and/or patient's family will verbalize understanding of stroke prevention guidelines, personal risk factors and stroke warning signs via teachback method.  Not met          Goals remain appropriate.  LEYDI Bautista  12/12/2017

## 2017-12-12 NOTE — PLAN OF CARE
SW reviewed chart for DC planning needs. CM note reports this Pt has RN care with [facility]. SW sent facesheet and H&P via [facility]. Since Pt has Medicaid, he would only be receiving RN care. The recs are for Outpt OT and 24 hour care for PT.     Hannah Rader, JEFFREY  Neurocritical Care   [facility]  [phone]

## 2017-12-13 PROBLEM — I50.43 ACUTE ON CHRONIC COMBINED SYSTOLIC AND DIASTOLIC CONGESTIVE HEART FAILURE: Status: ACTIVE | Noted: 2017-12-13

## 2017-12-13 PROBLEM — I50.43 ACUTE ON CHRONIC COMBINED SYSTOLIC AND DIASTOLIC CONGESTIVE HEART FAILURE: Status: ACTIVE | Noted: 2017-12-11

## 2017-12-13 LAB
ALBUMIN SERPL BCP-MCNC: 2.9 G/DL
ALP SERPL-CCNC: 57 U/L
ALT SERPL W/O P-5'-P-CCNC: 13 U/L
ANION GAP SERPL CALC-SCNC: 7 MMOL/L
ANISOCYTOSIS BLD QL SMEAR: SLIGHT
AST SERPL-CCNC: 36 U/L
BASOPHILS # BLD AUTO: 0.02 K/UL
BASOPHILS NFR BLD: 0.3 %
BILIRUB SERPL-MCNC: 0.7 MG/DL
BNP SERPL-MCNC: 4136 PG/ML
BUN SERPL-MCNC: 11 MG/DL
CALCIUM SERPL-MCNC: 8.5 MG/DL
CHLORIDE SERPL-SCNC: 109 MMOL/L
CK MB SERPL-MCNC: 10.1 NG/ML
CK MB SERPL-RTO: 5.2 %
CK SERPL-CCNC: 194 U/L
CO2 SERPL-SCNC: 22 MMOL/L
CREAT SERPL-MCNC: 1.3 MG/DL
DIFFERENTIAL METHOD: ABNORMAL
EOSINOPHIL # BLD AUTO: 0 K/UL
EOSINOPHIL NFR BLD: 0.6 %
ERYTHROCYTE [DISTWIDTH] IN BLOOD BY AUTOMATED COUNT: 23.6 %
EST. GFR  (AFRICAN AMERICAN): >60 ML/MIN/1.73 M^2
EST. GFR  (NON AFRICAN AMERICAN): >60 ML/MIN/1.73 M^2
GLUCOSE SERPL-MCNC: 76 MG/DL
HCT VFR BLD AUTO: 29.9 %
HGB BLD-MCNC: 10 G/DL
IMM GRANULOCYTES # BLD AUTO: 0.02 K/UL
IMM GRANULOCYTES NFR BLD AUTO: 0.3 %
LYMPHOCYTES # BLD AUTO: 1.2 K/UL
LYMPHOCYTES NFR BLD: 17 %
MAGNESIUM SERPL-MCNC: 1.7 MG/DL
MCH RBC QN AUTO: 28.1 PG
MCHC RBC AUTO-ENTMCNC: 33.4 G/DL
MCV RBC AUTO: 84 FL
MONOCYTES # BLD AUTO: 0.8 K/UL
MONOCYTES NFR BLD: 11.6 %
NEUTROPHILS # BLD AUTO: 4.9 K/UL
NEUTROPHILS NFR BLD: 70.2 %
NRBC BLD-RTO: 0 /100 WBC
OVALOCYTES BLD QL SMEAR: ABNORMAL
PHOSPHATE SERPL-MCNC: 3.6 MG/DL
PLATELET # BLD AUTO: 236 K/UL
PLATELET BLD QL SMEAR: ABNORMAL
PMV BLD AUTO: 9.9 FL
POIKILOCYTOSIS BLD QL SMEAR: SLIGHT
POTASSIUM SERPL-SCNC: 3.6 MMOL/L
PROT SERPL-MCNC: 6.1 G/DL
RBC # BLD AUTO: 3.56 M/UL
SODIUM SERPL-SCNC: 138 MMOL/L
TARGETS BLD QL SMEAR: ABNORMAL
TROPONIN I SERPL DL<=0.01 NG/ML-MCNC: 2.52 NG/ML
TROPONIN I SERPL DL<=0.01 NG/ML-MCNC: 2.67 NG/ML
WBC # BLD AUTO: 7 K/UL

## 2017-12-13 PROCEDURE — 93282 PRGRMG EVAL IMPLANTABLE DFB: CPT

## 2017-12-13 PROCEDURE — 84484 ASSAY OF TROPONIN QUANT: CPT

## 2017-12-13 PROCEDURE — 93005 ELECTROCARDIOGRAM TRACING: CPT

## 2017-12-13 PROCEDURE — 25000003 PHARM REV CODE 250: Performed by: PHYSICIAN ASSISTANT

## 2017-12-13 PROCEDURE — A4216 STERILE WATER/SALINE, 10 ML: HCPCS | Performed by: NURSE PRACTITIONER

## 2017-12-13 PROCEDURE — 93282 PRGRMG EVAL IMPLANTABLE DFB: CPT | Mod: 26,,, | Performed by: INTERNAL MEDICINE

## 2017-12-13 PROCEDURE — 85025 COMPLETE CBC W/AUTO DIFF WBC: CPT

## 2017-12-13 PROCEDURE — 84100 ASSAY OF PHOSPHORUS: CPT

## 2017-12-13 PROCEDURE — 63600175 PHARM REV CODE 636 W HCPCS: Performed by: PHYSICIAN ASSISTANT

## 2017-12-13 PROCEDURE — 83735 ASSAY OF MAGNESIUM: CPT

## 2017-12-13 PROCEDURE — 25000003 PHARM REV CODE 250: Performed by: NURSE PRACTITIONER

## 2017-12-13 PROCEDURE — 82553 CREATINE MB FRACTION: CPT

## 2017-12-13 PROCEDURE — 36415 COLL VENOUS BLD VENIPUNCTURE: CPT

## 2017-12-13 PROCEDURE — 99232 SBSQ HOSP IP/OBS MODERATE 35: CPT | Mod: ,,, | Performed by: INTERNAL MEDICINE

## 2017-12-13 PROCEDURE — 97535 SELF CARE MNGMENT TRAINING: CPT

## 2017-12-13 PROCEDURE — 99233 SBSQ HOSP IP/OBS HIGH 50: CPT | Mod: ,,, | Performed by: PSYCHIATRY & NEUROLOGY

## 2017-12-13 PROCEDURE — 93010 ELECTROCARDIOGRAM REPORT: CPT | Mod: 59,,, | Performed by: INTERNAL MEDICINE

## 2017-12-13 PROCEDURE — 84484 ASSAY OF TROPONIN QUANT: CPT | Mod: 91

## 2017-12-13 PROCEDURE — 25000003 PHARM REV CODE 250: Performed by: PSYCHIATRY & NEUROLOGY

## 2017-12-13 PROCEDURE — 83880 ASSAY OF NATRIURETIC PEPTIDE: CPT

## 2017-12-13 PROCEDURE — 80053 COMPREHEN METABOLIC PANEL: CPT

## 2017-12-13 PROCEDURE — 20600001 HC STEP DOWN PRIVATE ROOM

## 2017-12-13 RX ORDER — ACETAMINOPHEN 325 MG/1
650 TABLET ORAL EVERY 6 HOURS PRN
Status: DISCONTINUED | OUTPATIENT
Start: 2017-12-13 | End: 2017-12-14 | Stop reason: HOSPADM

## 2017-12-13 RX ORDER — FUROSEMIDE 10 MG/ML
20 INJECTION INTRAMUSCULAR; INTRAVENOUS ONCE
Status: DISCONTINUED | OUTPATIENT
Start: 2017-12-13 | End: 2017-12-14

## 2017-12-13 RX ORDER — LISINOPRIL 10 MG/1
10 TABLET ORAL DAILY
Status: DISCONTINUED | OUTPATIENT
Start: 2017-12-13 | End: 2017-12-14 | Stop reason: HOSPADM

## 2017-12-13 RX ORDER — CARVEDILOL 6.25 MG/1
6.25 TABLET ORAL 2 TIMES DAILY
Status: DISCONTINUED | OUTPATIENT
Start: 2017-12-13 | End: 2017-12-14 | Stop reason: HOSPADM

## 2017-12-13 RX ORDER — FUROSEMIDE 40 MG/1
40 TABLET ORAL DAILY
Status: CANCELLED | OUTPATIENT
Start: 2017-12-13

## 2017-12-13 RX ADMIN — QUETIAPINE FUMARATE 50 MG: 25 TABLET, FILM COATED ORAL at 09:12

## 2017-12-13 RX ADMIN — CARVEDILOL 6.25 MG: 6.25 TABLET, FILM COATED ORAL at 09:12

## 2017-12-13 RX ADMIN — ACETAMINOPHEN 650 MG: 325 TABLET ORAL at 09:12

## 2017-12-13 RX ADMIN — HEPARIN SODIUM 5000 UNITS: 5000 INJECTION, SOLUTION INTRAVENOUS; SUBCUTANEOUS at 09:12

## 2017-12-13 RX ADMIN — FOLIC ACID 1 MG: 1 TABLET ORAL at 09:12

## 2017-12-13 RX ADMIN — Medication 3 ML: at 01:12

## 2017-12-13 RX ADMIN — ASPIRIN 325 MG ORAL TABLET 325 MG: 325 PILL ORAL at 09:12

## 2017-12-13 RX ADMIN — Medication 100 MG: at 09:12

## 2017-12-13 RX ADMIN — HEPARIN SODIUM 5000 UNITS: 5000 INJECTION, SOLUTION INTRAVENOUS; SUBCUTANEOUS at 05:12

## 2017-12-13 RX ADMIN — STANDARDIZED SENNA CONCENTRATE AND DOCUSATE SODIUM 1 TABLET: 8.6; 5 TABLET, FILM COATED ORAL at 09:12

## 2017-12-13 RX ADMIN — Medication 3 ML: at 05:12

## 2017-12-13 RX ADMIN — HEPARIN SODIUM 5000 UNITS: 5000 INJECTION, SOLUTION INTRAVENOUS; SUBCUTANEOUS at 01:12

## 2017-12-13 RX ADMIN — LISINOPRIL 10 MG: 10 TABLET ORAL at 09:12

## 2017-12-13 RX ADMIN — Medication 3 ML: at 10:12

## 2017-12-13 RX ADMIN — ATORVASTATIN CALCIUM 80 MG: 20 TABLET, FILM COATED ORAL at 09:12

## 2017-12-13 RX ADMIN — CARVEDILOL 3.12 MG: 3.12 TABLET, FILM COATED ORAL at 09:12

## 2017-12-13 RX ADMIN — CLOPIDOGREL 75 MG: 75 TABLET, FILM COATED ORAL at 09:12

## 2017-12-13 NOTE — PT/OT/SLP DISCHARGE
Occupational Therapy Discharge Summary    Jana Stewart  MRN: [id number]   Principal Problem: Embolic stroke involving right middle cerebral artery      Patient Discharged from acute Occupational Therapy on 12/13.  Please refer to prior OT note dated 12/13 for functional status.    Assessment:      Patient appropriate for care in another setting.    Objective:     GOALS:    Occupational Therapy Goals        Problem: Occupational Therapy Goal    Goal Priority Disciplines Outcome Interventions   Occupational Therapy Goal     OT, PT/OT     Description:  Goals set 12/10 to be addressed for 7 days with expiration date, 12/17:  Patient will increase functional independence with ADLs by performing:  Patient will demonstrate stand pivot transfers with modified independence.   Not met  Patient will demonstrate grooming while standing with modified independence.   Not met  Patient will demonstrate upper body dressing with modified independence.   Not met  Patient will demonstrate lower body dressing with modified independence.   Not met  Patient will demonstrate toileting with modified independence.   Not met  Patient will demonstrate bathing while seated EOB with modified independence.   Not met  Patient's family / caregiver will demonstrate independence and safety with assisting patient with self-care skills and functional mobility.     Not met  Patient and/or patient's family will verbalize understanding of stroke prevention guidelines, personal risk factors and stroke warning signs via teachback method.  Not met                           Reasons for Discontinuation of Therapy Services  Transfer to alternate level of care.      Plan:     Patient Discharged to: Home with Home Health Service    LEYDI Bautista  12/13/2017

## 2017-12-13 NOTE — PLAN OF CARE
[facility] cancelled Medicaid transport home. [facility] will reschedule once patient is medically ready for discharge.     Pat Enriquez, JEFFREY  [facility]  Ext. [phone]

## 2017-12-13 NOTE — ASSESSMENT & PLAN NOTE
Mr. Stewart is a 57 y.o. Man with cardiomyopathy EF 10%, CAD s/p PCI x 3 2014 - then [facility] 3/2016 with no remaining obstructive disease (per past notes), as well as ongoing tobacco and drug abuse, now presents with acute MCA stroke s/p intervention in the setting of ongoing cardiovascular disease - new elevated troponin.    -Would trend troponin to ensure downtrending, no need for ACS at this time.  -Would give a few doses of IV diuresis as he might have a few extra liters IVF during admission for CVA  -PET stress test given new troponin elevation  -Increase carvedilol to 6  -Repeat TTE with echo contrast to ensure LV thrombus is not etiology of CVA  -Interrogate ICD to rule out AF as the cause of CVA  -Restart lisinopril when BP tolerates  -Cont ASA/Plavix  -Cont LIpitor

## 2017-12-13 NOTE — ASSESSMENT & PLAN NOTE
56 yo M with PMHx CHF and cocaine abuse transferred for new R MCA syndrome. LKN >4.5 hours when evaluated so no alteplase administered. [place] [facility] patient had CTA MP showing R MCA LVO so he was brought to IR for thrombectomy.     Patient appears to be stable and does not seem to require continuous inpatient medical intervention. Currently will focus on social issues that prevent placement from occurring. Will assist the primary team if needed. No other changes to be made to patient's plan at the moment. Continues to improve physically but will require outpatient SLP therapy      -Antithrombotics for secondary stroke prevention: Antiplatelets:  ASA 81 mg po qd   -Statins for secondary stroke prevention/HLD, if present: Atorvastatin 40 mg po qd (LDL invalid as TGs > 479)  -Aggressive risk factor modification: HLD, Diet, Exercise, drug abuse  -Rehab Efforts: PT/OT/PM&R/SLP  -Diagnostics:     -MRI head without contrast to assess brain parenchyma  VTE Prophylaxis: Heparin 5000 U q8h

## 2017-12-13 NOTE — SUBJECTIVE & OBJECTIVE
Interval History: Since admission Mr. Stewart had interventional thrombectomy for R MCA stroke, with successful reperfusion.  He still has some deficits but these are improving. TTE with known severe cardiomyopathy, follow up labs drawn by primary team showing troponin now 2.6 from 0.08, and BNP also elevated in 4,000's up from 2,000's.  Mr. Stewart denies any complaints of chest pain or shortness of breath and wishes to go home.    Review of Systems   Unable to perform ROS: other (PROGRESS NOTE)     Objective:     Vital Signs (Most Recent):  Temp: 97.7 °F (36.5 °C) (12/13/17 1551)  Pulse: 85 (12/13/17 1551)  Resp: 18 (12/13/17 1551)  BP: 133/85 (12/13/17 1211)  SpO2: 96 % (12/13/17 1551) Vital Signs (24h Range):  Temp:  [97.3 °F (36.3 °C)-99 °F (37.2 °C)] 97.7 °F (36.5 °C)  Pulse:  [] 85  Resp:  [16-31] 18  SpO2:  [79 %-96 %] 96 %  BP: (133-146)/() 133/85     Weight: 70.3 kg (154 lb 15.7 oz)  Body mass index is 23.71 kg/m².     SpO2: 96 %  O2 Device (Oxygen Therapy): room air      Intake/Output Summary (Last 24 hours) at 12/13/17 1622  Last data filed at 12/13/17 0552   Gross per 24 hour   Intake                3 ml   Output             1700 ml   Net            -1697 ml       Lines/Drains/Airways     Peripheral Intravenous Line                 Peripheral IV - Single Lumen 12/09/17 0900 Left Forearm 4 days         Peripheral IV - Single Lumen 12/12/17 0548 Left Antecubital 1 day                Physical Exam   Constitutional: He is oriented to person, place, and time. No distress.   Poor hygiene, tangential in discussion.   HENT:   Head: Normocephalic and atraumatic.   Eyes: EOM are normal. No scleral icterus.   Neck: JVD (lower 1/3 of neck.) present.   Cardiovascular:   Murmur heard.  Pulmonary/Chest: Effort normal and breath sounds normal. No respiratory distress. He has no wheezes. He has no rales.   Abdominal: Soft. He exhibits no distension. There is no tenderness. There is no rebound.    Musculoskeletal: Normal range of motion. He exhibits no edema.   Neurological: He is alert and oriented to person, place, and time.   Skin: Skin is warm and dry. He is not diaphoretic. No erythema.   Psychiatric:   Tangential     Significant Labs:   CMP   Recent Labs  Lab 12/12/17  0458 12/13/17  0450    138   K 4.0 3.6    109   CO2 21* 22*   GLU 99 76   BUN 15 11   CREATININE 1.3 1.3   CALCIUM 8.2* 8.5*   PROT 6.0 6.1   ALBUMIN 2.8* 2.9*   BILITOT 0.6 0.7   ALKPHOS 48* 57   AST 19 36   ALT 10 13   ANIONGAP 6* 7*   ESTGFRAFRICA >60.0 >60.0   EGFRNONAA >60.0 >60.0   , CBC   Recent Labs  Lab 12/12/17  0458 12/13/17  0450   WBC 4.84 7.00   HGB 9.9* 10.0*   HCT 29.4* 29.9*    236    and Troponin   Recent Labs  Lab 12/13/17  1006   TROPONINI 2.671*     Significant Imaging: Echocardiogram:   2D echo with color flow doppler:   Results for orders placed or performed during the hospital encounter of 12/09/17   Echo doppler color flow   Result Value Ref Range    EF 10 (A) 55 - 65    Mitral Valve Regurgitation MODERATE TO SEVERE (A)     Est. PA Systolic Pressure 46.69 (A)     Tricuspid Valve Regurgitation MODERATE (A)

## 2017-12-13 NOTE — ASSESSMENT & PLAN NOTE
-Total cholesterol 356, , HDL 7, LDL invalid 2/2 TGs  -Continue atorvastatin 40 mg po qd  -Consider additional treatment for hypertriglyceridemia, set up with PCP on discharge

## 2017-12-13 NOTE — ASSESSMENT & PLAN NOTE
-2D Echo with EF 10-15%.  12 lead EKG 12/12/17 appears stable from prior.  -BNP > 4k today with CXR showing some interstitial edema.  Will consider furosemide 40 mg x 1.    -Patient will need close follow up with Cardiology, possibly more of a HTS patient.

## 2017-12-13 NOTE — SUBJECTIVE & OBJECTIVE
Neurologic Chief Complaint: R MCA stroke s/p thrombectomy    Subjective:     Interval History: Patient is seen for follow-up neurological assessment and treatment recommendations:   Patient notes SOB/chest pain and seems to be having significant anxiety with pressured speech regarding wanting to get out of the hospital, mentioning his mother on life support and his fears about ending up that way.  CM noted that patient was resting comfortably on his cell phone earlier and seemed to have increased SOB when he noticed she was coming to talk to him.  12 lead EKG seen at bedside appears to be stable from prior.  Will follow up CXR and enzymes and consider furosemide for SOB pending results.    HPI, Past Medical, Family, and Social History remains the same as documented in the initial encounter.     Review of Systems   Constitutional: Negative for chills and fever.   Eyes: Negative for photophobia and visual disturbance.   Respiratory: Negative for cough and shortness of breath.    Cardiovascular: Negative for chest pain and palpitations.   Gastrointestinal: Negative for constipation, diarrhea, nausea and vomiting.   Musculoskeletal: Negative for arthralgias and myalgias.   Skin: Negative for rash and wound.   Neurological: Positive for speech difficulty and weakness.   Hematological: Negative for adenopathy. Does not bruise/bleed easily.   Psychiatric/Behavioral: Positive for agitation and confusion.     Scheduled Meds:   aspirin  325 mg Oral Daily    atorvastatin  80 mg Oral Daily    carvedilol  3.125 mg Oral BID    clopidogrel  75 mg Oral Daily    folic acid  1 mg Oral Daily    heparin (porcine)  5,000 Units Subcutaneous Q8H    senna-docusate 8.6-50 mg  1 tablet Oral BID    sodium chloride 0.9%  3 mL Intravenous Q8H    thiamine  100 mg Oral Daily     Continuous Infusions:     PRN Meds:ondansetron, sodium chloride 0.9%    Objective:     Vital Signs (Most Recent):  Temp: 97.3 °F (36.3 °C) (12/13/17  1211)  Pulse: 81 (12/13/17 1211)  Resp: 18 (12/13/17 1211)  BP: 133/85 (12/13/17 1211)  SpO2: (!) 93 % (12/13/17 1211)  BP Location: Right arm  Vital Signs Range (Last 24H):  Temp:  [97.3 °F (36.3 °C)-99 °F (37.2 °C)]   Pulse:  []   Resp:  [16-31]   BP: (125-146)/()   SpO2:  [79 %-100 %]   BP Location: Right arm    Physical Exam  General:  Well-developed, well-nourished, anxious with RR in 20s during my exam  HEENT:  NCAT, PERRLA, EOMI, oropharyngeal membranes non-erythematous/without exudate  Neck:  Supple, normal ROM without nuchal rigidity.  Resp:  Symmetric expansion, tachypneic with some bibasilar crackles on exam  CVS:  RRR, no m/r/g appreciated.  No LE edema, peripheral pulses 2+ (radial, dorsalis pedis).  GI:  Abd soft, non-distended, non-tender to palpation  Neurological Exam:   Mental Status:  Awake, alert, oriented to self, location, and month/year.  Seems to lack insight into condition.  Anxious, has pressured speech.  Following commands well and otherwise no notable word-finding difficulties, paraphasic errors, or receptive aphasia.  Cranial Nerves:  VFs intact to movement in all quadrants bilaterally.  PERRLA, EOMI.  Facial movement and sensation intact.  Palate raises symmetrically, tongue protrudes midline.  Trapezius 5/5 bilaterally.    Motor:  Normal bulk and tone.  Strength 5/5 RUE/RLE.  L  strength 5/5, L biceps/triceps 4/5.  LLE with some drift.  LLE plantarflexion, dorsiflexion 5/5.  Sensory:  Intact to light touch with some inattention L compared to R.  Vibratory sensation intact BUE/BLE digits.  Reflexes:  Biceps, brachioradialis, patellar, Achilles 2+.  No ankle clonus.  Equivocal toe bilaterally.  Coordination:  FNF, ERIC, HTS intact.  Gait:  Deferred 2/2 fall risk    Laboratory:  CMP:     Recent Labs  Lab 12/13/17  0450   CALCIUM 8.5*   ALBUMIN 2.9*   PROT 6.1      K 3.6   CO2 22*      BUN 11   CREATININE 1.3   ALKPHOS 57   ALT 13   AST 36   BILITOT 0.7     CBC:      Recent Labs  Lab 12/13/17  0450   WBC 7.00   RBC 3.56*   HGB 10.0*   HCT 29.9*      MCV 84   MCH 28.1   MCHC 33.4     Lipid Panel:     Recent Labs  Lab 12/09/17  2329   CHOL 356*   LDLCALC Invalid, Trig>400.0   HDL 7*   TRIG 479*     Hgb A1C:     Recent Labs  Lab 12/09/17  2329   HGBA1C 5.3     TSH:     Recent Labs  Lab 12/09/17  1607   TSH 0.411     Diagnostic Results     Brain Imaging   12/09/17 CT head w/o contrast:  1. Findings suggestive of evolving recent infarct involving a portion of the right MCA territory. No definite evidence of hemorrhage. No midline shift or hydrocephalus.  2. Remote left parietal infarct. Possible small remote left cerebellar infarct.    Vessel Imaging   12/09/17 CTA Stroke Multiphase:  Focal occlusion of the distal M2 segment of the right MCA, with early ischemic changes in the right middle cerebral artery distribution.  There are findings suggestive of a multiphase CTA score of 4.  Greater than 50% stenosis of the right vertebral artery at the V4 segment, with narrow caliber of the remaining vertebral artery and basilar artery.  The left vertebral artery terminates with its supply to the PICA.  Much of supply to posterior circulation comes via posterior communicating arteries.    12/09/17 IR angiogram carotid internal inc arch and cerebral unilateral  1. Successful aspiration thrombectomy of the right middle cerebral artery.  2. TICI 3 of the right middle cerebral artery artery.    Cardiac Imaging   12/11/17 2D Echo w/ CFD:  CONCLUSIONS     1 - Eccentric LVH with severely depressed left ventricular systolic function (EF 10-15%).     2 - Severe left atrial enlargement.     3 - Mildly depressed right ventricular systolic function .     4 - Moderate to severe mitral regurgitation.     5 - Moderate tricuspid regurgitation.     6 - Pulmonary hypertension. The estimated PA systolic pressure is 47 mmHg.     7 - Intermediate central venous pressure.

## 2017-12-13 NOTE — ASSESSMENT & PLAN NOTE
58 yo M with PMHx CHF and cocaine abuse transferred for new R MCA syndrome. LKN >4.5 hours when evaluated so no alteplase administered. Upon arrival to [facility] [place], patient had CTA MP showing R MCA LVO so he was brought to IR for thrombectomy.      -Antithrombotics for secondary stroke prevention: Antiplatelets:  ASA 81 mg po qd   -Statins for secondary stroke prevention/HLD, if present: Atorvastatin 40 mg po qd (LDL invalid as TGs > 479)  -Aggressive risk factor modification: HLD, Diet, Exercise, drug abuse  -Rehab Efforts: PT/OT/PM&R/SLP  -Diagnostics:  Pending--MRI brain w/o contrast  VTE Prophylaxis: Heparin 5000 U q8h

## 2017-12-13 NOTE — ASSESSMENT & PLAN NOTE
--posiitve tox screen on admission  --chest pain on 12/9-10. Resolved, cardiology consulted. Appreciate recs. Suspect cocaine induced but cannot rule out  ACS. troponins trending down.

## 2017-12-13 NOTE — PROGRESS NOTES
Patient arrive to 732 from Neuro ICU with sitter at bedside. [employer] set up and charge nurse notified of pt need for sitter.

## 2017-12-13 NOTE — SUBJECTIVE & OBJECTIVE
"Neurologic Chief Complaint: R MCA stroke s/p thrombectomy    Subjective:     Interval History: Patient is seen for follow-up neurological assessment and treatment recommendations: Patient continues to deny that he has suffered a stroke and says "it's all from my heart troubles".  Wants to go home and keeps stating he lives down the street from the OSH but can go live with his cousin who also lives nearby temporarily. Has improvement in LUE/LLE strength and is moving all extremities spontaneously.       HPI, Past Medical, Family, and Social History remains the same as documented in the initial encounter.     Review of Systems   Constitutional: Negative for chills and fever.   Eyes: Negative for photophobia and visual disturbance.   Respiratory: Negative for cough and shortness of breath.    Cardiovascular: Negative for chest pain and palpitations.   Gastrointestinal: Negative for constipation, diarrhea, nausea and vomiting.   Musculoskeletal: Negative for arthralgias and myalgias.   Skin: Negative for rash and wound.   Neurological: Positive for speech difficulty and weakness.   Hematological: Negative for adenopathy. Does not bruise/bleed easily.   Psychiatric/Behavioral: Positive for agitation and confusion.     Scheduled Meds:   aspirin  325 mg Oral Daily    atorvastatin  80 mg Oral Daily    carvedilol  3.125 mg Oral BID    clopidogrel  75 mg Oral Daily    folic acid  1 mg Oral Daily    heparin (porcine)  5,000 Units Subcutaneous Q8H    QUEtiapine  50 mg Oral Q12H    senna-docusate 8.6-50 mg  1 tablet Oral BID    sodium chloride 0.9%  3 mL Intravenous Q8H    thiamine  100 mg Oral Daily     Continuous Infusions:     PRN Meds:ondansetron, sodium chloride 0.9%    Objective:     Vital Signs (Most Recent):  Temp: 97.7 °F (36.5 °C) (12/12/17 1100)  Pulse: 72 (12/12/17 1400)  Resp: (!) 29 (12/12/17 1400)  BP: 125/89 (12/12/17 1400)  SpO2: 100 % (12/12/17 1400)     Vital Signs Range (Last 24H):  Temp:  [97.6 °F " (36.4 °C)-98.6 °F (37 °C)]   Pulse:  [58-88]   Resp:  [6-33]   BP: (111-141)/(74-97)   SpO2:  [92 %-100 %]        Physical Exam    General:  Well-developed, well-nourished, nad  HEENT:  NCAT, PERRLA, EOMI, oropharyngeal membranes non-erythematous/without exudate  Neck:  Supple, normal ROM without nuchal rigidity.  Resp:  Symmetric expansion, no increased wob  CVS:  No LE edema, peripheral pulses 2+ (radial, dorsalis pedis)  GI:  Abd soft, non-distended, non-tender to palpation  Neurological Exam:   Mental Status:  Awake, alert, oriented to self, location, and month/year.  Seems to lack insight into condition.  Stating he wants to go home, some agitation.  Following commands well.  Cranial Nerves:  VFs intact to movement in all quadrants bilaterally.  PERRLA, EOMI.  Facial movement and sensation intact.  Palate raises symmetrically, tongue protrudes midline.  Trapezius 5/5 bilaterally.    Motor:  Normal bulk and tone.  Strength 5/5 RUE/RLE.  L  strength 5/5, L biceps/triceps 4/5.  LLE with some drift, questionable effort on exam.  LLE plantarflexion and dorsiflexion 5/5.  Sensory:  Intact to light touch with some inattention L compared to R.  Vibratory sensation intact BUE/BLE digits.  Reflexes:  Biceps, brachioradialis, patellar, Achilles 2+.  No ankle clonus.  Equivocal toe bilaterally.  Coordination:  FNF, ERIC, HTS intact.  Gait:  Deferred 2/2 fall risk, tech at bedside for 2D Echo    Laboratory:  CMP:     Recent Labs  Lab 12/12/17  0458   CALCIUM 8.2*   ALBUMIN 2.8*   PROT 6.0      K 4.0   CO2 21*      BUN 15   CREATININE 1.3   ALKPHOS 48*   ALT 10   AST 19   BILITOT 0.6     CBC:     Recent Labs  Lab 12/12/17  0458   WBC 4.84   RBC 3.49*   HGB 9.9*   HCT 29.4*      MCV 84   MCH 28.4   MCHC 33.7     Lipid Panel:     Recent Labs  Lab 12/09/17  2329   CHOL 356*   LDLCALC Invalid, Trig>400.0   HDL 7*   TRIG 479*     Hgb A1C:     Recent Labs  Lab 12/09/17  2329   HGBA1C 5.3     TSH:     Recent  Labs  Lab 12/09/17  1607   TSH 0.411     Diagnostic Results     Brain Imaging   12/09/17 CT head w/o contrast:  1. Findings suggestive of evolving recent infarct involving a portion of the right MCA territory. No definite evidence of hemorrhage. No midline shift or hydrocephalus.  2. Remote left parietal infarct. Possible small remote left cerebellar infarct.    Vessel Imaging   12/09/17 CTA Stroke Multiphase:  Focal occlusion of the distal M2 segment of the right MCA, with early ischemic changes in the right middle cerebral artery distribution.  There are findings suggestive of a multiphase CTA score of 4.  Greater than 50% stenosis of the right vertebral artery at the V4 segment, with narrow caliber of the remaining vertebral artery and basilar artery.  The left vertebral artery terminates with its supply to the PICA.  Much of supply to posterior circulation comes via posterior communicating arteries.    12/09/17 IR angiogram carotid internal inc arch and cerebral unilateral  1. Successful aspiration thrombectomy of the right middle cerebral artery.  2. TICI 3 of the right middle cerebral artery artery.    Cardiac Imaging   12/11/17 2D Echo w/ CFD:  CONCLUSIONS     1 - Eccentric LVH with severely depressed left ventricular systolic function (EF 10-15%).     2 - Severe left atrial enlargement.     3 - Mildly depressed right ventricular systolic function .     4 - Moderate to severe mitral regurgitation.     5 - Moderate tricuspid regurgitation.     6 - Pulmonary hypertension. The estimated PA systolic pressure is 47 mmHg.     7 - Intermediate central venous pressure.

## 2017-12-13 NOTE — PROGRESS NOTES
[facility]  Vascular Neurology  Comprehensive Stroke Center  Progress Note    Assessment/Plan:     * Embolic stroke involving right middle cerebral artery    58 yo M with PMHx CHF and cocaine abuse transferred for new R MCA syndrome. LKN >4.5 hours when evaluated so no alteplase administered. Upon arrival to [facility], patient had CTA MP showing R MCA LVO so he was brought to IR for thrombectomy.     Patient appears to be stable and does not seem to require continuous inpatient medical intervention. Currently will focus on social issues that prevent placement from occurring. Will assist the primary team if needed. No other changes to be made to patient's plan at the moment. Continues to improve physically but will require outpatient SLP therapy      -Antithrombotics for secondary stroke prevention: Antiplatelets:  ASA 81 mg po qd   -Statins for secondary stroke prevention/HLD, if present: Atorvastatin 40 mg po qd (LDL invalid as TGs > 479)  -Aggressive risk factor modification: HLD, Diet, Exercise, drug abuse  -Rehab Efforts: PT/OT/PM&R/SLP  -Diagnostics:     -MRI head without contrast to assess brain parenchyma  VTE Prophylaxis: Heparin 5000 U q8h        Mixed hyperlipidemia    -Total cholesterol 356, , HDL 7, LDL invalid 2/2 TGs  -Continue atorvastatin 40 mg po qd  -Consider additional treatment for hypertriglyceridemia--gemfibrozil         Chronic systolic CHF (congestive heart failure)    -2D Echo with EF 10-15%        Cytotoxic cerebral edema    -Seen on imaging, 2/2 R MCA stroke        Cocaine abuse    -+ cocaine on tox screen at OSH  - Denies during interview              12/09/17:  Admission to Neuro ICU.  IR for thrombectomy due to R MCA occlusion.  12/10/17:  Patient with slight improvement after procedure.  Some sedation during exam, some of weakness documented likely related to sedated state.  12/11/17:  Patient awake with confusion, but coherent speech.  Moving LUE, LLE more today.   "2D Echo showing severely depressed EF (reported by tech at 8%).  12/12/2017 Patient w/ rapid pressured speech. Placement is an issue but patient w/o the need for continuous inpatient placement as he is medically cleared for discharge. Placement per primary team's . Will continue to follow and assist when needed. Will require outpatient SLP therapy       STROKE DOCUMENTATION   Acute Stroke Times   Last Known Normal Date: 12/08/17  Last Known Normal Time:  (unknown LKN - not listed in chart & no family present to confirm)  Symptom Onset Date: 12/09/17  Symptom Onset Time: 0700 (patient found symptomatic at 7am)  Stroke Team Called Date: 12/09/17  Stroke Team Called Time: 1527 (1:30 pm note from telestroke, [place] [facility] [place]  Stroke Team Arrival Date: 12/09/17  Stroke Team Arrival Time: 1528  CT Interpretation Time: 1550  Decision to Treat Time for Alteplase:  (n/a outside window)  Decision to Treat Time for IR: 1550    NIH Scale:          Modified [place] Score: 1  Vinicius Coma Scale:    ABCD2 Score:    [id number] Score:   HAS -BLED Score:   ICH Score:   Hunt & Rosa Classification:      Hemorrhagic change of an Ischemic Stroke: Does this patient have an ischemic stroke with hemorrhagic changes? No     Neurologic Chief Complaint: R MCA stroke s/p thrombectomy    Subjective:     Interval History: Patient is seen for follow-up neurological assessment and treatment recommendations: Patient continues to deny that he has suffered a stroke and says "it's all from my heart troubles".  Wants to go home and keeps stating he lives down the street from the OSH but can go live with his cousin who also lives nearby temporarily. Has improvement in LUE/LLE strength and is moving all extremities spontaneously.       HPI, Past Medical, Family, and Social History remains the same as documented in the initial encounter.     Review of Systems   Constitutional: Negative for chills and fever.   Eyes: Negative for " photophobia and visual disturbance.   Respiratory: Negative for cough and shortness of breath.    Cardiovascular: Negative for chest pain and palpitations.   Gastrointestinal: Negative for constipation, diarrhea, nausea and vomiting.   Musculoskeletal: Negative for arthralgias and myalgias.   Skin: Negative for rash and wound.   Neurological: Positive for speech difficulty and weakness.   Hematological: Negative for adenopathy. Does not bruise/bleed easily.   Psychiatric/Behavioral: Positive for agitation and confusion.     Scheduled Meds:   aspirin  325 mg Oral Daily    atorvastatin  80 mg Oral Daily    carvedilol  3.125 mg Oral BID    clopidogrel  75 mg Oral Daily    folic acid  1 mg Oral Daily    heparin (porcine)  5,000 Units Subcutaneous Q8H    QUEtiapine  50 mg Oral Q12H    senna-docusate 8.6-50 mg  1 tablet Oral BID    sodium chloride 0.9%  3 mL Intravenous Q8H    thiamine  100 mg Oral Daily     Continuous Infusions:     PRN Meds:ondansetron, sodium chloride 0.9%    Objective:     Vital Signs (Most Recent):  Temp: 97.7 °F (36.5 °C) (12/12/17 1100)  Pulse: 72 (12/12/17 1400)  Resp: (!) 29 (12/12/17 1400)  BP: 125/89 (12/12/17 1400)  SpO2: 100 % (12/12/17 1400)     Vital Signs Range (Last 24H):  Temp:  [97.6 °F (36.4 °C)-98.6 °F (37 °C)]   Pulse:  [58-88]   Resp:  [6-33]   BP: (111-141)/(74-97)   SpO2:  [92 %-100 %]        Physical Exam    General:  Well-developed, well-nourished, nad  HEENT:  NCAT, PERRLA, EOMI, oropharyngeal membranes non-erythematous/without exudate  Neck:  Supple, normal ROM without nuchal rigidity.  Resp:  Symmetric expansion, no increased wob  CVS:  No LE edema, peripheral pulses 2+ (radial, dorsalis pedis)  GI:  Abd soft, non-distended, non-tender to palpation  Neurological Exam:   Mental Status:  Awake, alert, oriented to self, location, and month/year.  Seems to lack insight into condition.  Stating he wants to go home, some agitation.  Following commands well.  Cranial  Nerves:  VFs intact to movement in all quadrants bilaterally.  PERRLA, EOMI.  Facial movement and sensation intact.  Palate raises symmetrically, tongue protrudes midline.  Trapezius 5/5 bilaterally.    Motor:  Normal bulk and tone.  Strength 5/5 RUE/RLE.  L  strength 5/5, L biceps/triceps 4/5.  LLE with some drift, questionable effort on exam.  LLE plantarflexion and dorsiflexion 5/5.  Sensory:  Intact to light touch with some inattention L compared to R.  Vibratory sensation intact BUE/BLE digits.  Reflexes:  Biceps, brachioradialis, patellar, Achilles 2+.  No ankle clonus.  Equivocal toe bilaterally.  Coordination:  FNF, ERIC, HTS intact.  Gait:  Deferred 2/2 fall risk, tech at bedside for 2D Echo    Laboratory:  CMP:     Recent Labs  Lab 12/12/17  0458   CALCIUM 8.2*   ALBUMIN 2.8*   PROT 6.0      K 4.0   CO2 21*      BUN 15   CREATININE 1.3   ALKPHOS 48*   ALT 10   AST 19   BILITOT 0.6     CBC:     Recent Labs  Lab 12/12/17  0458   WBC 4.84   RBC 3.49*   HGB 9.9*   HCT 29.4*      MCV 84   MCH 28.4   MCHC 33.7     Lipid Panel:     Recent Labs  Lab 12/09/17  2329   CHOL 356*   LDLCALC Invalid, Trig>400.0   HDL 7*   TRIG 479*     Hgb A1C:     Recent Labs  Lab 12/09/17  2329   HGBA1C 5.3     TSH:     Recent Labs  Lab 12/09/17  1607   TSH 0.411     Diagnostic Results     Brain Imaging   12/09/17 CT head w/o contrast:  1. Findings suggestive of evolving recent infarct involving a portion of the right MCA territory. No definite evidence of hemorrhage. No midline shift or hydrocephalus.  2. Remote left parietal infarct. Possible small remote left cerebellar infarct.    Vessel Imaging   12/09/17 CTA Stroke Multiphase:  Focal occlusion of the distal M2 segment of the right MCA, with early ischemic changes in the right middle cerebral artery distribution.  There are findings suggestive of a multiphase CTA score of 4.  Greater than 50% stenosis of the right vertebral artery at the V4 segment, with  narrow caliber of the remaining vertebral artery and basilar artery.  The left vertebral artery terminates with its supply to the PICA.  Much of supply to posterior circulation comes via posterior communicating arteries.    12/09/17 IR angiogram carotid internal inc arch and cerebral unilateral  1. Successful aspiration thrombectomy of the right middle cerebral artery.  2. TICI 3 of the right middle cerebral artery artery.    Cardiac Imaging   12/11/17 2D Echo w/ CFD:  CONCLUSIONS     1 - Eccentric LVH with severely depressed left ventricular systolic function (EF 10-15%).     2 - Severe left atrial enlargement.     3 - Mildly depressed right ventricular systolic function .     4 - Moderate to severe mitral regurgitation.     5 - Moderate tricuspid regurgitation.     6 - Pulmonary hypertension. The estimated PA systolic pressure is 47 mmHg.     7 - Intermediate central venous pressure.       Hina Hensley MD  Comprehensive Stroke Center  Department of Vascular Neurology   [facility]

## 2017-12-13 NOTE — PLAN OF CARE
Problem: Occupational Therapy Goal  Goal: Occupational Therapy Goal  Goals set 12/10 to be addressed for 7 days with expiration date, 12/17:  Patient will increase functional independence with ADLs by performing:  Patient will demonstrate stand pivot transfers with modified independence.   Not met  Patient will demonstrate grooming while standing with modified independence.   Not met  Patient will demonstrate upper body dressing with modified independence.   Not met  Patient will demonstrate lower body dressing with modified independence.   Not met  Patient will demonstrate toileting with modified independence.   Not met  Patient will demonstrate bathing while seated EOB with modified independence.   Not met  Patient's family / caregiver will demonstrate independence and safety with assisting patient with self-care skills and functional mobility.     Not met  Patient and/or patient's family will verbalize understanding of stroke prevention guidelines, personal risk factors and stroke warning signs via teachback method.  Not met          Goals remain appropriate.  LEYDI Bautista  12/13/2017

## 2017-12-13 NOTE — ASSESSMENT & PLAN NOTE
Hx: prior CVA  R MCA syndrome  S/p thrombectomy R M1, with TICI 3 flow post procedure  Vasc. Neuro following  If no bleed on CT head, will load with plavix 300mg followed by 75mg daily  And ASA 325mg daily  PT/OT/SLP

## 2017-12-13 NOTE — PT/OT/SLP PROGRESS
"Occupational Therapy   Treatment    Name: Jana Stewart  MRN: [id number]  Admitting Diagnosis:  Embolic stroke involving right middle cerebral artery       Recommendations:     Discharge Recommendations: home health OT (with supervision)  Discharge Equipment Recommendations:  none  Barriers to discharge:  None    Subjective   Patient: At first attempt, patient commented:  "I have chest pain."  "I am ready to give up.  I am tired of fighting."  "I live with my cousin."  Communicated with:  Nurse/ prior to session.  Pain/Comfort:  Patient with 10/10 chest pain complaint at first attempt; nurse notified.  Patient not seen at that time (8:28am).  Pain Rating prior to session:         Pain Rating 1: 0/10  · Pain Rating Post-Intervention 1: 0/10    Objective:     Patient found with: telemetry, peripheral IV, pulse ox (continuous), blood pressure cuff  Family not present.  General Precautions: Standard, fall, seizure   Orthopedic Precautions:N/A   Braces: N/A     Occupational Performance:    Bed Mobility:    · Patient completed Rolling/Turning to Left with  modified independence  · Patient completed Rolling/Turning to Right with modified independence  · Patient completed Scooting/Bridging with modified independence  · Patient completed Supine to Sit with modified independence  · Patient completed Sit to Supine with modified independence     Functional Mobility/Transfers:  · Patient completed Sit <> Stand Transfer with supervision  with  no assistive device   · Patient completed Bed <> Chair Transfer using Stand Pivot technique with supervision with no assistive device    Activities of Daily Living:  · Grooming: supervision while standing  · UB Dressing: supervision while standing  · LB Dressing: supervision while standing managing clothing  · Toileting: supervision with use of std commode  Patient with increased weight shift to the left during ADLs, able to regain balance without assistance of another; " patient with decreased awareness of deficit of left sided weakness.    Patient left supine with all lines intact and call button in reach    [facility] 19    Treatment & Education:  Patient education provided on role of OT and need for continued OT upon discharge.  Patient education provided on dressing technique, and transfers. Patient verbalizing understanding via teach back method. Patient instructed on need to call for assistance for toileting needs and when getting up.  Continued education, patient/ family training recommended.  Patient alert and oriented x 3; able to follow 4/4 one step commands.  Patient attentive and interactive throughout the session.  Patient's functional status and disposition recommendation discussed with patient and nurse.  White board updated in patient's room.  OT asked if there were any other questions; patient/ family had no further questions.  Education:    Assessment:     Jana Stewart is a 57 y.o. male with a medical diagnosis of Embolic stroke involving right middle cerebral artery.  He presents with performance deficits affecting function are weakness, impaired sensation, impaired endurance, impaired self care skills, impaired functional mobilty, impaired balance, gait instability.  These performance deficits have resulted in activity limitations including but not limited to: transfers, ascending/ descending stairs, walking short and long distances, walking around obstacles, transitional movement patterns (kneeling, bending); eating, upper body dressing, lower body dressing, brushing teeth, toileting, and writing.   Patient's role as father, cousin and independent caretaker for self has been affected. Patient will benefit from skilled OT services to maximize level of independence with self-care skills and functional mobility.  Patient with left sided weakness and decreased awareness of deficit.  Will benefit from HH OT and supervision at home.        Rehab Prognosis:  Good; patient would benefit from acute skilled OT services to address these deficits and reach maximum level of function.       Plan:     Patient to be seen 4 x/week to address the above listed problems via self-care/home management, therapeutic activities, therapeutic exercises, neuromuscular re-education, cognitive retraining  · Plan of Care Expires: 01/07/18  · Plan of Care Reviewed with: patient    This Plan of care has been discussed with the patient who was involved in its development and understands and is in agreement with the identified goals and treatment plan    GOALS:    Occupational Therapy Goals        Problem: Occupational Therapy Goal    Goal Priority Disciplines Outcome Interventions   Occupational Therapy Goal     OT, PT/OT     Description:  Goals set 12/10 to be addressed for 7 days with expiration date, 12/17:  Patient will increase functional independence with ADLs by performing:  Patient will demonstrate stand pivot transfers with modified independence.   Not met  Patient will demonstrate grooming while standing with modified independence.   Not met  Patient will demonstrate upper body dressing with modified independence.   Not met  Patient will demonstrate lower body dressing with modified independence.   Not met  Patient will demonstrate toileting with modified independence.   Not met  Patient will demonstrate bathing while seated EOB with modified independence.   Not met  Patient's family / caregiver will demonstrate independence and safety with assisting patient with self-care skills and functional mobility.     Not met  Patient and/or patient's family will verbalize understanding of stroke prevention guidelines, personal risk factors and stroke warning signs via teachback method.  Not met                           Time Tracking:     OT Date of Treatment: 12/13/17  OT Start Time: 1111  OT Stop Time: 1134  OT Total Time (min): 23 min    Billable Minutes:Self Care/Home  Management 23    LEYDI Bautista  12/13/2017

## 2017-12-13 NOTE — PROGRESS NOTES
[facility]  Cardiology  Progress Note    Patient Name: Jana Stewart  MRN: [id number]  Admission Date: 12/9/2017  Hospital Length of Stay: 4 days  Code Status: Full Code   Attending Physician: Mina Vernon MD   Primary Care Physician: Paul Gutierres MD  Expected Discharge Date:   Principal Problem:Acute on chronic combined systolic and diastolic congestive heart failure    Subjective:     Hospital Course:   No notes on file    Interval History: Since admission Mr. Stewart had interventional thrombectomy for R MCA stroke, with successful reperfusion.  He still has some deficits but these are improving. TTE with known severe cardiomyopathy, follow up labs drawn by primary team showing troponin now 2.6 from 0.08, and BNP also elevated in 4,000's up from 2,000's.  Mr. Stewart denies any complaints of chest pain or shortness of breath and wishes to go home.    Review of Systems   Unable to perform ROS: other (PROGRESS NOTE)     Objective:     Vital Signs (Most Recent):  Temp: 97.7 °F (36.5 °C) (12/13/17 1551)  Pulse: 85 (12/13/17 1551)  Resp: 18 (12/13/17 1551)  BP: 133/85 (12/13/17 1211)  SpO2: 96 % (12/13/17 1551) Vital Signs (24h Range):  Temp:  [97.3 °F (36.3 °C)-99 °F (37.2 °C)] 97.7 °F (36.5 °C)  Pulse:  [] 85  Resp:  [16-31] 18  SpO2:  [79 %-96 %] 96 %  BP: (133-146)/() 133/85     Weight: 70.3 kg (154 lb 15.7 oz)  Body mass index is 23.71 kg/m².     SpO2: 96 %  O2 Device (Oxygen Therapy): room air      Intake/Output Summary (Last 24 hours) at 12/13/17 1622  Last data filed at 12/13/17 0552   Gross per 24 hour   Intake                3 ml   Output             1700 ml   Net            -1697 ml       Lines/Drains/Airways     Peripheral Intravenous Line                 Peripheral IV - Single Lumen 12/09/17 0900 Left Forearm 4 days         Peripheral IV - Single Lumen 12/12/17 0548 Left Antecubital 1 day                Physical Exam   Constitutional: He is oriented to person,  place, and time. No distress.   Poor hygiene, tangential in discussion.   HENT:   Head: Normocephalic and atraumatic.   Eyes: EOM are normal. No scleral icterus.   Neck: JVD (lower 1/3 of neck.) present.   Cardiovascular:   Murmur heard.  Pulmonary/Chest: Effort normal and breath sounds normal. No respiratory distress. He has no wheezes. He has no rales.   Abdominal: Soft. He exhibits no distension. There is no tenderness. There is no rebound.   Musculoskeletal: Normal range of motion. He exhibits no edema.   Neurological: He is alert and oriented to person, place, and time.   Skin: Skin is warm and dry. He is not diaphoretic. No erythema.   Psychiatric:   Tangential     Significant Labs:   CMP   Recent Labs  Lab 12/12/17  0458 12/13/17  0450    138   K 4.0 3.6    109   CO2 21* 22*   GLU 99 76   BUN 15 11   CREATININE 1.3 1.3   CALCIUM 8.2* 8.5*   PROT 6.0 6.1   ALBUMIN 2.8* 2.9*   BILITOT 0.6 0.7   ALKPHOS 48* 57   AST 19 36   ALT 10 13   ANIONGAP 6* 7*   ESTGFRAFRICA >60.0 >60.0   EGFRNONAA >60.0 >60.0   , CBC   Recent Labs  Lab 12/12/17 0458 12/13/17  0450   WBC 4.84 7.00   HGB 9.9* 10.0*   HCT 29.4* 29.9*    236    and Troponin   Recent Labs  Lab 12/13/17  1006   TROPONINI 2.671*     Significant Imaging: Echocardiogram:   2D echo with color flow doppler:   Results for orders placed or performed during the hospital encounter of 12/09/17   Echo doppler color flow   Result Value Ref Range    EF 10 (A) 55 - 65    Mitral Valve Regurgitation MODERATE TO SEVERE (A)     Est. PA Systolic Pressure 46.69 (A)     Tricuspid Valve Regurgitation MODERATE (A)      Assessment and Plan:     Brief HPI: as below    * Acute on chronic combined systolic and diastolic congestive heart failure    Mr. Stewart is a 57 y.o. Man with cardiomyopathy EF 10%, CAD s/p PCI x 3 2014 - then [facility] 3/2016 with no remaining obstructive disease (per past notes), as well as ongoing tobacco and drug abuse, now presents with acute MCA  stroke s/p intervention in the setting of ongoing cardiovascular disease - new elevated troponin.    -Would trend troponin to ensure downtrending, no need for ACS at this time.  -Would give a few doses of IV diuresis as he might have a few extra liters IVF during admission for CVA  -PET stress test given new troponin elevation  -Increase carvedilol to 6  -Repeat TTE with echo contrast to ensure LV thrombus is not etiology of CVA  -Interrogate ICD to rule out AF as the cause of CVA  -Restart lisinopril when BP tolerates  -Cont ASA/Plavix  -Cont LIpitor            VTE Risk Mitigation         Ordered     heparin (porcine) injection 5,000 Units  Every 8 hours     Route:  Subcutaneous        12/11/17 1017     Medium Risk of VTE  Once      12/09/17 1911     Reason for No Pharmacological VTE Prophylaxis  Once      12/09/17 1911     Place sequential compression device  Until discontinued      12/09/17 1911          Raphael Fishman MD  Cardiology  [facility]

## 2017-12-13 NOTE — PROGRESS NOTES
[facility]  Neurocritical Care  Progress Note    Admit Date: 12/9/2017  Service Date: 12/12/2017  Length of Stay: 3    Subjective:     Chief Complaint: Embolic stroke involving right middle cerebral artery    History of Present Illness: The patient is a 57 yr old AAM with a history of CVA, HTN, CAD, CHF, dyslipidemia, cardiomyopathy, pacemaker, drug (cocaine, marijuana), tobacco and ETOH use. History and record of events obtained from chart as patient is still confused and disoriented post IR procedure.     The patient  developed chest pain this morning and went to OSH where he was admitted and being worked up for an MI.  This morning he fell getting up out of bed, at OSH. He developed left facial droop, left UE/LE weakness, became agitated and had non sensical speech. He was sent for CT of head which showed multiple areas of ischemic change. A telestroke consult was obtained and the neurologist [place] [facility] [place].  His last known normal was 41/2 hours prior to [place] [facility] [place] Urine tox screen was positive for marijuana and cocaine.  On CTA multiphase a right M1 occlusion was found. He was taken to IR for successful thrombectomy RMCA M1 occlusion, with TICI 3 post procedure. He is being admitted to NICU s/p thrombectomy.    Hospital Course: 12/9 admit to NICU s/p thrombectomy RMCA M1, with TICI 3 post procedure. Will Keep SBP <140 for 24h. CT head pending this evening    12/10- chest pain, resolved spontaneosly. troponins trending down - cardiology consulted, suspect cocaine-induced vs noncardiac, cannot rule out atheroslerotic disease given cocaine abuse      12/11- no acute events overnight, off precedex , awaiting echo and cxr to determine need for diuresis. No focal deficits on exam, however sometimes needs vocal stimulant to open eyes- lethargy vs poor cooperation. If continues , may get follow up CT as he is likely within peak swelling  period.    INTERVAL 12/12- off precedex, doing well with seroquel and stable for ttf. EKG ordered for tomorrow to eval QTc. Continuing aspirin , plavix . Repeat  Echo EF 10. Patient seen by cardiology who is following.          ROS negative     Vitals:   Temp: 98.9 °F (37.2 °C)  Pulse: 89  Rhythm: normal sinus rhythm  BP: (!) 137/102  MAP (mmHg): 113  Resp: 16  SpO2: (!) 93 %  O2 Device (Oxygen Therapy): room air    Temp  Min: 97.6 °F (36.4 °C)  Max: 98.9 °F (37.2 °C)  Pulse  Min: 58  Max: 93  BP  Min: 111/74  Max: 137/102  MAP (mmHg)  Min: 72  Max: 113  Resp  Min: 6  Max: 33  SpO2  Min: 79 %  Max: 100 %    12/11 0701 - 12/12 0700  In: 2178.9 [I.V.:2178.9]  Out: 400 [Urine:400]   Unmeasured Output  Urine Occurrence: 1  Stool Occurrence: 0  Pad Count: 2         Medications:   Continuous Scheduled  aspirin 325 mg Daily   atorvastatin 80 mg Daily   carvedilol 3.125 mg BID   clopidogrel 75 mg Daily   folic acid 1 mg Daily   heparin (porcine) 5,000 Units Q8H   QUEtiapine 50 mg Q12H   senna-docusate 8.6-50 mg 1 tablet BID   sodium chloride 0.9% 3 mL Q8H   thiamine 100 mg Daily   PRN  ondansetron 4 mg Q8H PRN   sodium chloride 0.9% 5 mL PRN      Today I independently reviewed pertinent medications, lines/drains/airways, imaging, cardiology, lab results, notably: Echo, labs, EKG  Assessment/Plan:     Neuro   * Embolic stroke involving right middle cerebral artery    Hx: prior CVA  R MCA syndrome  S/p thrombectomy R M1, with TICI 3 flow post procedure  Vasc. Neuro following  If no bleed on CT head, will load with plavix 300mg followed by 75mg daily  And ASA 325mg daily  PT/OT/SLP                Cytotoxic cerebral edema    --see stroke        Psychiatric   Cocaine abuse    --posiitve tox screen on admission  --chest pain on 12/9-10. Resolved, cardiology consulted. Appreciate recs. Suspect cocaine induced but cannot rule out  ACS. troponins trending down.         Anxiety    On xanax 1mg bid at home        Cardiac/Vascular    Chest pain due to myocardial ischemia    --ef 10 in 2016  --follow up echo, CXR  --no resp distress, discuss starting diuretic         Endocrine   Malnutrition    --nutriition consult        Other   Chest pain    Hx CAD, CHF, PM  ECG pending  Echo pending  Trend serial troponin            Prophylaxis:  Venous Thromboembolism: mechanical chemical  Stress Ulcer: None  Ventilator Pneumonia: not applicable     Activity Orders          None        Full Code    Karen Jacobson PA-C  Neurocritical Care  [facility]

## 2017-12-13 NOTE — PLAN OF CARE
12:25 PM  [facility] arranged [employer] transportation to bring patient home. Transport is scheduled for 4:00 PM. Transport will contact the nursing unit when they arrive to have patient brought to the front of the hospital. Transport confirmation number is [id number].    Pat Enriquez LMSW  [facility]  Ext. [phone]

## 2017-12-13 NOTE — PROGRESS NOTES
[facility]  Vascular Neurology  Comprehensive Stroke Center  Progress Note    Assessment/Plan:     * Embolic stroke involving right middle cerebral artery    58 yo M with PMHx CHF and cocaine abuse transferred for new R MCA syndrome. LKN >4.5 hours when evaluated so no alteplase administered. [place] [facility] [place] CT[place] MP showing R MCA LVO so he was brought to IR for thrombectomy.      -Antithrombotics for secondary stroke prevention: Antiplatelets:  ASA 81 mg po qd   -Statins for secondary stroke prevention/HLD, if present: Atorvastatin 40 mg po qd (LDL invalid as TGs > 479)  -Aggressive risk factor modification: HLD, Diet, Exercise, drug abuse  -Rehab Efforts: PT/OT/PM&R/SLP--dispo pending placement or pt able to go home with 24 hr supervision  -Diagnostics:  Pending--MRI brain w/o contrast  VTE Prophylaxis: Heparin 5000 U q8h      Cytotoxic cerebral edema    -Seen on imaging, 2/2 R MCA stroke      Mixed hyperlipidemia    -Total cholesterol 356, , HDL 7, LDL invalid 2/2 TGs  -Continue atorvastatin 40 mg po qd  -Consider additional treatment for hypertriglyceridemia, set up with PCP on discharge      Chronic systolic CHF (congestive heart failure)    -2D Echo with EF 10-15%.  12 lead EKG 12/12/17 appears stable from prior.  -BNP > 4k today with CXR showing some interstitial edema.  Will consider furosemide 40 mg x 1.    -Cardiology consulted due to chest pain with elevated cardiac enzymes.      Cocaine abuse    -+ cocaine on tox screen at OSH  -Chest pain today--BNP, troponin, CKMB elevated.  Cardiology consulted, recs pending.         12/09/17:  Admission to Neuro ICU.  IR for thrombectomy due to R MCA occlusion.  12/10/17:  Patient with slight improvement after procedure.  Some sedation during exam, some of weakness documented likely related to sedated state.  12/11/17:  Patient awake with confusion, but coherent speech.  Moving LUE, LLE more today.  2D Echo showing severely  depressed EF (reported by tech at 8%).  12/12/2017 Patient w/ rapid pressured speech. Placement is an issue but patient w/o the need for continuous inpatient placement as he is medically cleared for discharge. Placement per primary team's . Will continue to follow and assist when needed. Will require outpatient SLP therapy   12/13/17:  Pt with continued chest pain.  Noted in previous notes by Neuro ICU.  Appears SOB.  CXR this am with interstitial edema, but not significantly worse than prior.  Patient's exam/symptoms waxing/waning.  Will check BNP, cardiac enzymes, 12 lead EKG to ensure proper work up.  Likely severe CHF at patient's baseline.    STROKE DOCUMENTATION   Acute Stroke Times   Last Known Normal Date: 12/08/17  Last Known Normal Time:  (unknown LKN - not listed in chart & no family present to confirm)  Symptom Onset Date: 12/09/17  Symptom Onset Time: 0700 (patient found symptomatic at 7am)  Stroke Team Called Date: 12/09/17  Stroke Team Called Time: 1527 (1:30 pm note from telestroke, pt transferred to [facility] [place])  Stroke Team Arrival Date: 12/09/17  Stroke Team Arrival Time: 1528  CT Interpretation Time: 1550  Decision to Treat Time for Alteplase:  (n/a outside window)  Decision to Treat Time for IR: 1550    NIH Scale:  1a. Level Of Consciousness: 0-->Alert: keenly responsive  1b. LOC Questions: 0-->Answers both questions correctly  1c. LOC Commands: 0-->Performs both tasks correctly  2. Best Gaze: 0-->Normal  3. Visual: 0-->No visual loss  4. Facial Palsy: 0-->Normal symmetrical movements  5a. Motor Arm, Left: 0-->No drift: limb holds 90 (or 45) degrees for full 10 secs  5b. Motor Arm, Right: 0-->No drift: limb holds 90 (or 45) degrees for full 10 secs  6a. Motor Leg, Left: 1-->Drift: leg falls by the end of the 5-sec period but does not hit bed  6b. Motor Leg, Right: 0-->No drift: leg holds 30 degree position for full 5 secs  7. Limb Ataxia: 0-->Absent  8. Sensory: 0-->Normal: no sensory  loss  9. Best Language: 1-->Mild-to-moderate aphasia: some obvious loss of fluency or facility of comprehension, without significant limitation on ideas expressed or form of expression. Reduction of speech and/or comprehension, however, makes conversation. . . (see row details)  10. Dysarthria: 0-->Normal  11. Extinction and Inattention (formerly Neglect): 1-->Visual, tactile, auditory, spatial, or personal inattention or extinction to bilateral simultaneous stimulation in one of the sensory modalities  Total (NIH Stroke Scale): 3     Modified Sunitha Score: 1  Vinicius Coma Scale:14   ABCD2 Score:    [id number] Score:   HAS -BLED Score:   ICH Score:   Hunt & Rosa Classification:      Hemorrhagic change of an Ischemic Stroke: Does this patient have an ischemic stroke with hemorrhagic changes? No     Neurologic Chief Complaint: R MCA stroke s/p thrombectomy    Subjective:     Interval History: Patient is seen for follow-up neurological assessment and treatment recommendations:   Patient notes SOB/chest pain and seems to be having significant anxiety with pressured speech regarding wanting to get out of the hospital, mentioning his mother on life support and his fears about ending up that way.  CM noted that patient was resting comfortably on his cell phone earlier and seemed to have increased SOB when he noticed she was coming to talk to him.  12 lead EKG seen at bedside appears to be stable from prior.  Will follow up CXR and enzymes and consider furosemide for SOB pending results.    HPI, Past Medical, Family, and Social History remains the same as documented in the initial encounter.     Review of Systems   Constitutional: Negative for chills and fever.   Eyes: Negative for photophobia and visual disturbance.   Respiratory: Negative for cough and shortness of breath.    Cardiovascular: Negative for chest pain and palpitations.   Gastrointestinal: Negative for constipation, diarrhea, nausea and vomiting.    Musculoskeletal: Negative for arthralgias and myalgias.   Skin: Negative for rash and wound.   Neurological: Positive for speech difficulty and weakness.   Hematological: Negative for adenopathy. Does not bruise/bleed easily.   Psychiatric/Behavioral: Positive for agitation and confusion.     Scheduled Meds:   aspirin  325 mg Oral Daily    atorvastatin  80 mg Oral Daily    carvedilol  3.125 mg Oral BID    clopidogrel  75 mg Oral Daily    folic acid  1 mg Oral Daily    heparin (porcine)  5,000 Units Subcutaneous Q8H    senna-docusate 8.6-50 mg  1 tablet Oral BID    sodium chloride 0.9%  3 mL Intravenous Q8H    thiamine  100 mg Oral Daily     Continuous Infusions:     PRN Meds:ondansetron, sodium chloride 0.9%    Objective:     Vital Signs (Most Recent):  Temp: 97.3 °F (36.3 °C) (12/13/17 1211)  Pulse: 81 (12/13/17 1211)  Resp: 18 (12/13/17 1211)  BP: 133/85 (12/13/17 1211)  SpO2: (!) 93 % (12/13/17 1211)  BP Location: Right arm  Vital Signs Range (Last 24H):  Temp:  [97.3 °F (36.3 °C)-99 °F (37.2 °C)]   Pulse:  []   Resp:  [16-31]   BP: (125-146)/()   SpO2:  [79 %-100 %]   BP Location: Right arm    Physical Exam  General:  Well-developed, well-nourished, anxious with RR in 20s during my exam  HEENT:  NCAT, PERRLA, EOMI, oropharyngeal membranes non-erythematous/without exudate  Neck:  Supple, normal ROM without nuchal rigidity.  Resp:  Symmetric expansion, tachypneic with some bibasilar crackles on exam  CVS:  RRR, no m/r/g appreciated.  No LE edema, peripheral pulses 2+ (radial, dorsalis pedis).  GI:  Abd soft, non-distended, non-tender to palpation  Neurological Exam:   Mental Status:  Awake, alert, oriented to self, location, and month/year.  Seems to lack insight into condition.  Anxious, has pressured speech.  Following commands well and otherwise no notable word-finding difficulties, paraphasic errors, or receptive aphasia.  Cranial Nerves:  VFs intact to movement in all quadrants  bilaterally.  PERRLA, EOMI.  Facial movement and sensation intact.  Palate raises symmetrically, tongue protrudes midline.  Trapezius 5/5 bilaterally.    Motor:  Normal bulk and tone.  Strength 5/5 RUE/RLE.  L  strength 5/5, L biceps/triceps 4/5.  LLE with some drift.  LLE plantarflexion, dorsiflexion 5/5.  Sensory:  Intact to light touch with some inattention L compared to R.  Vibratory sensation intact BUE/BLE digits.  Reflexes:  Biceps, brachioradialis, patellar, Achilles 2+.  No ankle clonus.  Equivocal toe bilaterally.  Coordination:  FNF, ERIC, HTS intact.  Gait:  Deferred 2/2 fall risk    Laboratory:  CMP:     Recent Labs  Lab 12/13/17  0450   CALCIUM 8.5*   ALBUMIN 2.9*   PROT 6.1      K 3.6   CO2 22*      BUN 11   CREATININE 1.3   ALKPHOS 57   ALT 13   AST 36   BILITOT 0.7     CBC:     Recent Labs  Lab 12/13/17  0450   WBC 7.00   RBC 3.56*   HGB 10.0*   HCT 29.9*      MCV 84   MCH 28.1   MCHC 33.4     Lipid Panel:     Recent Labs  Lab 12/09/17  2329   CHOL 356*   LDLCALC Invalid, Trig>400.0   HDL 7*   TRIG 479*     Hgb A1C:     Recent Labs  Lab 12/09/17  2329   HGBA1C 5.3     TSH:     Recent Labs  Lab 12/09/17  1607   TSH 0.411     Diagnostic Results     Brain Imaging   12/09/17 CT head w/o contrast:  1. Findings suggestive of evolving recent infarct involving a portion of the right MCA territory. No definite evidence of hemorrhage. No midline shift or hydrocephalus.  2. Remote left parietal infarct. Possible small remote left cerebellar infarct.    Vessel Imaging   12/09/17 CTA Stroke Multiphase:  Focal occlusion of the distal M2 segment of the right MCA, with early ischemic changes in the right middle cerebral artery distribution.  There are findings suggestive of a multiphase CTA score of 4.  Greater than 50% stenosis of the right vertebral artery at the V4 segment, with narrow caliber of the remaining vertebral artery and basilar artery.  The left vertebral artery terminates with  its supply to the PICA.  Much of supply to posterior circulation comes via posterior communicating arteries.    12/09/17 IR angiogram carotid internal inc arch and cerebral unilateral  1. Successful aspiration thrombectomy of the right middle cerebral artery.  2. TICI 3 of the right middle cerebral artery artery.    Cardiac Imaging   12/11/17 2D Echo w/ CFD:  CONCLUSIONS     1 - Eccentric LVH with severely depressed left ventricular systolic function (EF 10-15%).     2 - Severe left atrial enlargement.     3 - Mildly depressed right ventricular systolic function .     4 - Moderate to severe mitral regurgitation.     5 - Moderate tricuspid regurgitation.     6 - Pulmonary hypertension. The estimated PA systolic pressure is 47 mmHg.     7 - Intermediate central venous pressure.     Bettie Tsai MD  Comprehensive Stroke Center  Department of Vascular Neurology   [facility]

## 2017-12-14 ENCOUNTER — CLINICAL SUPPORT (OUTPATIENT)
Dept: CARDIOLOGY | Facility: CLINIC | Age: 57
DRG: 023 | End: 2017-12-14
Attending: EMERGENCY MEDICINE
Payer: MEDICAID

## 2017-12-14 VITALS
BODY MASS INDEX: 23.49 KG/M2 | HEART RATE: 99 BPM | SYSTOLIC BLOOD PRESSURE: 130 MMHG | RESPIRATION RATE: 17 BRPM | WEIGHT: 155 LBS | DIASTOLIC BLOOD PRESSURE: 82 MMHG | OXYGEN SATURATION: 98 % | TEMPERATURE: 98 F | HEIGHT: 68 IN

## 2017-12-14 LAB
ALBUMIN SERPL BCP-MCNC: 3.3 G/DL
ALP SERPL-CCNC: 59 U/L
ALT SERPL W/O P-5'-P-CCNC: 12 U/L
ANION GAP SERPL CALC-SCNC: 8 MMOL/L
ANISOCYTOSIS BLD QL SMEAR: SLIGHT
AST SERPL-CCNC: 34 U/L
BASOPHILS # BLD AUTO: 0.03 K/UL
BASOPHILS NFR BLD: 0.3 %
BILIRUB SERPL-MCNC: 1.2 MG/DL
BUN SERPL-MCNC: 11 MG/DL
CALCIUM SERPL-MCNC: 9.2 MG/DL
CHLORIDE SERPL-SCNC: 107 MMOL/L
CO2 SERPL-SCNC: 24 MMOL/L
CREAT SERPL-MCNC: 1.5 MG/DL
DIASTOLIC DYSFUNCTION: YES
DIFFERENTIAL METHOD: ABNORMAL
EOSINOPHIL # BLD AUTO: 0 K/UL
EOSINOPHIL NFR BLD: 0.1 %
ERYTHROCYTE [DISTWIDTH] IN BLOOD BY AUTOMATED COUNT: 23.5 %
EST. GFR  (AFRICAN AMERICAN): 59.3 ML/MIN/1.73 M^2
EST. GFR  (NON AFRICAN AMERICAN): 51.3 ML/MIN/1.73 M^2
GIANT PLATELETS BLD QL SMEAR: PRESENT
GLUCOSE SERPL-MCNC: 97 MG/DL
HCT VFR BLD AUTO: 36.3 %
HGB BLD-MCNC: 12.2 G/DL
HYPOCHROMIA BLD QL SMEAR: ABNORMAL
IMM GRANULOCYTES # BLD AUTO: 0.03 K/UL
IMM GRANULOCYTES NFR BLD AUTO: 0.3 %
LYMPHOCYTES # BLD AUTO: 1 K/UL
LYMPHOCYTES NFR BLD: 11.1 %
MAGNESIUM SERPL-MCNC: 1.4 MG/DL
MCH RBC QN AUTO: 28.2 PG
MCHC RBC AUTO-ENTMCNC: 33.6 G/DL
MCV RBC AUTO: 84 FL
MONOCYTES # BLD AUTO: 0.9 K/UL
MONOCYTES NFR BLD: 10.3 %
NEUTROPHILS # BLD AUTO: 7 K/UL
NEUTROPHILS NFR BLD: 77.9 %
NRBC BLD-RTO: 0 /100 WBC
PHOSPHATE SERPL-MCNC: 3 MG/DL
PLATELET # BLD AUTO: 267 K/UL
PLATELET BLD QL SMEAR: ABNORMAL
PMV BLD AUTO: 10 FL
POIKILOCYTOSIS BLD QL SMEAR: SLIGHT
POTASSIUM SERPL-SCNC: 3.8 MMOL/L
PROT SERPL-MCNC: 7.3 G/DL
RBC # BLD AUTO: 4.33 M/UL
RETIRED EF AND QEF - SEE NOTES: 10 (ref 55–65)
SODIUM SERPL-SCNC: 139 MMOL/L
TARGETS BLD QL SMEAR: ABNORMAL
TROPONIN I SERPL DL<=0.01 NG/ML-MCNC: 2.21 NG/ML
WBC # BLD AUTO: 9.01 K/UL

## 2017-12-14 PROCEDURE — 83735 ASSAY OF MAGNESIUM: CPT

## 2017-12-14 PROCEDURE — 25000003 PHARM REV CODE 250: Performed by: PHYSICIAN ASSISTANT

## 2017-12-14 PROCEDURE — 84484 ASSAY OF TROPONIN QUANT: CPT

## 2017-12-14 PROCEDURE — 25000003 PHARM REV CODE 250: Performed by: NURSE PRACTITIONER

## 2017-12-14 PROCEDURE — 84100 ASSAY OF PHOSPHORUS: CPT

## 2017-12-14 PROCEDURE — 99233 SBSQ HOSP IP/OBS HIGH 50: CPT | Mod: ,,, | Performed by: PSYCHIATRY & NEUROLOGY

## 2017-12-14 PROCEDURE — 63600175 PHARM REV CODE 636 W HCPCS: Performed by: STUDENT IN AN ORGANIZED HEALTH CARE EDUCATION/TRAINING PROGRAM

## 2017-12-14 PROCEDURE — 78492 MYOCRD IMG PET MLT RST&STRS: CPT | Mod: 26,,, | Performed by: INTERNAL MEDICINE

## 2017-12-14 PROCEDURE — A4216 STERILE WATER/SALINE, 10 ML: HCPCS | Performed by: NURSE PRACTITIONER

## 2017-12-14 PROCEDURE — 80053 COMPREHEN METABOLIC PANEL: CPT

## 2017-12-14 PROCEDURE — 36415 COLL VENOUS BLD VENIPUNCTURE: CPT

## 2017-12-14 PROCEDURE — C8923 2D TTE W OR W/O FOL W/CON,CO: HCPCS

## 2017-12-14 PROCEDURE — 93018 CV STRESS TEST I&R ONLY: CPT | Mod: ,,, | Performed by: INTERNAL MEDICINE

## 2017-12-14 PROCEDURE — 97535 SELF CARE MNGMENT TRAINING: CPT

## 2017-12-14 PROCEDURE — 63600175 PHARM REV CODE 636 W HCPCS: Performed by: PHYSICIAN ASSISTANT

## 2017-12-14 PROCEDURE — 93307 TTE W/O DOPPLER COMPLETE: CPT | Mod: 26,,, | Performed by: INTERNAL MEDICINE

## 2017-12-14 PROCEDURE — 93016 CV STRESS TEST SUPVJ ONLY: CPT | Mod: ,,, | Performed by: INTERNAL MEDICINE

## 2017-12-14 PROCEDURE — 97803 MED NUTRITION INDIV SUBSEQ: CPT

## 2017-12-14 PROCEDURE — 85025 COMPLETE CBC W/AUTO DIFF WBC: CPT

## 2017-12-14 RX ORDER — MAGNESIUM SULFATE HEPTAHYDRATE 40 MG/ML
2 INJECTION, SOLUTION INTRAVENOUS ONCE
Status: COMPLETED | OUTPATIENT
Start: 2017-12-14 | End: 2017-12-14

## 2017-12-14 RX ORDER — FUROSEMIDE 10 MG/ML
40 INJECTION INTRAMUSCULAR; INTRAVENOUS ONCE
Status: DISCONTINUED | OUTPATIENT
Start: 2017-12-14 | End: 2017-12-14 | Stop reason: HOSPADM

## 2017-12-14 RX ORDER — FOLIC ACID 1 MG/1
1 TABLET ORAL DAILY
Qty: 360 TABLET | Refills: 0 | Status: SHIPPED | OUTPATIENT
Start: 2017-12-15 | End: 2018-12-15

## 2017-12-14 RX ADMIN — Medication 100 MG: at 12:12

## 2017-12-14 RX ADMIN — Medication 10 ML: at 01:12

## 2017-12-14 RX ADMIN — CLOPIDOGREL 75 MG: 75 TABLET, FILM COATED ORAL at 12:12

## 2017-12-14 RX ADMIN — ATORVASTATIN CALCIUM 80 MG: 20 TABLET, FILM COATED ORAL at 12:12

## 2017-12-14 RX ADMIN — ASPIRIN 325 MG ORAL TABLET 325 MG: 325 PILL ORAL at 12:12

## 2017-12-14 RX ADMIN — HEPARIN SODIUM 5000 UNITS: 5000 INJECTION, SOLUTION INTRAVENOUS; SUBCUTANEOUS at 01:12

## 2017-12-14 RX ADMIN — LISINOPRIL 10 MG: 10 TABLET ORAL at 12:12

## 2017-12-14 RX ADMIN — STANDARDIZED SENNA CONCENTRATE AND DOCUSATE SODIUM 1 TABLET: 8.6; 5 TABLET, FILM COATED ORAL at 12:12

## 2017-12-14 RX ADMIN — FOLIC ACID 1 MG: 1 TABLET ORAL at 12:12

## 2017-12-14 RX ADMIN — HEPARIN SODIUM 5000 UNITS: 5000 INJECTION, SOLUTION INTRAVENOUS; SUBCUTANEOUS at 05:12

## 2017-12-14 RX ADMIN — CARVEDILOL 6.25 MG: 6.25 TABLET, FILM COATED ORAL at 12:12

## 2017-12-14 RX ADMIN — MAGNESIUM SULFATE IN WATER 2 G: 40 INJECTION, SOLUTION INTRAVENOUS at 01:12

## 2017-12-14 NOTE — PT/OT/SLP PROGRESS
"Occupational Therapy   Treatment    Name: Jana Stewart  MRN: [id number]  Admitting Diagnosis:  Acute on chronic combined systolic and diastolic congestive heart failure       Recommendations:     Discharge Recommendations: home health OT  Discharge Equipment Recommendations:  none  Barriers to discharge:  None    Subjective   Patient:  "You don't have the elves with you to get me out of here?  I have to get away.  I am ready to go home."  Communicated with:  nurse prior to session.  Pain/Comfort:  · Pain Rating 1: 0/10  · Pain Rating Post-Intervention 1: 0/10    Objective:   Patient did not d/c home as scheduled 12/13; will resume OT services while still in the hospital.  Patient found with: peripheral IV, bed alarm  Family not present.    General Precautions: Standard, aspiration, fall, seizure   Orthopedic Precautions:N/A   Braces: N/A     Occupational Performance:    Bed Mobility:    · Patient completed Rolling/Turning to Left with  modified independence  · Patient completed Rolling/Turning to Right with modified independence  · Patient completed Scooting/Bridging with modified independence  · Patient completed Supine to Sit with modified independence  · Patient completed Sit to Supine with modified independence      Functional Mobility/Transfers:  · Patient completed Sit <> Stand Transfer with supervision  with  no assistive device   · Patient completed Bed <> Chair Transfer using Stand Pivot technique with supervision with no assistive device   Patient with one loss of balance while ambulating to the bathroom; requiring CGA to prevent from falling.    Activities of Daily Living:  · Grooming: supervision while standing  · UB Dressing: supervision while standing  · LB Dressing: supervision while standing managing clothing  · Toileting: supervision with use of std commode     Patient left supine with all lines intact and call button in reach     [facility] 6 Click:  [facility] Total Score: 19    Treatment & " Education:  Patient education provided on role of OT and need for continued OT upon discharge.  Patient education provided on dressing technique, and transfers. Patient verbalizing understanding via teach back method. Patient instructed on need to call for assistance for toileting needs and when getting up.  Continued education, patient/ family training recommended.  Patient alert and oriented x 3; able to follow 4/4 one step commands.  Patient attentive and interactive throughout the session.  Patient's functional status and disposition recommendation discussed with patient and stroke team during daily rounds.  White board updated in patient's room.  OT asked if there were any other questions; patient/ family had no further questions.  Education:    Assessment:     Jana Stewart is a 57 y.o. male with a medical diagnosis of Embolic stroke involving right middle cerebral artery.  He presents with performance deficits affecting function are weakness, impaired sensation, impaired endurance, impaired self care skills, impaired functional mobilty, impaired balance, gait instability.  These performance deficits have resulted in activity limitations including but not limited to: transfers, ascending/ descending stairs, walking short and long distances, walking around obstacles, transitional movement patterns (kneeling, bending); eating, upper body dressing, lower body dressing, brushing teeth, toileting, and writing.   Patient's role as father, cousin and independent caretaker for self has been affected. Patient will benefit from skilled OT services to maximize level of independence with self-care skills and functional mobility.  Patient with left sided weakness and decreased awareness of deficit.  Will benefit from  OT and supervision at home.       Rehab Prognosis:  Good; patient would benefit from acute skilled OT services to address these deficits and reach maximum level of function.       Plan:     Patient to  be seen 4 x/week to address the above listed problems via self-care/home management, therapeutic activities, therapeutic exercises, neuromuscular re-education  · Plan of Care Expires: 01/07/18  · Plan of Care Reviewed with: patient    This Plan of care has been discussed with the patient who was involved in its development and understands and is in agreement with the identified goals and treatment plan    GOALS:    Occupational Therapy Goals        Problem: Occupational Therapy Goal    Goal Priority Disciplines Outcome Interventions   Occupational Therapy Goal     OT, PT/OT     Description:  Goals set 12/10 to be addressed for 7 days with expiration date, 12/17:  Patient will increase functional independence with ADLs by performing:  Patient will demonstrate stand pivot transfers with modified independence.   Not met  Patient will demonstrate grooming while standing with modified independence.   Not met  Patient will demonstrate upper body dressing with modified independence.   Not met  Patient will demonstrate lower body dressing with modified independence.   Not met  Patient will demonstrate toileting with modified independence.   Not met  Patient will demonstrate bathing while seated EOB with modified independence.   Not met  Patient's family / caregiver will demonstrate independence and safety with assisting patient with self-care skills and functional mobility.     Not met  Patient and/or patient's family will verbalize understanding of stroke prevention guidelines, personal risk factors and stroke warning signs via teachback method.  Not met                           Time Tracking:     OT Date of Treatment: 12/14/17  OT Start Time: 0800  OT Stop Time: 0827  OT Total Time (min): 27 min    Billable Minutes:Self Care/Home Management 27    LEYDI Bautista  12/14/2017

## 2017-12-14 NOTE — PLAN OF CARE
Problem: Occupational Therapy Goal  Goal: Occupational Therapy Goal  Goals set 12/10 to be addressed for 7 days with expiration date, 12/17:  Patient will increase functional independence with ADLs by performing:  Patient will demonstrate stand pivot transfers with modified independence.   Not met  Patient will demonstrate grooming while standing with modified independence.   Not met  Patient will demonstrate upper body dressing with modified independence.   Not met  Patient will demonstrate lower body dressing with modified independence.   Not met  Patient will demonstrate toileting with modified independence.   Not met  Patient will demonstrate bathing while seated EOB with modified independence.   Not met  Patient's family / caregiver will demonstrate independence and safety with assisting patient with self-care skills and functional mobility.     Not met  Patient and/or patient's family will verbalize understanding of stroke prevention guidelines, personal risk factors and stroke warning signs via teachback method.  Not met          Goals remain appropriate.  LEYDI Bautista  12/14/2017

## 2017-12-14 NOTE — PROCEDURES
Type of Device [employer] Ellipse VR   Implanted 7/10/2015  Leads RV lead only    Mode VVI    Base rate 40    Therapy zones    VT -1 166bpm/30 intervals-ATP x 3, 25, 30, 36 J x 2  VT-2 181bpm /24 intervals ATP x 3, 25, 30, 36 J x2    VF-230/20 intervals ATP x 1, 30, 36, 36 x 4    Events-None      Pacing  < 1 %    DARELL 5.8 years, 77 % remaining

## 2017-12-14 NOTE — ASSESSMENT & PLAN NOTE
56 yo M with PMHx CHF and cocaine abuse transferred for new R MCA syndrome. LKN >4.5 hours when evaluated so no alteplase administered. Upon arrival to [facility], patient had CTA MP showing R MCA LVO so he was brought to IR for thrombectomy. Suspect A fib as etiology given severe CHF, enlarged LA, and cardiology reporting intermittent SVT per his ICD interrogation.     -Antithrombotics for secondary stroke prevention: Antiplatelets:  ASA 81 mg po qd.  Apixaban 5 mg po qd, starting today.  -Statins for secondary stroke prevention/HLD, if present: Atorvastatin 40 mg po qd (LDL invalid as TGs > 479)  -Aggressive risk factor modification: HLD, Diet, Exercise, drug abuse  -Rehab Efforts: PT/OT/PM&R/SLP  -Diagnostics:  Repeat 2D Echo per Cardiology recs.  Complete.  Will start anticoagulation per their recommendations.  VTE Prophylaxis: Heparin 5000 U q8h

## 2017-12-14 NOTE — ASSESSMENT & PLAN NOTE
-2D Echo with EF 10-15%.  12 lead EKG 12/12/17 appears stable from prior.  -BNP > 4k today with CXR showing some interstitial edema.  Will consider furosemide 40 mg x 1.    -Patient will need close follow up with Cardiology.  -PET scan and repeat 2D Echo per Cardiology today--will schedule follow up, start apixaban for anticoagulation.

## 2017-12-14 NOTE — ASSESSMENT & PLAN NOTE
Mr. Stewart is a 57 y.o. Man with cardiomyopathy EF 10%, CAD s/p PCI x 3 2014 - then [facility] 3/2016 with no remaining obstructive disease (per past notes), as well as ongoing tobacco and drug abuse, now presents with acute MCA stroke s/p intervention in the setting of ongoing cardiovascular disease - new elevated troponin.     -PET rest imaging confirms cardiomyopathy likely ischemic - chronic - would treat as such, would defer to primary cardiologist as an outpatient for any potential intervention given concern for follow up.   ICD in place, continue coreg, cont ASA/plavix, cont lipitor  -With episodes of SVT on ICD, recent stroke, LAD apical ischemia - likely miranda of transient thrombus vs. Occult AF is high - would treat empirically with anti-coagulation if safe to do so per stroke - xarelto vs. eliquis if his insurance approves vs. Coumadin if not.  -Continue carvedilol at 6  -Continue lisinopril 10

## 2017-12-14 NOTE — PLAN OF CARE
"   12/14/17 1646   Final Note   Assessment Type Final Discharge Note   Discharge Disposition Home  (with out patient OT and SLP)     Patient is discharged to home with out patient OT and SLP. Patient's Eliquis prescription was sent to the patient's pharmacy for a price check. The price was 3.00 dollars. Cm spoke to the patient about the price of the Eliquis. The patient stated "All my medicines are 50 cents". Cm explained that this is a new medication to thin his blood to prevent another stroke. The patient stated "I will get my medicine to keep me out of here". Cm asked again "can you afford 3.00 dollars a month on top of your other expenses?". Patient agreed that he could afford the new Eliquis medication. Cm could not make an appt for PCP follow up in one week. But the walk-in clinic information was explained to the patient at length as well as that he needed to see a Dr. no later than wednesday 12/20/17 . Cm also put the walk-in clinic information on the patient's discharge in paperwork. Patient stated "I'll go to the clinic if that is what you say I have to do". Cm talked to patient's brother to get the patient's cousin's phone number to make sure that is where the patient lives. Cm also wanted to ensure that the patient would have 24/7 assistance per OT and SLP recommendations. Per the patient's brother the cousin's phone ran out of minutes and even he can not get in touch with the cousin. Cm asked the patient if he would consider NH placement since no one can be contacted to ensure his safety 24/7. Patient is fully oriented and competent to make his own decisions per Md. Patient states "i'm not going to no nursing home and I don't need anyone watching me 24/7. I want to go to my cousin's house." Cm asked the patient what his plan was to get into the house since he does not have a key. Patient states " there is a back part of the house that is an add-on and it is never locked". Cm asked what the patient 's " "plan was if the back door was locked. Patient stated "it is never locked and if it is I can still get in if I have to. My cousin has a lot of kids and someone is always home." Shimon explained to the patient that the  will arrange medicaid transportation to his cousin's address. Shimon notified SW.  "

## 2017-12-14 NOTE — PROGRESS NOTES
[facility]  Cardiology  Progress Note    Patient Name: Jana Stewart  MRN: [id number]  Admission Date: 12/9/2017  Hospital Length of Stay: 5 days  Code Status: Full Code   Attending Physician: Wade Flores MD   Primary Care Physician: Paul Gutierres MD  Expected Discharge Date: 12/14/2017  Principal Problem:Embolic stroke involving right middle cerebral artery    Subjective:     Hospital Course:   No notes on file    Interval History: Overnight no complaints, no shortness of breath, no palpitations, no chest pain.  TTE with contrast today without LV thrombus noted.  Rest PET stress test with anteroseptal defect.    Review of Systems   Unable to perform ROS: other (PROGRESS NOTE)     Objective:     Vital Signs (Most Recent):  Temp: 98.2 °F (36.8 °C) (12/14/17 1229)  Pulse: 99 (12/14/17 1229)  Resp: 17 (12/14/17 1229)  BP: 130/82 (12/14/17 1229)  SpO2: 98 % (12/14/17 1229) Vital Signs (24h Range):  Temp:  [97.7 °F (36.5 °C)-99.6 °F (37.6 °C)] 98.2 °F (36.8 °C)  Pulse:  [] 99  Resp:  [17-18] 17  SpO2:  [93 %-98 %] 98 %  BP: (130-173)/() 130/82     Weight: 70.3 kg (154 lb 15.7 oz)  Body mass index is 23.71 kg/m².     SpO2: 98 %  O2 Device (Oxygen Therapy): room air    No intake or output data in the 24 hours ending 12/14/17 1508    Lines/Drains/Airways     Peripheral Intravenous Line                 Peripheral IV - Single Lumen 12/09/17 0900 Left Forearm 5 days         Peripheral IV - Single Lumen 12/12/17 0548 Left Antecubital 2 days                Physical Exam   Constitutional: He is oriented to person, place, and time. No distress.   Poor hygiene, tangential in discussion.   HENT:   Head: Normocephalic and atraumatic.   Eyes: EOM are normal. No scleral icterus.   Neck: JVD (lower 1/3 of neck.) present.   Cardiovascular:   Murmur heard.  Pulmonary/Chest: Effort normal and breath sounds normal. No respiratory distress. He has no wheezes. He has no rales.   Abdominal: Soft. He  exhibits no distension. There is no tenderness. There is no rebound.   Musculoskeletal: Normal range of motion. He exhibits no edema.   Neurological: He is alert and oriented to person, place, and time.   Skin: Skin is warm and dry. He is not diaphoretic. No erythema.   Psychiatric:   Tangential     Significant Labs:   CMP     Recent Labs  Lab 12/13/17  0450 12/14/17  0422    139   K 3.6 3.8    107   CO2 22* 24   GLU 76 97   BUN 11 11   CREATININE 1.3 1.5*   CALCIUM 8.5* 9.2   PROT 6.1 7.3   ALBUMIN 2.9* 3.3*   BILITOT 0.7 1.2*   ALKPHOS 57 59   AST 36 34   ALT 13 12   ANIONGAP 7* 8   ESTGFRAFRICA >60.0 59.3*   EGFRNONAA >60.0 51.3*   , CBC     Recent Labs  Lab 12/13/17  0450 12/14/17  0422   WBC 7.00 9.01   HGB 10.0* 12.2*   HCT 29.9* 36.3*    267    and Troponin     Recent Labs  Lab 12/13/17  1006 12/13/17  1941 12/14/17  0026   TROPONINI 2.671* 2.517* 2.212*     Significant Imaging: Echocardiogram:   2D echo with color flow doppler:   Results for orders placed or performed during the hospital encounter of 12/09/17   Echo doppler color flow   Result Value Ref Range    EF 10 (A) 55 - 65    Mitral Valve Regurgitation MODERATE TO SEVERE (A)     Est. PA Systolic Pressure 46.69 (A)     Tricuspid Valve Regurgitation MODERATE (A)      Assessment and Plan:     Brief HPI: see below    Acute on chronic combined systolic and diastolic congestive heart failure    Mr. Stewart is a 57 y.o. Man with cardiomyopathy EF 10%, CAD s/p PCI x 3 2014 - then [facility] 3/2016 with no remaining obstructive disease (per past notes), as well as ongoing tobacco and drug abuse, now presents with acute MCA stroke s/p intervention in the setting of ongoing cardiovascular disease - new elevated troponin.     -PET rest imaging confirms cardiomyopathy likely ischemic - chronic - would treat as such, would defer to primary cardiologist as an outpatient for any potential intervention given concern for follow up.   ICD in place, continue  coreg, cont ASA/plavix, cont lipitor  -With episodes of SVT on ICD, recent stroke, LAD apical ischemia - likely miranda of transient thrombus vs. Occult AF is high - would treat empirically with anti-coagulation if safe to do so per stroke - xarelto vs. eliquis if his insurance approves vs. Coumadin if not.  -Continue carvedilol at 6  -Continue lisinopril 10            VTE Risk Mitigation         Ordered     heparin (porcine) injection 5,000 Units  Every 8 hours     Route:  Subcutaneous        12/11/17 1017     Medium Risk of VTE  Once      12/09/17 1911     Reason for No Pharmacological VTE Prophylaxis  Once      12/09/17 1911     Place sequential compression device  Until discontinued      12/09/17 1911          Raphael Fishman MD  Cardiology  [facility]

## 2017-12-14 NOTE — SUBJECTIVE & OBJECTIVE
Interval History: Overnight no complaints, no shortness of breath, no palpitations, no chest pain.  TTE with contrast today without LV thrombus noted.  Rest PET stress test with anteroseptal defect.    Review of Systems   Unable to perform ROS: other (PROGRESS NOTE)     Objective:     Vital Signs (Most Recent):  Temp: 98.2 °F (36.8 °C) (12/14/17 1229)  Pulse: 99 (12/14/17 1229)  Resp: 17 (12/14/17 1229)  BP: 130/82 (12/14/17 1229)  SpO2: 98 % (12/14/17 1229) Vital Signs (24h Range):  Temp:  [97.7 °F (36.5 °C)-99.6 °F (37.6 °C)] 98.2 °F (36.8 °C)  Pulse:  [] 99  Resp:  [17-18] 17  SpO2:  [93 %-98 %] 98 %  BP: (130-173)/() 130/82     Weight: 70.3 kg (154 lb 15.7 oz)  Body mass index is 23.71 kg/m².     SpO2: 98 %  O2 Device (Oxygen Therapy): room air    No intake or output data in the 24 hours ending 12/14/17 1508    Lines/Drains/Airways     Peripheral Intravenous Line                 Peripheral IV - Single Lumen 12/09/17 0900 Left Forearm 5 days         Peripheral IV - Single Lumen 12/12/17 0548 Left Antecubital 2 days                Physical Exam   Constitutional: He is oriented to person, place, and time. No distress.   Poor hygiene, tangential in discussion.   HENT:   Head: Normocephalic and atraumatic.   Eyes: EOM are normal. No scleral icterus.   Neck: JVD (lower 1/3 of neck.) present.   Cardiovascular:   Murmur heard.  Pulmonary/Chest: Effort normal and breath sounds normal. No respiratory distress. He has no wheezes. He has no rales.   Abdominal: Soft. He exhibits no distension. There is no tenderness. There is no rebound.   Musculoskeletal: Normal range of motion. He exhibits no edema.   Neurological: He is alert and oriented to person, place, and time.   Skin: Skin is warm and dry. He is not diaphoretic. No erythema.   Psychiatric:   Tangential     Significant Labs:   CMP     Recent Labs  Lab 12/13/17  0450 12/14/17  0422    139   K 3.6 3.8    107   CO2 22* 24   GLU 76 97   BUN 11 11    CREATININE 1.3 1.5*   CALCIUM 8.5* 9.2   PROT 6.1 7.3   ALBUMIN 2.9* 3.3*   BILITOT 0.7 1.2*   ALKPHOS 57 59   AST 36 34   ALT 13 12   ANIONGAP 7* 8   ESTGFRAFRICA >60.0 59.3*   EGFRNONAA >60.0 51.3*   , CBC     Recent Labs  Lab 12/13/17  0450 12/14/17  0422   WBC 7.00 9.01   HGB 10.0* 12.2*   HCT 29.9* 36.3*    267    and Troponin     Recent Labs  Lab 12/13/17  1006 12/13/17  1941 12/14/17  0026   TROPONINI 2.671* 2.517* 2.212*     Significant Imaging: Echocardiogram:   2D echo with color flow doppler:   Results for orders placed or performed during the hospital encounter of 12/09/17   Echo doppler color flow   Result Value Ref Range    EF 10 (A) 55 - 65    Mitral Valve Regurgitation MODERATE TO SEVERE (A)     Est. PA Systolic Pressure 46.69 (A)     Tricuspid Valve Regurgitation MODERATE (A)

## 2017-12-14 NOTE — PROGRESS NOTES
56 yo male with severe heart failure who presented with embolic stroke to right middle cerebral artery territory.  Treated successfully with thrombectomy.    Areas of cytotoxic cerebral edema noted on follow up CT scan, but great clinical improvement.    Plan for discharge soon.    Wade Flores MD  Vascular and Interventional Neurology Staff  Director of [facility]  [facility]  [phone]

## 2017-12-14 NOTE — PROGRESS NOTES
Consent obtained.  optison given ivp via left forearm sl for imaging. Denies transfusion rxn. Tolerated well. Sl flushed before and after with ns.

## 2017-12-14 NOTE — PLAN OF CARE
Problem: Patient Care Overview  Goal: Plan of Care Review  Outcome: Ongoing (interventions implemented as appropriate)  POC reviewed with patient. Patient c/o headache d/t hypertension; carvedilol and tylenol given.  AAOx4. VSS. Free from falls. Calm and cooperative.

## 2017-12-14 NOTE — SUBJECTIVE & OBJECTIVE
Neurologic Chief Complaint: R MCA stroke s/p thrombectomy    Subjective:     Interval History: Patient is seen for follow-up neurological assessment and treatment recommendations:   Patient feels well today but wants to go home.  Denies chest pain and SOB this am.  Discussed plan for discharge today on new anticoagulation based on Cardiology recommendations.  Patient seems to understand that he needs to take this pill twice daily.  Has trouble articulating why he is taking new medication but eventually states that he has heart trouble and needs to take a new medication so he stays healthy.      HPI, Past Medical, Family, and Social History remains the same as documented in the initial encounter.     Review of Systems   Constitutional: Negative for chills and fever.   Eyes: Negative for photophobia and visual disturbance.   Respiratory: Negative for cough and shortness of breath.    Cardiovascular: Negative for chest pain and palpitations.   Gastrointestinal: Negative for constipation, diarrhea, nausea and vomiting.   Musculoskeletal: Negative for arthralgias and myalgias.   Skin: Negative for rash and wound.   Neurological: Positive for speech difficulty and weakness.   Hematological: Negative for adenopathy. Does not bruise/bleed easily.   Psychiatric/Behavioral: Positive for agitation and confusion.     Scheduled Meds:   aspirin  325 mg Oral Daily    atorvastatin  80 mg Oral Daily    carvedilol  6.25 mg Oral BID    clopidogrel  75 mg Oral Daily    folic acid  1 mg Oral Daily    furosemide  40 mg Intravenous Once    heparin (porcine)  5,000 Units Subcutaneous Q8H    lisinopril  10 mg Oral Daily    senna-docusate 8.6-50 mg  1 tablet Oral BID    sodium chloride 0.9%  3 mL Intravenous Q8H    thiamine  100 mg Oral Daily     Continuous Infusions:    PRN Meds:acetaminophen, ondansetron, sodium chloride 0.9%    Objective:     Vital Signs (Most Recent):  Temp: 98.2 °F (36.8 °C) (12/14/17 1229)  Pulse: 99  (12/14/17 1229)  Resp: 17 (12/14/17 1229)  BP: 130/82 (12/14/17 1229)  SpO2: 98 % (12/14/17 1229)  BP Location: Right arm  Vital Signs Range (Last 24H):  Temp:  [98.2 °F (36.8 °C)-99.6 °F (37.6 °C)]   Pulse:  []   Resp:  [17]   BP: (130-173)/()   SpO2:  [93 %-98 %]   BP Location: Right arm    Physical Exam  General:  Well-developed, well-nourished, nad  HEENT:  NCAT, PERRLA, EOMI, oropharyngeal membranes non-erythematous/without exudate  Neck:  Supple, normal ROM without nuchal rigidity.  Resp:  Symmetric expansion, no increased wob  CVS:  RRR, no m/r/g appreciated.  No LE edema, peripheral pulses 2+ (radial, dorsalis pedis).  GI:  Abd soft, non-distended, non-tender to palpation  Neurological Exam:   Mental Status:  Awake, alert, oriented to self, location, and month/year.  Seems to lack insight into condition.  Speech is frequently tangential.  Following commands well and otherwise no notable word-finding difficulties, paraphasic errors, or receptive aphasia.  Cranial Nerves:  VFs intact to movement in all quadrants bilaterally.  PERRLA, EOMI.  Facial movement and sensation intact.  Palate raises symmetrically, tongue protrudes midline.  Trapezius 5/5 bilaterally.    Motor:  Normal bulk and tone.  Strength 5/5 RUE/RLE.  L  strength 5/5, L biceps/triceps 4/5.  LLE with some drift.  LLE plantarflexion, dorsiflexion 5/5.  Sensory:  Intact to light touch with some inattention L compared to R.  Vibratory sensation intact BUE/BLE digits.  Reflexes:  Biceps, brachioradialis, patellar, Achilles 2+.  No ankle clonus.  Equivocal toe bilaterally.  Coordination:  FNF, ERIC, HTS intact.  No resting tremor or myoclonus.  Gait:  Deferred 2/2 fall risk    Laboratory:  CMP:     Recent Labs  Lab 12/14/17  0422   CALCIUM 9.2   ALBUMIN 3.3*   PROT 7.3      K 3.8   CO2 24      BUN 11   CREATININE 1.5*   ALKPHOS 59   ALT 12   AST 34   BILITOT 1.2*     CBC:     Recent Labs  Lab 12/14/17  0422   WBC 9.01   RBC  4.33*   HGB 12.2*   HCT 36.3*      MCV 84   MCH 28.2   MCHC 33.6     Lipid Panel:     Recent Labs  Lab 12/09/17  2329   CHOL 356*   LDLCALC Invalid, Trig>400.0   HDL 7*   TRIG 479*     Hgb A1C:     Recent Labs  Lab 12/09/17  2329   HGBA1C 5.3     TSH:     Recent Labs  Lab 12/09/17  1607   TSH 0.411     Diagnostic Results     Brain Imaging   12/09/17 CT head w/o contrast:  1. Findings suggestive of evolving recent infarct involving a portion of the right MCA territory. No definite evidence of hemorrhage. No midline shift or hydrocephalus.  2. Remote left parietal infarct. Possible small remote left cerebellar infarct.       No MRI Brain 2/2 ICD.    Vessel Imaging   12/09/17 CTA Stroke Multiphase:  Focal occlusion of the distal M2 segment of the right MCA, with early ischemic changes in the right middle cerebral artery distribution.  There are findings suggestive of a multiphase CTA score of 4.  Greater than 50% stenosis of the right vertebral artery at the V4 segment, with narrow caliber of the remaining vertebral artery and basilar artery.  The left vertebral artery terminates with its supply to the PICA.  Much of supply to posterior circulation comes via posterior communicating arteries.       12/09/17 IR angiogram carotid internal inc arch and cerebral unilateral  1. Successful aspiration thrombectomy of the right middle cerebral artery.  2. TICI 3 of the right middle cerebral artery artery.    Cardiac Imaging   12/11/17 2D Echo w/ CFD:  CONCLUSIONS     1 - Eccentric LVH with severely depressed left ventricular systolic function (EF 10-15%).     2 - Severe left atrial enlargement.     3 - Mildly depressed right ventricular systolic function .     4 - Moderate to severe mitral regurgitation.     5 - Moderate tricuspid regurgitation.     6 - Pulmonary hypertension. The estimated PA systolic pressure is 47 mmHg.     7 - Intermediate central venous pressure.

## 2017-12-14 NOTE — PLAN OF CARE
Problem: Patient Care Overview  Goal: Plan of Care Review  Recommendation/Intervention:   1. Rec Cardiac diet with texture per SLP.   2. Encourage PO intake.     Goals: >85% of EEN, EPN  Nutrition Goal Status: progressing towards goal

## 2017-12-14 NOTE — PT/OT/SLP PROGRESS
Speech Language Pathology Note  Attempted    Attempted to see pt at 1000; however, pt LUIS MANUEL. SLP to fu at next scheduled visit. Thank you.    Hallie Kruger M.A. CCC-SLP  Speech Language Pathologist  [phone]  12/14/2017

## 2017-12-14 NOTE — PT/OT/SLP DISCHARGE
Occupational Therapy Discharge Summary    Jana Stewart  MRN: [id number]   Principal Problem: Embolic stroke involving right middle cerebral artery      Patient Discharged from acute Occupational Therapy on 12/14*  Please refer to prior OT note dated12/14 for functional status.    Assessment:      Patient appropriate for care in another setting.    Objective:     GOALS:    Occupational Therapy Goals        Problem: Occupational Therapy Goal    Goal Priority Disciplines Outcome Interventions   Occupational Therapy Goal     OT, PT/OT     Description:  Goals set 12/10 to be addressed for 7 days with expiration date, 12/17:  Patient will increase functional independence with ADLs by performing:  Patient will demonstrate stand pivot transfers with modified independence.   Not met  Patient will demonstrate grooming while standing with modified independence.   Not met  Patient will demonstrate upper body dressing with modified independence.   Not met  Patient will demonstrate lower body dressing with modified independence.   Not met  Patient will demonstrate toileting with modified independence.   Not met  Patient will demonstrate bathing while seated EOB with modified independence.   Not met  Patient's family / caregiver will demonstrate independence and safety with assisting patient with self-care skills and functional mobility.     Not met  Patient and/or patient's family will verbalize understanding of stroke prevention guidelines, personal risk factors and stroke warning signs via teachback method.  Not met                           Reasons for Discontinuation of Therapy Services  Transfer to alternate level of care.      Plan:     Patient Discharged to:  with supervision    LEYDI Bautista  12/14/2017

## 2017-12-14 NOTE — PROGRESS NOTES
Resting portion of pet stress test complete. Pt had coffee this am, so the stress test could not be completed. Dr. Kye rocha.

## 2017-12-14 NOTE — PROGRESS NOTES
[facility]  Vascular Neurology  Comprehensive Stroke Center  Progress Note    Assessment/Plan:     * Embolic stroke involving right middle cerebral artery    58 yo M with PMHx CHF and cocaine abuse transferred for new R MCA syndrome. LKN >4.5 hours when evaluated so no alteplase administered. Upon arrival to [facility], patient had CTA MP showing R MCA LVO so he was brought to IR for thrombectomy. Suspect A fib as etiology given severe CHF, enlarged LA, and cardiology reporting intermittent SVT per his ICD interrogation.     -Antithrombotics for secondary stroke prevention: Antiplatelets:  ASA 81 mg po qd.  Apixaban 5 mg po qd, starting today.  -Statins for secondary stroke prevention/HLD, if present: Atorvastatin 40 mg po qd (LDL invalid as TGs > 479)  -Aggressive risk factor modification: HLD, Diet, Exercise, drug abuse  -Rehab Efforts: PT/OT/PM&R/SLP  -Diagnostics:  Repeat 2D Echo per Cardiology recs.  Complete.  Will start anticoagulation per their recommendations.  VTE Prophylaxis: Heparin 5000 U q8h      Cytotoxic cerebral edema    -Seen on imaging, 2/2 R MCA stroke      Mixed hyperlipidemia    -Total cholesterol 356, , HDL 7, LDL invalid 2/2 TGs  -Continue atorvastatin 40 mg po qd  -Consider additional treatment for hypertriglyceridemia, set up with PCP on discharge      Acute on chronic combined systolic and diastolic congestive heart failure    -2D Echo with EF 10-15%.  12 lead EKG 12/12/17 appears stable from prior.  -BNP > 4k today with CXR showing some interstitial edema.  Will consider furosemide 40 mg x 1.    -Patient will need close follow up with Cardiology.  -PET scan and repeat 2D Echo per Cardiology today--will schedule follow up, start apixaban for anticoagulation.      Cocaine abuse    -+ cocaine on tox screen at OSH  - Denies during interview.  Counseled on risks of cocaine use.      12/09/17:  Admission to Neuro ICU.  IR for thrombectomy due to R MCA occlusion.  12/10/17:   Patient with slight improvement after procedure.  Some sedation during exam, some of weakness documented likely related to sedated state.  12/11/17:  Patient awake with confusion, but coherent speech.  Moving LUE, LLE more today.  2D Echo showing severely depressed EF (reported by tech at 8%).  12/12/2017 Patient w/ rapid pressured speech. Placement is an issue but patient w/o the need for continuous inpatient placement as he is medically cleared for discharge. Placement per primary team's . Will continue to follow and assist when needed. Will require outpatient SLP therapy   12/13/17:  Pt with continued chest pain.  Noted in previous notes by Neuro ICU.  Appears SOB.  CXR this am with interstitial edema, but not significantly worse than prior.  Patient's exam/symptoms waxing/waning.  Will check BNP, cardiac enzymes, 12 lead EKG to ensure proper work up.  Likely severe CHF at patient's baseline.  12/14/17:  PET scan and repeat Echo by Cardiology today.  Noted opinion that patient should be on anticoagulation.  With patient's insurance as well as current Cr/age/weight, he is able to take apixaban 5 mg po bid.  OT/ST orders printed out and given to patient.  Follow up planned for patient in Vascular Neurology and Cardiology clinic.      STROKE DOCUMENTATION   Acute Stroke Times   Last Known Normal Date: 12/08/17  Last Known Normal Time:  (unknown LKN - not listed in chart & no family present to confirm)  Symptom Onset Date: 12/09/17  Symptom Onset Time: 0700 (patient found symptomatic at 7am)  Stroke Team Called Date: 12/09/17  Stroke Team Called Time: 1527 (1:30 pm note from telestroke, pt transferred to [facility])  Stroke Team Arrival Date: 12/09/17  Stroke Team Arrival Time: 1528  CT Interpretation Time: 1550  Decision to Treat Time for Alteplase:  (n/a outside window)  Decision to Treat Time for IR: 1550    NIH Scale:  1a. Level Of Consciousness: 0-->Alert: keenly responsive  1b. LOC Questions: 0-->Answers  both questions correctly  1c. LOC Commands: 0-->Performs both tasks correctly  2. Best Gaze: 0-->Normal  3. Visual: 0-->No visual loss  4. Facial Palsy: 0-->Normal symmetrical movements  5a. Motor Arm, Left: 0-->No drift: limb holds 90 (or 45) degrees for full 10 secs  5b. Motor Arm, Right: 0-->No drift: limb holds 90 (or 45) degrees for full 10 secs  6a. Motor Leg, Left: 1-->Drift: leg falls by the end of the 5-sec period but does not hit bed  6b. Motor Leg, Right: 0-->No drift: leg holds 30 degree position for full 5 secs  7. Limb Ataxia: 0-->Absent  8. Sensory: 0-->Normal: no sensory loss  9. Best Language: 1-->Mild-to-moderate aphasia: some obvious loss of fluency or facility of comprehension, without significant limitation on ideas expressed or form of expression. Reduction of speech and/or comprehension, however, makes conversation. . . (see row details)  10. Dysarthria: 0-->Normal  11. Extinction and Inattention (formerly Neglect): 0-->No abnormality  Total (NIH Stroke Scale): 2     Modified Glascock Score: 1  Earp Coma Scale:14   ABCD2 Score:    [id number] Score:   HAS -BLED Score:   ICH Score:   Hunt & Rosa Classification:      Hemorrhagic change of an Ischemic Stroke: Does this patient have an ischemic stroke with hemorrhagic changes? No     Neurologic Chief Complaint: R MCA stroke s/p thrombectomy    Subjective:     Interval History: Patient is seen for follow-up neurological assessment and treatment recommendations:   Patient feels well today but wants to go home.  Denies chest pain and SOB this am.  Discussed plan for discharge today on new anticoagulation based on Cardiology recommendations.  Patient seems to understand that he needs to take this pill twice daily.  Has trouble articulating why he is taking new medication but eventually states that he has heart trouble and needs to take a new medication so he stays healthy.      HPI, Past Medical, Family, and Social History remains the same as  documented in the initial encounter.     Review of Systems   Constitutional: Negative for chills and fever.   Eyes: Negative for photophobia and visual disturbance.   Respiratory: Negative for cough and shortness of breath.    Cardiovascular: Negative for chest pain and palpitations.   Gastrointestinal: Negative for constipation, diarrhea, nausea and vomiting.   Musculoskeletal: Negative for arthralgias and myalgias.   Skin: Negative for rash and wound.   Neurological: Positive for speech difficulty and weakness.   Hematological: Negative for adenopathy. Does not bruise/bleed easily.   Psychiatric/Behavioral: Positive for agitation and confusion.     Scheduled Meds:   aspirin  325 mg Oral Daily    atorvastatin  80 mg Oral Daily    carvedilol  6.25 mg Oral BID    clopidogrel  75 mg Oral Daily    folic acid  1 mg Oral Daily    furosemide  40 mg Intravenous Once    heparin (porcine)  5,000 Units Subcutaneous Q8H    lisinopril  10 mg Oral Daily    senna-docusate 8.6-50 mg  1 tablet Oral BID    sodium chloride 0.9%  3 mL Intravenous Q8H    thiamine  100 mg Oral Daily     Continuous Infusions:    PRN Meds:acetaminophen, ondansetron, sodium chloride 0.9%    Objective:     Vital Signs (Most Recent):  Temp: 98.2 °F (36.8 °C) (12/14/17 1229)  Pulse: 99 (12/14/17 1229)  Resp: 17 (12/14/17 1229)  BP: 130/82 (12/14/17 1229)  SpO2: 98 % (12/14/17 1229)  BP Location: Right arm  Vital Signs Range (Last 24H):  Temp:  [98.2 °F (36.8 °C)-99.6 °F (37.6 °C)]   Pulse:  []   Resp:  [17]   BP: (130-173)/()   SpO2:  [93 %-98 %]   BP Location: Right arm    Physical Exam  General:  Well-developed, well-nourished, nad  HEENT:  NCAT, PERRLA, EOMI, oropharyngeal membranes non-erythematous/without exudate  Neck:  Supple, normal ROM without nuchal rigidity.  Resp:  Symmetric expansion, no increased wob  CVS:  RRR, no m/r/g appreciated.  No LE edema, peripheral pulses 2+ (radial, dorsalis pedis).  GI:  Abd soft,  non-distended, non-tender to palpation  Neurological Exam:   Mental Status:  Awake, alert, oriented to self, location, and month/year.  Seems to lack insight into condition.  Speech is frequently tangential.  Following commands well and otherwise no notable word-finding difficulties, paraphasic errors, or receptive aphasia.  Cranial Nerves:  VFs intact to movement in all quadrants bilaterally.  PERRLA, EOMI.  Facial movement and sensation intact.  Palate raises symmetrically, tongue protrudes midline.  Trapezius 5/5 bilaterally.    Motor:  Normal bulk and tone.  Strength 5/5 RUE/RLE.  L  strength 5/5, L biceps/triceps 4/5.  LLE with some drift.  LLE plantarflexion, dorsiflexion 5/5.  Sensory:  Intact to light touch with some inattention L compared to R.  Vibratory sensation intact BUE/BLE digits.  Reflexes:  Biceps, brachioradialis, patellar, Achilles 2+.  No ankle clonus.  Equivocal toe bilaterally.  Coordination:  FNF, ERIC, HTS intact.  No resting tremor or myoclonus.  Gait:  Deferred 2/2 fall risk    Laboratory:  CMP:     Recent Labs  Lab 12/14/17  0422   CALCIUM 9.2   ALBUMIN 3.3*   PROT 7.3      K 3.8   CO2 24      BUN 11   CREATININE 1.5*   ALKPHOS 59   ALT 12   AST 34   BILITOT 1.2*     CBC:     Recent Labs  Lab 12/14/17  0422   WBC 9.01   RBC 4.33*   HGB 12.2*   HCT 36.3*      MCV 84   MCH 28.2   MCHC 33.6     Lipid Panel:     Recent Labs  Lab 12/09/17  2329   CHOL 356*   LDLCALC Invalid, Trig>400.0   HDL 7*   TRIG 479*     Hgb A1C:     Recent Labs  Lab 12/09/17  2329   HGBA1C 5.3     TSH:     Recent Labs  Lab 12/09/17  1607   TSH 0.411     Diagnostic Results     Brain Imaging   12/09/17 CT head w/o contrast:  1. Findings suggestive of evolving recent infarct involving a portion of the right MCA territory. No definite evidence of hemorrhage. No midline shift or hydrocephalus.  2. Remote left parietal infarct. Possible small remote left cerebellar infarct.       No MRI Brain 2/2  ICD.    Vessel Imaging   12/09/17 CTA Stroke Multiphase:  Focal occlusion of the distal M2 segment of the right MCA, with early ischemic changes in the right middle cerebral artery distribution.  There are findings suggestive of a multiphase CTA score of 4.  Greater than 50% stenosis of the right vertebral artery at the V4 segment, with narrow caliber of the remaining vertebral artery and basilar artery.  The left vertebral artery terminates with its supply to the PICA.  Much of supply to posterior circulation comes via posterior communicating arteries.       12/09/17 IR angiogram carotid internal inc arch and cerebral unilateral  1. Successful aspiration thrombectomy of the right middle cerebral artery.  2. TICI 3 of the right middle cerebral artery artery.    Cardiac Imaging   12/11/17 2D Echo w/ CFD:  CONCLUSIONS     1 - Eccentric LVH with severely depressed left ventricular systolic function (EF 10-15%).     2 - Severe left atrial enlargement.     3 - Mildly depressed right ventricular systolic function .     4 - Moderate to severe mitral regurgitation.     5 - Moderate tricuspid regurgitation.     6 - Pulmonary hypertension. The estimated PA systolic pressure is 47 mmHg.     7 - Intermediate central venous pressure.     Bettie Tsai MD  Comprehensive Stroke Center  Department of Vascular Neurology   [facility]

## 2017-12-14 NOTE — ASSESSMENT & PLAN NOTE
Nutrition Problem  Swallowing difficulty     Related to (etiology):   Stroke     Signs and Symptoms (as evidenced by):   SLP findings     Interventions/Recommendations (treatment strategy):  See recs     Nutrition Diagnosis Status:   Continues

## 2017-12-14 NOTE — DISCHARGE SUMMARY
"[facility]  Vascular Neurology  Comprehensive Stroke Center  Discharge Summary     Summary:     Admit Date: 12/9/2017  3:37 PM    Discharge Date and Time:  12/14/2017 5:52 PM    Attending Physician: Dr. Wade Flores MD    Discharge Provider: Bettie Tsai MD    History of Present Illness: Mr. Stewart is a 58 yo M with history of prior L hemispheric stroke, cocaine and marijuana use who is a transfer from [facility] for R MCA syndrome. Patient was admitted to OSH yesterday to rule out MI. Tox screen was positive on admission for cocaine. This morning he fell out of bed and was found with LSW and R gaze preference. Last known normal was >4.5 hours prior to evaluation so he was not a tpa candidate. He was transferred to [facility] [place] and had CTA MP on arrival showing a R M2 occlusion.   No family is at bedside and patient is confused so unable to obtain further history on patient. Attempted to call "relative" Sri listed in chart but no answer.     Hospital Course (synopsis of major diagnoses, care, treatment, and services provided during the course of the hospital stay): 12/09/17:  Admission to Neuro ICU.  IR for thrombectomy due to R MCA occlusion.  12/10/17:  Patient with slight improvement after procedure.  Some sedation during exam, some of weakness documented likely related to sedated state.  12/11/17:  Patient awake with confusion, but coherent speech.  Moving LUE, LLE more today.  2D Echo showing severely depressed EF (reported by tech at 8%).  12/12/2017 Patient w/ rapid pressured speech. Placement is an issue but patient w/o the need for continuous inpatient placement as he is medically cleared for discharge. Placement per primary team's . Will continue to follow and assist when needed. Will require outpatient SLP therapy   12/13/17:  Pt with continued chest pain.  Noted in previous notes by Neuro ICU.  Appears SOB.  CXR this am with interstitial edema, but not " significantly worse than prior.  Patient's exam/symptoms waxing/waning.  Will check BNP, cardiac enzymes, 12 lead EKG to ensure proper work up.  Likely severe CHF at patient's baseline.  12/14/17:  PET scan and repeat Echo by Cardiology today.  Noted opinion that patient should be on anticoagulation.  With patient's insurance as well as current Cr/age/weight, he is able to take apixaban 5 mg po bid.  OT/ST orders printed out and given to patient.  Follow up planned for patient in Vascular Neurology and Cardiology clinic.      STROKE DOCUMENTATION   Acute Stroke Times   Last Known Normal Date: 12/08/17  Last Known Normal Time:  (unknown LKN - not listed in chart & no family present to confirm)  Symptom Onset Date: 12/09/17  Symptom Onset Time: 0700 (patient found symptomatic at 7am)  Stroke Team Called Date: 12/09/17  Stroke Team Called Time: 1527 (1:30 pm note from telestroke, pt transferred to [facility] [place])  Stroke Team Arrival Date: 12/09/17  Stroke Team Arrival Time: 1528  CT Interpretation Time: 1550  Decision to Treat Time for Alteplase:  (n/a outside window)  Decision to Treat Time for IR: 1550     NIH Scale:  1a. Level Of Consciousness: 0-->Alert: keenly responsive  1b. LOC Questions: 0-->Answers both questions correctly  1c. LOC Commands: 0-->Performs both tasks correctly  2. Best Gaze: 0-->Normal  3. Visual: 0-->No visual loss  4. Facial Palsy: 0-->Normal symmetrical movements  5a. Motor Arm, Left: 0-->No drift: limb holds 90 (or 45) degrees for full 10 secs  5b. Motor Arm, Right: 0-->No drift: limb holds 90 (or 45) degrees for full 10 secs  6a. Motor Leg, Left: 1-->Drift: leg falls by the end of the 5-sec period but does not hit bed  6b. Motor Leg, Right: 0-->No drift: leg holds 30 degree position for full 5 secs  7. Limb Ataxia: 0-->Absent  8. Sensory: 0-->Normal: no sensory loss  9. Best Language: 1-->Mild-to-moderate aphasia: some obvious loss of fluency or facility of comprehension, without significant  limitation on ideas expressed or form of expression. Reduction of speech and/or comprehension, however, makes conversation. . . (see row details)  10. Dysarthria: 0-->Normal  11. Extinction and Inattention (formerly Neglect): 0-->No abnormality  Total (NIH Stroke Scale): 2    Modified [place] Score: 1  [place] Coma Scale:14   ABCD2 Score:    [id number] Score:4  HAS -BLED Score:2  ICH Score:   Hunt & Rosa Classification:     Assessment/Plan:     Diagnostic Results:    Brain Imaging   12/09/17 CT head w/o contrast:  1. Findings suggestive of evolving recent infarct involving a portion of the right MCA territory. No definite evidence of hemorrhage. No midline shift or hydrocephalus.  2. Remote left parietal infarct. Possible small remote left cerebellar infarct.       No MRI Brain 2/2 ICD.     Vessel Imaging   12/09/17 CTA Stroke Multiphase:  Focal occlusion of the distal M2 segment of the right MCA, with early ischemic changes in the right middle cerebral artery distribution.  There are findings suggestive of a multiphase CTA score of 4.  Greater than 50% stenosis of the right vertebral artery at the V4 segment, with narrow caliber of the remaining vertebral artery and basilar artery.  The left vertebral artery terminates with its supply to the PICA.  Much of supply to posterior circulation comes via posterior communicating arteries.        12/09/17 IR angiogram carotid internal inc arch and cerebral unilateral  1. Successful aspiration thrombectomy of the right middle cerebral artery.  2. TICI 3 of the right middle cerebral artery artery.     Cardiac Imaging   12/11/17 2D Echo w/ CFD:  CONCLUSIONS     1 - Eccentric LVH with severely depressed left ventricular systolic function (EF 10-15%).     2 - Severe left atrial enlargement.     3 - Mildly depressed right ventricular systolic function .     4 - Moderate to severe mitral regurgitation.     5 - Moderate tricuspid regurgitation.     6 - Pulmonary hypertension. The  estimated PA systolic pressure is 47 mmHg.     7 - Intermediate central venous pressure.     Interventions: Thrombectomy    Complications: None    Disposition: Home or Self Care    Final Active Diagnoses:    Diagnosis Date Noted POA    PRINCIPAL PROBLEM:  Embolic stroke involving right middle cerebral artery [I63.411] 12/09/2017 Yes    Cytotoxic cerebral edema [G93.6] 12/10/2017 Yes    Acute on chronic combined systolic and diastolic congestive heart failure [I50.43] 12/13/2017 Yes    Mixed hyperlipidemia [E78.2] 12/11/2017 Unknown    Cocaine abuse [F14.10] 12/09/2017 Yes    Malnutrition [E46] 12/11/2017 Yes    ETOHism [F10.20] 12/11/2017 Yes    Agitation [R45.1] 12/10/2017 Yes    Cocaine dependence with intoxication [F14.229] 12/10/2017 Yes    Mild tetrahydrocannabinol (THC) abuse [F12.10] 12/10/2017 Yes    Chest pain due to myocardial ischemia [I20.9] 12/10/2017 Yes    Agitation requiring sedation protocol [R45.1] 12/10/2017 Yes    Anxiety [F41.9] 09/10/2016 Yes      Problems Resolved During this Admission:    Diagnosis Date Noted Date Resolved POA    Chest pain [R07.9] 08/23/2016 12/12/2017 Yes     * Embolic stroke involving right middle cerebral artery    56 yo M with PMHx CHF and cocaine abuse transferred for new R MCA syndrome. LKN >4.5 hours when evaluated so no alteplase administered. Upon arrival to [facility], patient had CTA MP showing R MCA LVO so he was brought to IR for thrombectomy. Suspect A fib as etiology given severe CHF, enlarged LA, and cardiology reporting intermittent SVT per his ICD interrogation.     -Antithrombotics for secondary stroke prevention: Antiplatelets:  ASA 81 mg po qd.  Apixaban 5 mg po qd, starting today.  -Statins for secondary stroke prevention/HLD, if present: Atorvastatin 40 mg po qd (LDL invalid as TGs > 479)  -Aggressive risk factor modification: HLD, Diet, Exercise, drug abuse  -Rehab Efforts: PT/OT/PM&R/SLP  -Diagnostics:  Repeat 2D Echo per Cardiology recs.   Complete.  Will start anticoagulation per their recommendations.  VTE Prophylaxis: Heparin 5000 U q8h        Cytotoxic cerebral edema    -Seen on imaging, 2/2 R MCA stroke        Mixed hyperlipidemia    -Total cholesterol 356, , HDL 7, LDL invalid 2/2 TGs  -Continue atorvastatin 40 mg po qd  -Consider additional treatment for hypertriglyceridemia, set up with PCP on discharge        Acute on chronic combined systolic and diastolic congestive heart failure    -2D Echo with EF 10-15%.  12 lead EKG 12/12/17 appears stable from prior.  -BNP > 4k today with CXR showing some interstitial edema.  Will consider furosemide 40 mg x 1.    -Patient will need close follow up with Cardiology.  -PET scan and repeat 2D Echo per Cardiology today--will schedule follow up, start apixaban for anticoagulation.        Cocaine abuse    -+ cocaine on tox screen at OSH  - Denies during interview.  Counseled on risks of cocaine use.            Recommendations:     Post-discharge complication risks: Falls    Stroke Education given to: patient    Follow-up in Stroke Clinic in 30 days.     Discharge Plan:  Antithrombotics: Aspirin 81 mg, Clopidogrel 75 mg po qd  Statin: Atorvastatin 40 mg  Anticoagulant: Warfarin  Aggresive risk factor modification:  Hypertension  High Cholesterol  Diet  Exercise  Atrial Fibrillation  Carotid Artery disease  Drug Use--Cocaine + from OSH    Follow Up:  Follow-up Information     Go to [facility] Walk-in clinic.    Why:  Walk-in clinic  Mon-Fri 5pm-9pm Sat & Sun 7am- 3pm. You need to go to the walk-in clinic and be seen by an internal medicine DrAilyn by wednesday 12/20/17   Contact information:  [id number]                 Patient Instructions:     Ambulatory consult to Speech Therapy   Referral Priority: Routine Referral Type: Speech Therapy   Referral Reason: Specialty Services Required    Requested Specialty: Speech Pathology    Number of Visits Requested: 1      Ambulatory consult  to Occupational Therapy   Referral Priority: Routine Referral Type: Occupational Therapy   Referral Reason: Specialty Services Required    Requested Specialty: Occupational Therapy    Number of Visits Requested: 1      Ambulatory Referral to Cardiology   Referral Priority: Routine Referral Type: Consultation   Referral Reason: Specialty Services Required    Requested Specialty: Cardiology    Number of Visits Requested: 1      Ambulatory Referral to Vascular Neurology   Referral Priority: Routine Referral Type: Consultation   Referral Reason: Specialty Services Required    Requested Specialty: Vascular Neurology    Number of Visits Requested: 1      Medications:  Reconciled Home Medications:   Discharge Medication List as of 12/14/2017  4:17 PM      START taking these medications    Details   apixaban 5 mg Tab Take 1 tablet (5 mg total) by mouth 2 (two) times daily., Starting Thu 12/14/2017, Normal      folic acid (FOLVITE) 1 MG tablet Take 1 tablet (1 mg total) by mouth once daily., Starting Fri 12/15/2017, Until Sat 12/15/2018, Normal         CONTINUE these medications which have NOT CHANGED    Details   alprazolam (XANAX) 1 MG tablet Take 1 mg by mouth 2 (two) times daily as needed for Anxiety., Starting 3/1/2017, Until Discontinued, Historical Med      amiodarone (PACERONE) 200 MG Tab Take 200 mg by mouth 2 (two) times daily., Starting 1/30/2017, Until Discontinued, Historical Med      aspirin (ECOTRIN) 81 MG EC tablet Take 81 mg by mouth once daily., Until Discontinued, Historical Med      atorvastatin (LIPITOR) 40 MG tablet Take 40 mg by mouth once daily., Until Discontinued, Historical Med      carvedilol (COREG) 25 MG tablet Take 1 tablet (25 mg total) by mouth 2 (two) times daily with meals., Starting 8/25/2016, Until Fri 8/25/17, Normal      citalopram (CELEXA) 40 MG tablet Take 40 mg by mouth once daily., Until Discontinued, Historical Med      clopidogrel (PLAVIX) 75 mg tablet Take 1 tablet (75 mg total)  by mouth once daily., Starting 8/25/2016, Until Fri 8/25/17, Normal      furosemide (LASIX) 40 MG tablet TK 1 T PO  QD, Historical Med      hydrOXYzine pamoate (VISTARIL) 50 MG Cap Take 50 mg by mouth every evening., Starting 1/30/2017, Until Discontinued, Historical Med      isosorbide mononitrate (IMDUR) 30 MG 24 hr tablet Take 1 tablet (30 mg total) by mouth once daily., Starting 8/25/2016, Until Fri 8/25/17, Normal      lisinopril (PRINIVIL,ZESTRIL) 20 MG tablet Take 1 tablet (20 mg total) by mouth once daily., Starting 8/25/2016, Until Fri 8/25/17, Normal      midodrine (PROAMATINE) 5 MG Tab Take 5 mg by mouth 3 (three) times daily with meals. , Until Discontinued, Historical Med      mirtazapine (REMERON) 15 MG tablet Take 2 tablets (30 mg total) by mouth every evening., Starting 9/10/2016, Until Discontinued, Print      potassium chloride SA (K-DUR,KLOR-CON) 20 MEQ tablet Take 20 mEq by mouth 2 (two) times daily., Starting 3/1/2017, Until Discontinued, Historical Med      predniSONE (DELTASONE) 5 MG tablet Take 5 mg by mouth once daily., Starting 7/26/2016, Until Discontinued, Historical Med      spironolactone (ALDACTONE) 25 MG tablet Take 1 tablet (25 mg total) by mouth once daily., Starting 8/25/2016, Until Fri 8/25/17, Normal      VENTOLIN HFA 90 mcg/actuation inhaler INHALE 1 PUFF Q 4 H PRF WHEEZING, Historical Med           Bettie Tsai MD  Comprehensive Stroke Center  Department of Vascular Neurology   [facility]

## 2017-12-14 NOTE — PROGRESS NOTES
Patient with history of substance abuse -- cocaine, marijuana, etoh. Primary coverage is Medicaid [employer] [place] [employer]. Not a candidate for DMFP.    Removed from hf list.

## 2017-12-14 NOTE — PLAN OF CARE
3:44 PM  SW arranged transport with Medicaid van to bring patient home. Transport is arranged for 6:45 PM. Confirmation number for transport is [id number]. HUSSEIN rocha.     Pat Enriquez, JEFFREY  [facility]  Ext. [phone]

## 2017-12-14 NOTE — ASSESSMENT & PLAN NOTE
58 yo M with PMHx CHF and cocaine abuse transferred for new R MCA syndrome. LKN >4.5 hours when evaluated so no alteplase administered. [place] [facility]A MP showing R MCA LVO so he was brought to IR for thrombectomy. Suspect A fib as etiology given severe CHF, enlarged LA, and cardiology reporting intermittent SVT per his ICD interrogation.     -Antithrombotics for secondary stroke prevention: Antiplatelets:  ASA 81 mg po qd.  Apixaban 5 mg po qd, starting today.  -Statins for secondary stroke prevention/HLD, if present: Atorvastatin 40 mg po qd (LDL invalid as TGs > 479)  -Aggressive risk factor modification: HLD, Diet, Exercise, drug abuse  -Rehab Efforts: PT/OT/PM&R/SLP  -Diagnostics:  Repeat 2D Echo per Cardiology recs.  Complete.  Will start anticoagulation per their recommendations.  VTE Prophylaxis: Heparin 5000 U q8h

## 2017-12-15 ENCOUNTER — TELEPHONE (OUTPATIENT)
Dept: NEUROSURGERY | Facility: HOSPITAL | Age: 57
End: 2017-12-15

## 2017-12-15 NOTE — PHYSICIAN QUERY
"PT Name: Jana Stewart  MR #: [id number]    Physician Query Form - Nutrition Clarification     CDS: Inez Benz RN, CCDS       Contact information: [email or web address]    This form is a permanent document in the medical record.     Query Date: December 15, 2017    By submitting this query, we are merely seeking further clarification of documentation.. Please utilize your independent clinical judgment when addressing the question(s) below.    The Medical record contains the following:   Indicators  Supporting Clinical Findings Location in Medical Record   X % of Estimated Energy Intake over a time frame from p.o., TF, or TPN % Intake of Estimated Energy Needs: 50 - 75 %  % Meal Intake: 75%  12/10-Nutrition PN    Weight Status over a time frame      Subcutaneous Fat and/or Muscle Loss      Fluid Accumulation or Edema      Reduced  Strength     X Wt / BMI / Usual Body Weight Height: 5' 7.79" (172.2 cm)  Weight Method: Bed Scale  Weight: 70.3 kg (154 lb 15.7 oz)  BMI (Calculated): 23.8 12/10-Nutrition PN    Delayed Wound Healing / Failure to Thrive     X Acute or Chronic Illness Malnutrition, Embolic stroke involving right middle cerebral artery, Cytotoxic cerebral edema, Acute on chronic combined systolic and diastolic congestive heart failure, ETOHism, Cocaine dependence with intoxication, Mild tetrahydrocannabinol (THC) abuse 12/14-D/C Summary    Medication     X Treatment Recommendation/Intervention:  1. Rec Cardiac diet, texture per SLP.   2. Encourage PO intake.   3. RD to follow.  SLP rec puree diet with thin liquids, no straws. 12/10-Nutrition PN   X    X Other Malnutrition--nutriition consult    Nutrition Problem  Swallowing difficulty 12/11-NCC PN    12/10-Nutrition PN     AND / ASPEN Clinical Characteristics (October 2011)  A minimum of two characteristics is recommended for diagnosing either moderate or severe malnutrition   Mild Malnutrition Moderate Malnutrition Severe Malnutrition   Energy " Intake from p.o., TF or TPN. < 75% intake of estimated energy needs for less than 7 days < 75% intake of estimated energy needs for greater than 7 days < 50% intake of estimated energy needs for > 5 days   Weight Loss 1-2% in 1 month  5% in 3 months  7.5% in 6 months  10% in 1 year 1-2 % in 1 week  5% in 1 month  7.5% in 3 months  10% in 6 months  20% in 1 year > 2% in 1 week  > 5% in 1 month  > 7.5% in 3 months  > 10% in 6 months  > 20% in 1 year   Physical Findings     None *Mild subcutaneous fat and/or muscle loss  *Mild fluid accumulation  *Stage II decubitus  *Surgical wound or non-healing wound *Mod/severe subcutaneous fat and/or muscle loss  *Mod/severe fluid accumulation  *Stage III or IV decubitus  *Non-healing surgical wound     Provider, please specify diagnosis or diagnoses associated with above clinical findings.    [ ] Mild Protein-Calorie Malnutrition  [ ] Moderate Protein-Calorie Malnutrition  [ ] Other Nutritional Diagnosis (please specify): ____________________________________  [xxx ] Clinically Undetermined    Please document in your progress notes daily for the duration of treatment until resolved and include in your discharge summary.

## 2017-12-15 NOTE — TELEPHONE ENCOUNTER
Attempted to contact patient via number on file.  No answer.  Busy signal reached.  Will try again later.

## 2017-12-18 ENCOUNTER — TELEPHONE (OUTPATIENT)
Dept: NEUROSURGERY | Facility: HOSPITAL | Age: 57
End: 2017-12-18

## 2017-12-19 ENCOUNTER — TELEPHONE (OUTPATIENT)
Dept: NEUROSURGERY | Facility: HOSPITAL | Age: 57
End: 2017-12-19

## 2017-12-19 NOTE — TELEPHONE ENCOUNTER
Attempted to contact patient via number on file.  No answer.  Busy signal received.  Third unsuccessful attempt.

## 2017-12-31 ENCOUNTER — HISTORICAL (OUTPATIENT)
Dept: ADMINISTRATIVE | Facility: HOSPITAL | Age: 57
End: 2017-12-31

## 2017-12-31 LAB
ABS NEUT (OLG): 3.31 X10(3)/MCL (ref 2.1–9.2)
ALBUMIN SERPL-MCNC: 3.5 GM/DL (ref 3.4–5)
ALBUMIN/GLOB SERPL: 1 RATIO (ref 1–2)
ALP SERPL-CCNC: 59 UNIT/L (ref 45–117)
ALT SERPL-CCNC: 33 UNIT/L (ref 12–78)
AMPHET UR QL SCN: NEGATIVE
APTT PPP: 32.6 SECOND(S) (ref 23.3–37)
AST SERPL-CCNC: 58 UNIT/L (ref 15–37)
BARBITURATE SCN PRESENT UR: NEGATIVE
BASOPHILS # BLD AUTO: 0.05 X10(3)/MCL
BASOPHILS NFR BLD AUTO: 1 % (ref 0–1)
BENZODIAZ UR QL SCN: NEGATIVE
BILIRUB SERPL-MCNC: 0.9 MG/DL (ref 0.2–1)
BILIRUBIN DIRECT+TOT PNL SERPL-MCNC: 0.3 MG/DL
BILIRUBIN DIRECT+TOT PNL SERPL-MCNC: 0.6 MG/DL
BUN SERPL-MCNC: 21 MG/DL (ref 7–18)
CALCIUM SERPL-MCNC: 8.7 MG/DL (ref 8.5–10.1)
CANNABINOIDS UR QL SCN: POSITIVE
CHLORIDE SERPL-SCNC: 106 MMOL/L (ref 98–107)
CK MB SERPL-MCNC: 4.1 NG/ML (ref 1–3.6)
CK MB SERPL-MCNC: 5.5 NG/ML (ref 1–3.6)
CK MB SERPL-MCNC: 7.2 NG/ML (ref 1–3.6)
CK SERPL-CCNC: 670 UNIT/L (ref 39–308)
CK SERPL-CCNC: 759 UNIT/L (ref 39–308)
CK SERPL-CCNC: 925 UNIT/L (ref 39–308)
CO2 SERPL-SCNC: 28 MMOL/L (ref 21–32)
COCAINE UR QL SCN: POSITIVE
CREAT SERPL-MCNC: 1.4 MG/DL (ref 0.6–1.3)
EOSINOPHIL # BLD AUTO: 0.06 10*3/UL
EOSINOPHIL NFR BLD AUTO: 1 % (ref 0–5)
ERYTHROCYTE [DISTWIDTH] IN BLOOD BY AUTOMATED COUNT: 20.8 % (ref 11.5–14.5)
FERRITIN SERPL-MCNC: 36.1 NG/ML (ref 26–388)
FOLATE SERPL-MCNC: 22.4 NG/ML (ref 3.1–17.5)
GLOBULIN SER-MCNC: 4.1 GM/ML (ref 2.3–3.5)
GLUCOSE SERPL-MCNC: 88 MG/DL (ref 74–106)
HCT VFR BLD AUTO: 31.7 % (ref 40–51)
HGB BLD-MCNC: 10.3 GM/DL (ref 13.5–17.5)
IMM GRANULOCYTES # BLD AUTO: 0.01 10*3/UL
IMM GRANULOCYTES NFR BLD AUTO: 0 %
INR PPP: 1.19 (ref 0.9–1.2)
IRON SATN MFR SERPL: 11.2 % (ref 15–50)
IRON SERPL-MCNC: 39 MCG/DL (ref 65–175)
LYMPHOCYTES # BLD AUTO: 1.67 X10(3)/MCL
LYMPHOCYTES NFR BLD AUTO: 30 % (ref 15–40)
MCH RBC QN AUTO: 28.4 PG (ref 26–34)
MCHC RBC AUTO-ENTMCNC: 32.5 GM/DL (ref 31–37)
MCV RBC AUTO: 87.3 FL (ref 80–100)
MONOCYTES # BLD AUTO: 0.43 X10(3)/MCL
MONOCYTES NFR BLD AUTO: 8 % (ref 4–12)
NEUTROPHILS # BLD AUTO: 3.31 X10(3)/MCL
NEUTROPHILS NFR BLD AUTO: 60 X10(3)/MCL
OPIATES UR QL SCN: NEGATIVE
PCP UR QL: NEGATIVE
PH UR STRIP.AUTO: 6 [PH] (ref 5–8)
PLATELET # BLD AUTO: 441 X10(3)/MCL (ref 130–400)
PMV BLD AUTO: 10.2 FL (ref 7.4–10.4)
POC TROPONIN: 0.04 NG/ML (ref 0–0.08)
POC TROPONIN: 0.05 NG/ML (ref 0–0.08)
POTASSIUM SERPL-SCNC: 3.9 MMOL/L (ref 3.5–5.1)
PROT SERPL-MCNC: 7.6 GM/DL (ref 6.4–8.2)
PROTHROMBIN TIME: 14.9 SECOND(S) (ref 11.9–14.4)
RBC # BLD AUTO: 3.63 X10(6)/MCL (ref 4.5–5.9)
SODIUM SERPL-SCNC: 141 MMOL/L (ref 136–145)
TEMPERATURE, URINE (OHS): 21.8 DEGC (ref 20–25)
TIBC SERPL-MCNC: 348 MCG/DL (ref 250–450)
TRANSFERRIN SERPL-MCNC: 260 MG/DL (ref 200–360)
TROPONIN I SERPL-MCNC: 0.08 NG/ML (ref 0–0.05)
TROPONIN I SERPL-MCNC: 0.08 NG/ML (ref 0–0.05)
VIT B12 SERPL-MCNC: 567 PG/ML (ref 193–986)
WBC # SPEC AUTO: 5.5 X10(3)/MCL (ref 4.5–11)

## 2018-01-01 LAB
ABS NEUT (OLG): 3.31 X10(3)/MCL (ref 2.1–9.2)
BASOPHILS # BLD AUTO: 0.02 X10(3)/MCL
BASOPHILS NFR BLD AUTO: 0 % (ref 0–1)
BUN SERPL-MCNC: 19 MG/DL (ref 7–18)
CALCIUM SERPL-MCNC: 8.1 MG/DL (ref 8.5–10.1)
CHLORIDE SERPL-SCNC: 108 MMOL/L (ref 98–107)
CHOLEST SERPL-MCNC: 127 MG/DL
CHOLEST/HDLC SERPL: 2.7 {RATIO} (ref 0–5)
CK MB SERPL-MCNC: 2.8 NG/ML (ref 1–3.6)
CK SERPL-CCNC: 543 UNIT/L (ref 39–308)
CO2 SERPL-SCNC: 26 MMOL/L (ref 21–32)
CREAT SERPL-MCNC: 1.4 MG/DL (ref 0.6–1.3)
EOSINOPHIL # BLD AUTO: 0.06 X10(3)/MCL
EOSINOPHIL NFR BLD AUTO: 1 % (ref 0–5)
ERYTHROCYTE [DISTWIDTH] IN BLOOD BY AUTOMATED COUNT: 20.7 % (ref 11.5–14.5)
GLUCOSE SERPL-MCNC: 96 MG/DL (ref 74–106)
HCT VFR BLD AUTO: 28.6 % (ref 40–51)
HDLC SERPL-MCNC: 47 MG/DL
HGB BLD-MCNC: 9.5 GM/DL (ref 13.5–17.5)
IMM GRANULOCYTES # BLD AUTO: 0.01 10*3/UL
IMM GRANULOCYTES NFR BLD AUTO: 0 %
LDLC SERPL CALC-MCNC: 64 MG/DL (ref 0–130)
LYMPHOCYTES # BLD AUTO: 1.54 X10(3)/MCL
LYMPHOCYTES NFR BLD AUTO: 28 % (ref 15–40)
MCH RBC QN AUTO: 29.1 PG (ref 26–34)
MCHC RBC AUTO-ENTMCNC: 33.2 GM/DL (ref 31–37)
MCV RBC AUTO: 87.5 FL (ref 80–100)
MONOCYTES # BLD AUTO: 0.55 X10(3)/MCL
MONOCYTES NFR BLD AUTO: 10 % (ref 4–12)
NEUTROPHILS # BLD AUTO: 3.31 X10(3)/MCL
NEUTROPHILS NFR BLD AUTO: 60 X10(3)/MCL
PLATELET # BLD AUTO: 363 X10(3)/MCL (ref 130–400)
PMV BLD AUTO: 9.2 FL (ref 7.4–10.4)
POTASSIUM SERPL-SCNC: 4.2 MMOL/L (ref 3.5–5.1)
RBC # BLD AUTO: 3.27 X10(6)/MCL (ref 4.5–5.9)
SODIUM SERPL-SCNC: 142 MMOL/L (ref 136–145)
TRIGL SERPL-MCNC: 82 MG/DL
TROPONIN I SERPL-MCNC: 0.07 NG/ML (ref 0–0.05)
VLDLC SERPL CALC-MCNC: 16 MG/DL
WBC # SPEC AUTO: 5.5 X10(3)/MCL (ref 4.5–11)

## 2018-01-02 LAB
ABS NEUT (OLG): 3.83 X10(3)/MCL (ref 2.1–9.2)
BASOPHILS # BLD AUTO: 0.06 X10(3)/MCL
BASOPHILS NFR BLD AUTO: 1 % (ref 0–1)
BUN SERPL-MCNC: 19 MG/DL (ref 7–18)
CALCIUM SERPL-MCNC: 8.8 MG/DL (ref 8.5–10.1)
CHLORIDE SERPL-SCNC: 102 MMOL/L (ref 98–107)
CO2 SERPL-SCNC: 28 MMOL/L (ref 21–32)
CREAT SERPL-MCNC: 1.3 MG/DL (ref 0.6–1.3)
EOSINOPHIL # BLD AUTO: 0.08 X10(3)/MCL
EOSINOPHIL NFR BLD AUTO: 1 % (ref 0–5)
ERYTHROCYTE [DISTWIDTH] IN BLOOD BY AUTOMATED COUNT: 20.5 % (ref 11.5–14.5)
GLUCOSE SERPL-MCNC: 103 MG/DL (ref 74–106)
HCT VFR BLD AUTO: 34.8 % (ref 40–51)
HGB BLD-MCNC: 11.2 GM/DL (ref 13.5–17.5)
IMM GRANULOCYTES # BLD AUTO: 0.01 10*3/UL
IMM GRANULOCYTES NFR BLD AUTO: 0 %
LYMPHOCYTES # BLD AUTO: 2.51 X10(3)/MCL
LYMPHOCYTES NFR BLD AUTO: 37 % (ref 15–40)
MCH RBC QN AUTO: 28.6 PG (ref 26–34)
MCHC RBC AUTO-ENTMCNC: 32.2 GM/DL (ref 31–37)
MCV RBC AUTO: 88.8 FL (ref 80–100)
MONOCYTES # BLD AUTO: 0.35 X10(3)/MCL
MONOCYTES NFR BLD AUTO: 5 % (ref 4–12)
NEUTROPHILS # BLD AUTO: 3.83 X10(3)/MCL
NEUTROPHILS NFR BLD AUTO: 56 X10(3)/MCL
PLATELET # BLD AUTO: 405 X10(3)/MCL (ref 130–400)
PMV BLD AUTO: 10.2 FL (ref 7.4–10.4)
POTASSIUM SERPL-SCNC: 3.9 MMOL/L (ref 3.5–5.1)
RBC # BLD AUTO: 3.92 X10(6)/MCL (ref 4.5–5.9)
SODIUM SERPL-SCNC: 138 MMOL/L (ref 136–145)
WBC # SPEC AUTO: 6.8 X10(3)/MCL (ref 4.5–11)

## 2018-01-04 ENCOUNTER — HISTORICAL (OUTPATIENT)
Dept: ADMINISTRATIVE | Facility: HOSPITAL | Age: 58
End: 2018-01-04

## 2018-01-04 LAB
ABS NEUT (OLG): 3.6 X10(3)/MCL (ref 2.1–9.2)
ALBUMIN SERPL-MCNC: 3.4 GM/DL (ref 3.4–5)
ALBUMIN/GLOB SERPL: 1 RATIO (ref 1–2)
ALP SERPL-CCNC: 67 UNIT/L (ref 45–117)
ALT SERPL-CCNC: 25 UNIT/L (ref 12–78)
AMPHET UR QL SCN: NEGATIVE
APPEARANCE, UA: CLEAR
AST SERPL-CCNC: 25 UNIT/L (ref 15–37)
BACTERIA #/AREA URNS AUTO: ABNORMAL /[HPF]
BARBITURATE SCN PRESENT UR: NEGATIVE
BASOPHILS # BLD AUTO: 0.03 X10(3)/MCL
BASOPHILS NFR BLD AUTO: 0 % (ref 0–1)
BENZODIAZ UR QL SCN: NEGATIVE
BILIRUB SERPL-MCNC: 0.7 MG/DL (ref 0.2–1)
BILIRUB UR QL STRIP: NEGATIVE
BILIRUBIN DIRECT+TOT PNL SERPL-MCNC: 0.2 MG/DL
BILIRUBIN DIRECT+TOT PNL SERPL-MCNC: 0.5 MG/DL
BUN SERPL-MCNC: 22 MG/DL (ref 7–18)
CALCIUM SERPL-MCNC: 8.6 MG/DL (ref 8.5–10.1)
CANNABINOIDS UR QL SCN: POSITIVE
CHLORIDE SERPL-SCNC: 104 MMOL/L (ref 98–107)
CK MB SERPL-MCNC: 2 NG/ML (ref 1–3.6)
CK SERPL-CCNC: 172 UNIT/L (ref 39–308)
CO2 SERPL-SCNC: 24 MMOL/L (ref 21–32)
COCAINE UR QL SCN: POSITIVE
COLOR UR: YELLOW
CREAT SERPL-MCNC: 1.5 MG/DL (ref 0.6–1.3)
EOSINOPHIL # BLD AUTO: 0.04 X10(3)/MCL
EOSINOPHIL NFR BLD AUTO: 1 % (ref 0–5)
ERYTHROCYTE [DISTWIDTH] IN BLOOD BY AUTOMATED COUNT: 20 % (ref 11.5–14.5)
GLOBULIN SER-MCNC: 4.2 GM/ML (ref 2.3–3.5)
GLUCOSE (UA): NORMAL
GLUCOSE SERPL-MCNC: 93 MG/DL (ref 74–106)
HCT VFR BLD AUTO: 33 % (ref 40–51)
HGB BLD-MCNC: 10.9 GM/DL (ref 13.5–17.5)
HGB UR QL STRIP: NEGATIVE
HYALINE CASTS #/AREA URNS LPF: ABNORMAL /[LPF]
IMM GRANULOCYTES # BLD AUTO: 0.02 10*3/UL
IMM GRANULOCYTES NFR BLD AUTO: 0 %
KETONES UR QL STRIP: ABNORMAL
LEUKOCYTE ESTERASE UR QL STRIP: NEGATIVE
LYMPHOCYTES # BLD AUTO: 2.57 X10(3)/MCL
LYMPHOCYTES NFR BLD AUTO: 38 % (ref 15–40)
MAGNESIUM SERPL-MCNC: 2.1 MG/DL (ref 1.8–2.4)
MCH RBC QN AUTO: 28.6 PG (ref 26–34)
MCHC RBC AUTO-ENTMCNC: 33 GM/DL (ref 31–37)
MCV RBC AUTO: 86.6 FL (ref 80–100)
MONOCYTES # BLD AUTO: 0.44 X10(3)/MCL
MONOCYTES NFR BLD AUTO: 7 % (ref 4–12)
NEUTROPHILS # BLD AUTO: 3.6 X10(3)/MCL
NEUTROPHILS NFR BLD AUTO: 54 X10(3)/MCL
NITRITE UR QL STRIP: ABNORMAL
OPIATES UR QL SCN: NEGATIVE
PCP UR QL: NEGATIVE
PH UR STRIP.AUTO: 5.5 [PH] (ref 5–8)
PH UR STRIP: 5.5 [PH] (ref 4.5–8)
PLATELET # BLD AUTO: 353 X10(3)/MCL (ref 130–400)
PMV BLD AUTO: 9.5 FL (ref 7.4–10.4)
POC TROPONIN: 0.04 NG/ML (ref 0–0.08)
POTASSIUM SERPL-SCNC: 4.1 MMOL/L (ref 3.5–5.1)
PROT SERPL-MCNC: 7.6 GM/DL (ref 6.4–8.2)
PROT UR QL STRIP: 20 MG/DL
RBC # BLD AUTO: 3.81 X10(6)/MCL (ref 4.5–5.9)
RBC #/AREA URNS AUTO: ABNORMAL /[HPF]
SODIUM SERPL-SCNC: 139 MMOL/L (ref 136–145)
SP GR UR STRIP: 1.02 (ref 1–1.03)
SQUAMOUS #/AREA URNS LPF: ABNORMAL /[LPF]
TEMPERATURE, URINE (OHS): 23 DEGC (ref 20–25)
UROBILINOGEN UR STRIP-ACNC: 3 MG/DL
WBC # SPEC AUTO: 6.7 X10(3)/MCL (ref 4.5–11)
WBC #/AREA URNS AUTO: ABNORMAL /HPF

## 2018-01-05 ENCOUNTER — HISTORICAL (OUTPATIENT)
Dept: ADMINISTRATIVE | Facility: HOSPITAL | Age: 58
End: 2018-01-05

## 2018-01-05 LAB
ABS NEUT (OLG): 2.83 X10(3)/MCL (ref 2.1–9.2)
ABS NEUT (OLG): 3.86 X10(3)/MCL (ref 2.1–9.2)
ALBUMIN SERPL-MCNC: 3.1 GM/DL (ref 3.4–5)
ALBUMIN SERPL-MCNC: 3.6 GM/DL (ref 3.4–5)
ALBUMIN/GLOB SERPL: 1 RATIO (ref 1–2)
ALBUMIN/GLOB SERPL: 1 RATIO (ref 1–2)
ALP SERPL-CCNC: 64 UNIT/L (ref 45–117)
ALP SERPL-CCNC: 72 UNIT/L (ref 45–117)
ALT SERPL-CCNC: 23 UNIT/L (ref 12–78)
ALT SERPL-CCNC: 26 UNIT/L (ref 12–78)
APTT PPP: 31.7 SECOND(S) (ref 23.3–37)
AST SERPL-CCNC: 20 UNIT/L (ref 15–37)
AST SERPL-CCNC: 25 UNIT/L (ref 15–37)
BASOPHILS # BLD AUTO: 0.03 X10(3)/MCL
BASOPHILS # BLD AUTO: 0.04 X10(3)/MCL
BASOPHILS NFR BLD AUTO: 0 % (ref 0–1)
BASOPHILS NFR BLD AUTO: 1 % (ref 0–1)
BILIRUB SERPL-MCNC: 0.8 MG/DL (ref 0.2–1)
BILIRUB SERPL-MCNC: 1.1 MG/DL (ref 0.2–1)
BILIRUBIN DIRECT+TOT PNL SERPL-MCNC: 0.2 MG/DL
BILIRUBIN DIRECT+TOT PNL SERPL-MCNC: 0.3 MG/DL
BILIRUBIN DIRECT+TOT PNL SERPL-MCNC: 0.6 MG/DL
BILIRUBIN DIRECT+TOT PNL SERPL-MCNC: 0.8 MG/DL
BUN SERPL-MCNC: 23 MG/DL (ref 7–18)
BUN SERPL-MCNC: 24 MG/DL (ref 7–18)
CALCIUM SERPL-MCNC: 8.5 MG/DL (ref 8.5–10.1)
CALCIUM SERPL-MCNC: 9 MG/DL (ref 8.5–10.1)
CHLORIDE SERPL-SCNC: 103 MMOL/L (ref 98–107)
CHLORIDE SERPL-SCNC: 104 MMOL/L (ref 98–107)
CK MB SERPL-MCNC: 1.9 NG/ML (ref 1–3.6)
CK SERPL-CCNC: 141 UNIT/L (ref 39–308)
CO2 SERPL-SCNC: 26 MMOL/L (ref 21–32)
CO2 SERPL-SCNC: 27 MMOL/L (ref 21–32)
CREAT SERPL-MCNC: 1.6 MG/DL (ref 0.6–1.3)
CREAT SERPL-MCNC: 1.6 MG/DL (ref 0.6–1.3)
EOSINOPHIL # BLD AUTO: 0.04 X10(3)/MCL
EOSINOPHIL # BLD AUTO: 0.06 X10(3)/MCL
EOSINOPHIL NFR BLD AUTO: 1 % (ref 0–5)
EOSINOPHIL NFR BLD AUTO: 1 % (ref 0–5)
ERYTHROCYTE [DISTWIDTH] IN BLOOD BY AUTOMATED COUNT: 20 % (ref 11.5–14.5)
ERYTHROCYTE [DISTWIDTH] IN BLOOD BY AUTOMATED COUNT: 20.2 % (ref 11.5–14.5)
GLOBULIN SER-MCNC: 4 GM/ML (ref 2.3–3.5)
GLOBULIN SER-MCNC: 4.4 GM/ML (ref 2.3–3.5)
GLUCOSE SERPL-MCNC: 111 MG/DL (ref 74–106)
GLUCOSE SERPL-MCNC: 93 MG/DL (ref 74–106)
HCT VFR BLD AUTO: 31.2 % (ref 40–51)
HCT VFR BLD AUTO: 35.5 % (ref 40–51)
HGB BLD-MCNC: 10.2 GM/DL (ref 13.5–17.5)
HGB BLD-MCNC: 11.7 GM/DL (ref 13.5–17.5)
IMM GRANULOCYTES # BLD AUTO: 0.02 10*3/UL
IMM GRANULOCYTES NFR BLD AUTO: 0 %
INR PPP: 1.13 (ref 0.9–1.2)
LYMPHOCYTES # BLD AUTO: 1.8 X10(3)/MCL
LYMPHOCYTES # BLD AUTO: 2.03 X10(3)/MCL
LYMPHOCYTES NFR BLD AUTO: 29 % (ref 15–40)
LYMPHOCYTES NFR BLD AUTO: 38 % (ref 15–40)
MCH RBC QN AUTO: 28.9 PG (ref 26–34)
MCH RBC QN AUTO: 29 PG (ref 26–34)
MCHC RBC AUTO-ENTMCNC: 32.7 GM/DL (ref 31–37)
MCHC RBC AUTO-ENTMCNC: 33 GM/DL (ref 31–37)
MCV RBC AUTO: 88.1 FL (ref 80–100)
MCV RBC AUTO: 88.4 FL (ref 80–100)
MONOCYTES # BLD AUTO: 0.39 X10(3)/MCL
MONOCYTES # BLD AUTO: 0.47 X10(3)/MCL
MONOCYTES NFR BLD AUTO: 7 % (ref 4–12)
MONOCYTES NFR BLD AUTO: 8 % (ref 4–12)
NEUTROPHILS # BLD AUTO: 2.83 X10(3)/MCL
NEUTROPHILS # BLD AUTO: 3.86 X10(3)/MCL
NEUTROPHILS NFR BLD AUTO: 53 X10(3)/MCL
NEUTROPHILS NFR BLD AUTO: 62 X10(3)/MCL
PLATELET # BLD AUTO: 277 X10(3)/MCL (ref 130–400)
PLATELET # BLD AUTO: 351 X10(3)/MCL (ref 130–400)
PMV BLD AUTO: 9.2 FL (ref 7.4–10.4)
PMV BLD AUTO: 9.9 FL (ref 7.4–10.4)
POC TROPONIN: 0.04 NG/ML (ref 0–0.08)
POTASSIUM SERPL-SCNC: 3.8 MMOL/L (ref 3.5–5.1)
POTASSIUM SERPL-SCNC: 4.5 MMOL/L (ref 3.5–5.1)
PROT SERPL-MCNC: 7.1 GM/DL (ref 6.4–8.2)
PROT SERPL-MCNC: 8 GM/DL (ref 6.4–8.2)
PROTHROMBIN TIME: 14.3 SECOND(S) (ref 11.9–14.4)
RBC # BLD AUTO: 3.53 X10(6)/MCL (ref 4.5–5.9)
RBC # BLD AUTO: 4.03 X10(6)/MCL (ref 4.5–5.9)
SODIUM SERPL-SCNC: 137 MMOL/L (ref 136–145)
SODIUM SERPL-SCNC: 138 MMOL/L (ref 136–145)
TROPONIN I SERPL-MCNC: 0.04 NG/ML (ref 0–0.05)
TROPONIN I SERPL-MCNC: 0.04 NG/ML (ref 0–0.05)
TROPONIN I SERPL-MCNC: 0.05 NG/ML (ref 0–0.05)
WBC # SPEC AUTO: 5.3 X10(3)/MCL (ref 4.5–11)
WBC # SPEC AUTO: 6.2 X10(3)/MCL (ref 4.5–11)

## 2018-01-06 LAB
MAGNESIUM SERPL-MCNC: 2.1 MG/DL (ref 1.8–2.4)
PHOSPHATE SERPL-MCNC: 3.7 MG/DL (ref 2.5–4.9)

## 2018-01-07 LAB
ABS NEUT (OLG): 15.16 X10(3)/MCL (ref 2.1–9.2)
ABS NEUT (OLG): 16.26 X10(3)/MCL
ALBUMIN SERPL-MCNC: 2.8 GM/DL (ref 3.4–5)
ALBUMIN/GLOB SERPL: 1 RATIO (ref 1–2)
ALP SERPL-CCNC: 52 UNIT/L (ref 45–117)
ALT SERPL-CCNC: 17 UNIT/L (ref 12–78)
APPEARANCE, UA: CLEAR
AST SERPL-CCNC: 15 UNIT/L (ref 15–37)
BACTERIA #/AREA URNS AUTO: ABNORMAL /[HPF]
BASOPHILS # BLD AUTO: 0.04 X10(3)/MCL
BASOPHILS NFR BLD AUTO: 0 % (ref 0–1)
BASOPHILS NFR BLD MANUAL: 0 %
BILIRUB SERPL-MCNC: 1 MG/DL (ref 0.2–1)
BILIRUB UR QL STRIP: NEGATIVE
BILIRUBIN DIRECT+TOT PNL SERPL-MCNC: 0.3 MG/DL
BILIRUBIN DIRECT+TOT PNL SERPL-MCNC: 0.7 MG/DL
BUN SERPL-MCNC: 18 MG/DL (ref 7–18)
CALCIUM SERPL-MCNC: 7.8 MG/DL (ref 8.5–10.1)
CHLORIDE SERPL-SCNC: 100 MMOL/L (ref 98–107)
CO2 SERPL-SCNC: 27 MMOL/L (ref 21–32)
COLOR UR: YELLOW
CREAT SERPL-MCNC: 1.5 MG/DL (ref 0.6–1.3)
EOSINOPHIL # BLD AUTO: 0.03 X10(3)/MCL
EOSINOPHIL NFR BLD AUTO: 0 % (ref 0–5)
EOSINOPHIL NFR BLD MANUAL: 0 %
ERYTHROCYTE [DISTWIDTH] IN BLOOD BY AUTOMATED COUNT: 19.9 % (ref 11.5–14.5)
ERYTHROCYTE [DISTWIDTH] IN BLOOD BY AUTOMATED COUNT: 19.9 % (ref 11.5–14.5)
GLOBULIN SER-MCNC: 3.6 GM/ML (ref 2.3–3.5)
GLUCOSE (UA): NORMAL
GLUCOSE SERPL-MCNC: 90 MG/DL (ref 74–106)
GRANULOCYTES NFR BLD MANUAL: 90 % (ref 43–75)
HCT VFR BLD AUTO: 28.5 % (ref 40–51)
HCT VFR BLD AUTO: 29.5 % (ref 40–51)
HGB BLD-MCNC: 9.3 GM/DL (ref 13.5–17.5)
HGB BLD-MCNC: 9.5 GM/DL (ref 13.5–17.5)
HGB UR QL STRIP: NEGATIVE
HYALINE CASTS #/AREA URNS LPF: ABNORMAL /[LPF]
IMM GRANULOCYTES # BLD AUTO: 0.08 10*3/UL
IMM GRANULOCYTES NFR BLD AUTO: 0 %
KETONES UR QL STRIP: NEGATIVE
LEUKOCYTE ESTERASE UR QL STRIP: 75 LEU/UL
LYMPHOCYTES # BLD AUTO: 0.84 X10(3)/MCL
LYMPHOCYTES NFR BLD AUTO: 5 % (ref 15–40)
LYMPHOCYTES NFR BLD MANUAL: 1 % (ref 20.5–51.1)
MCH RBC QN AUTO: 29 PG (ref 26–34)
MCH RBC QN AUTO: 29.2 PG (ref 26–34)
MCHC RBC AUTO-ENTMCNC: 32.2 GM/DL (ref 31–37)
MCHC RBC AUTO-ENTMCNC: 32.6 GM/DL (ref 31–37)
MCV RBC AUTO: 89.6 FL (ref 80–100)
MCV RBC AUTO: 89.9 FL (ref 80–100)
MONOCYTES # BLD AUTO: 0.89 X10(3)/MCL
MONOCYTES NFR BLD AUTO: 5 % (ref 4–12)
MONOCYTES NFR BLD MANUAL: 4 % (ref 2–9)
NEUTROPHILS # BLD AUTO: 15.16 X10(3)/MCL
NEUTROPHILS NFR BLD AUTO: 89 X10(3)/MCL
NEUTS BAND NFR BLD MANUAL: 5 % (ref 0–10)
NITRITE UR QL STRIP: NEGATIVE
PH UR STRIP: 5 [PH] (ref 4.5–8)
PLATELET # BLD AUTO: 217 X10(3)/MCL (ref 130–400)
PLATELET # BLD AUTO: 224 X10(3)/MCL (ref 130–400)
PLATELET # BLD EST: NORMAL 10*3/UL
PMV BLD AUTO: 10.2 FL (ref 7.4–10.4)
PMV BLD AUTO: 10.2 FL (ref 7.4–10.4)
POTASSIUM SERPL-SCNC: 3.6 MMOL/L (ref 3.5–5.1)
PROT SERPL-MCNC: 6.4 GM/DL (ref 6.4–8.2)
PROT UR QL STRIP: NEGATIVE
RBC # BLD AUTO: 3.18 X10(6)/MCL (ref 4.5–5.9)
RBC # BLD AUTO: 3.28 X10(6)/MCL (ref 4.5–5.9)
RBC #/AREA URNS AUTO: ABNORMAL /[HPF]
RBC MORPH BLD: NORMAL
SODIUM SERPL-SCNC: 137 MMOL/L (ref 136–145)
SP GR UR STRIP: 1.01 (ref 1–1.03)
SQUAMOUS #/AREA URNS LPF: ABNORMAL /[LPF]
UROBILINOGEN UR STRIP-ACNC: 2 MG/DL
WBC # SPEC AUTO: 17 X10(3)/MCL (ref 4.5–11)
WBC # SPEC AUTO: 18.3 X10(3)/MCL (ref 4.5–11)
WBC #/AREA URNS AUTO: ABNORMAL /HPF

## 2018-01-08 LAB
ABS NEUT (OLG): 8.26 X10(3)/MCL (ref 2.1–9.2)
BASOPHILS # BLD AUTO: 0.03 X10(3)/MCL
BASOPHILS NFR BLD AUTO: 0 % (ref 0–1)
BUN SERPL-MCNC: 20 MG/DL (ref 7–18)
CALCIUM SERPL-MCNC: 8.1 MG/DL (ref 8.5–10.1)
CHLORIDE SERPL-SCNC: 106 MMOL/L (ref 98–107)
CO2 SERPL-SCNC: 25 MMOL/L (ref 21–32)
CREAT SERPL-MCNC: 1.6 MG/DL (ref 0.6–1.3)
EOSINOPHIL # BLD AUTO: 0.09 10*3/UL
EOSINOPHIL NFR BLD AUTO: 1 % (ref 0–5)
ERYTHROCYTE [DISTWIDTH] IN BLOOD BY AUTOMATED COUNT: 19.9 % (ref 11.5–14.5)
GLUCOSE SERPL-MCNC: 89 MG/DL (ref 74–106)
HCT VFR BLD AUTO: 28.2 % (ref 40–51)
HGB BLD-MCNC: 9.2 GM/DL (ref 13.5–17.5)
IMM GRANULOCYTES # BLD AUTO: 0.04 10*3/UL
IMM GRANULOCYTES NFR BLD AUTO: 0 %
LYMPHOCYTES # BLD AUTO: 1.62 X10(3)/MCL
LYMPHOCYTES NFR BLD AUTO: 14 % (ref 15–40)
MCH RBC QN AUTO: 29.2 PG (ref 26–34)
MCHC RBC AUTO-ENTMCNC: 32.6 GM/DL (ref 31–37)
MCV RBC AUTO: 89.5 FL (ref 80–100)
MONOCYTES # BLD AUTO: 1.36 X10(3)/MCL
MONOCYTES NFR BLD AUTO: 12 % (ref 4–12)
NEUTROPHILS # BLD AUTO: 8.26 X10(3)/MCL
NEUTROPHILS NFR BLD AUTO: 72 X10(3)/MCL
PLATELET # BLD AUTO: 202 X10(3)/MCL (ref 130–400)
PMV BLD AUTO: 10.3 FL (ref 7.4–10.4)
POTASSIUM SERPL-SCNC: 4.4 MMOL/L (ref 3.5–5.1)
RBC # BLD AUTO: 3.15 X10(6)/MCL (ref 4.5–5.9)
SODIUM SERPL-SCNC: 140 MMOL/L (ref 136–145)
VANCOMYCIN TROUGH SERPL-MCNC: 15.2 MCG/ML (ref 10–20)
WBC # SPEC AUTO: 11.4 X10(3)/MCL (ref 4.5–11)

## 2018-01-09 LAB
ABS NEUT (OLG): 4.71 X10(3)/MCL (ref 2.1–9.2)
BASOPHILS # BLD AUTO: 0.04 X10(3)/MCL
BASOPHILS NFR BLD AUTO: 0 % (ref 0–1)
BUN SERPL-MCNC: 14 MG/DL (ref 7–18)
CALCIUM SERPL-MCNC: 8.1 MG/DL (ref 8.5–10.1)
CHLORIDE SERPL-SCNC: 104 MMOL/L (ref 98–107)
CO2 SERPL-SCNC: 28 MMOL/L (ref 21–32)
CREAT SERPL-MCNC: 1.5 MG/DL (ref 0.6–1.3)
EOSINOPHIL # BLD AUTO: 0.1 X10(3)/MCL
EOSINOPHIL NFR BLD AUTO: 1 % (ref 0–5)
ERYTHROCYTE [DISTWIDTH] IN BLOOD BY AUTOMATED COUNT: 19.8 % (ref 11.5–14.5)
GLUCOSE SERPL-MCNC: 100 MG/DL (ref 74–106)
HCT VFR BLD AUTO: 29.2 % (ref 40–51)
HGB BLD-MCNC: 9.6 GM/DL (ref 13.5–17.5)
IMM GRANULOCYTES # BLD AUTO: 0.03 10*3/UL
IMM GRANULOCYTES NFR BLD AUTO: 0 %
LYMPHOCYTES # BLD AUTO: 1.93 X10(3)/MCL
LYMPHOCYTES NFR BLD AUTO: 25 % (ref 15–40)
MCH RBC QN AUTO: 29.4 PG (ref 26–34)
MCHC RBC AUTO-ENTMCNC: 32.9 GM/DL (ref 31–37)
MCV RBC AUTO: 89.6 FL (ref 80–100)
MONOCYTES # BLD AUTO: 0.91 X10(3)/MCL
MONOCYTES NFR BLD AUTO: 12 % (ref 4–12)
NEUTROPHILS # BLD AUTO: 4.71 X10(3)/MCL
NEUTROPHILS NFR BLD AUTO: 61 X10(3)/MCL
PLATELET # BLD AUTO: 210 X10(3)/MCL (ref 130–400)
PMV BLD AUTO: 10.6 FL (ref 7.4–10.4)
POTASSIUM SERPL-SCNC: 4 MMOL/L (ref 3.5–5.1)
RBC # BLD AUTO: 3.26 X10(6)/MCL (ref 4.5–5.9)
SODIUM SERPL-SCNC: 139 MMOL/L (ref 136–145)
WBC # SPEC AUTO: 7.7 X10(3)/MCL (ref 4.5–11)

## 2018-01-10 ENCOUNTER — HISTORICAL (OUTPATIENT)
Dept: ADMINISTRATIVE | Facility: HOSPITAL | Age: 58
End: 2018-01-10

## 2018-01-10 LAB
ABS NEUT (OLG): 4.26 X10(3)/MCL (ref 2.1–9.2)
ALBUMIN SERPL-MCNC: 3.3 GM/DL (ref 3.4–5)
ALBUMIN/GLOB SERPL: 1 RATIO (ref 1–2)
ALP SERPL-CCNC: 71 UNIT/L (ref 45–117)
ALT SERPL-CCNC: 27 UNIT/L (ref 12–78)
AST SERPL-CCNC: 20 UNIT/L (ref 15–37)
BASOPHILS # BLD AUTO: 0.03 X10(3)/MCL
BASOPHILS NFR BLD AUTO: 0 % (ref 0–1)
BILIRUB SERPL-MCNC: 0.5 MG/DL (ref 0.2–1)
BILIRUBIN DIRECT+TOT PNL SERPL-MCNC: 0.1 MG/DL
BILIRUBIN DIRECT+TOT PNL SERPL-MCNC: 0.4 MG/DL
BUN SERPL-MCNC: 15 MG/DL (ref 7–18)
CALCIUM SERPL-MCNC: 8.5 MG/DL (ref 8.5–10.1)
CHLORIDE SERPL-SCNC: 106 MMOL/L (ref 98–107)
CO2 SERPL-SCNC: 30 MMOL/L (ref 21–32)
CREAT SERPL-MCNC: 1.6 MG/DL (ref 0.6–1.3)
EOSINOPHIL # BLD AUTO: 0.12 X10(3)/MCL
EOSINOPHIL NFR BLD AUTO: 2 % (ref 0–5)
ERYTHROCYTE [DISTWIDTH] IN BLOOD BY AUTOMATED COUNT: 19.8 % (ref 11.5–14.5)
GLOBULIN SER-MCNC: 4.1 GM/ML (ref 2.3–3.5)
GLUCOSE SERPL-MCNC: 74 MG/DL (ref 74–106)
HCT VFR BLD AUTO: 30.4 % (ref 40–51)
HGB BLD-MCNC: 9.9 GM/DL (ref 13.5–17.5)
IMM GRANULOCYTES # BLD AUTO: 0.04 10*3/UL
IMM GRANULOCYTES NFR BLD AUTO: 1 %
LYMPHOCYTES # BLD AUTO: 1.65 X10(3)/MCL
LYMPHOCYTES NFR BLD AUTO: 24 % (ref 15–40)
MAGNESIUM SERPL-MCNC: 1.9 MG/DL (ref 1.8–2.4)
MCH RBC QN AUTO: 29.3 PG (ref 26–34)
MCHC RBC AUTO-ENTMCNC: 32.6 GM/DL (ref 31–37)
MCV RBC AUTO: 89.9 FL (ref 80–100)
MONOCYTES # BLD AUTO: 0.69 X10(3)/MCL
MONOCYTES NFR BLD AUTO: 10 % (ref 4–12)
NEUTROPHILS # BLD AUTO: 4.26 X10(3)/MCL
NEUTROPHILS NFR BLD AUTO: 63 X10(3)/MCL
PLATELET # BLD AUTO: 227 X10(3)/MCL (ref 130–400)
PMV BLD AUTO: 10.2 FL (ref 7.4–10.4)
POC TROPONIN: 0.01 NG/ML (ref 0–0.08)
POC TROPONIN: 0.02 NG/ML (ref 0–0.08)
POTASSIUM SERPL-SCNC: 4.1 MMOL/L (ref 3.5–5.1)
PROT SERPL-MCNC: 7.4 GM/DL (ref 6.4–8.2)
RBC # BLD AUTO: 3.38 X10(6)/MCL (ref 4.5–5.9)
SODIUM SERPL-SCNC: 143 MMOL/L (ref 136–145)
TROPONIN I SERPL-MCNC: 0.05 NG/ML (ref 0–0.05)
WBC # SPEC AUTO: 6.8 X10(3)/MCL (ref 4.5–11)

## 2018-01-11 LAB
BUN SERPL-MCNC: 16 MG/DL (ref 7–18)
CALCIUM SERPL-MCNC: 8.6 MG/DL (ref 8.5–10.1)
CHLORIDE SERPL-SCNC: 107 MMOL/L (ref 98–107)
CO2 SERPL-SCNC: 28 MMOL/L (ref 21–32)
CREAT SERPL-MCNC: 1.3 MG/DL (ref 0.6–1.3)
GLUCOSE SERPL-MCNC: 96 MG/DL (ref 74–106)
POTASSIUM SERPL-SCNC: 3.9 MMOL/L (ref 3.5–5.1)
SODIUM SERPL-SCNC: 142 MMOL/L (ref 136–145)

## 2018-01-12 LAB
FINAL CULTURE: NORMAL
FINAL CULTURE: NORMAL

## 2018-03-15 ENCOUNTER — TELEPHONE (OUTPATIENT)
Dept: ADMINISTRATIVE | Facility: OTHER | Age: 58
End: 2018-03-15

## 2018-05-11 ENCOUNTER — HISTORICAL (OUTPATIENT)
Dept: ADMINISTRATIVE | Facility: HOSPITAL | Age: 58
End: 2018-05-11

## 2018-05-11 LAB
ABS NEUT (OLG): 2.48 X10(3)/MCL (ref 2.1–9.2)
ALBUMIN SERPL-MCNC: 3.5 GM/DL (ref 3.4–5)
ALBUMIN/GLOB SERPL: 1 RATIO (ref 1–2)
ALP SERPL-CCNC: 63 UNIT/L (ref 45–117)
ALT SERPL-CCNC: 92 UNIT/L (ref 12–78)
AMPHET UR QL SCN: NEGATIVE
APPEARANCE, UA: CLEAR
AST SERPL-CCNC: 75 UNIT/L (ref 15–37)
BACTERIA #/AREA URNS AUTO: ABNORMAL /[HPF]
BARBITURATE SCN PRESENT UR: NEGATIVE
BASOPHILS # BLD AUTO: 0.04 X10(3)/MCL
BASOPHILS NFR BLD AUTO: 1 %
BENZODIAZ UR QL SCN: NEGATIVE
BILIRUB SERPL-MCNC: 2.2 MG/DL (ref 0.2–1)
BILIRUB UR QL STRIP: NEGATIVE
BILIRUBIN DIRECT+TOT PNL SERPL-MCNC: 0.6 MG/DL
BILIRUBIN DIRECT+TOT PNL SERPL-MCNC: 1.6 MG/DL
BUN SERPL-MCNC: 22 MG/DL (ref 7–18)
CALCIUM SERPL-MCNC: 8.2 MG/DL (ref 8.5–10.1)
CANNABINOIDS UR QL SCN: POSITIVE
CHLORIDE SERPL-SCNC: 106 MMOL/L (ref 98–107)
CK MB SERPL-MCNC: 2.6 NG/ML (ref 1–3.6)
CK SERPL-CCNC: 189 UNIT/L (ref 39–308)
CO2 SERPL-SCNC: 27 MMOL/L (ref 21–32)
COCAINE UR QL SCN: POSITIVE
COLOR UR: YELLOW
CREAT SERPL-MCNC: 1.6 MG/DL (ref 0.6–1.3)
EOSINOPHIL # BLD AUTO: 0.05 X10(3)/MCL
EOSINOPHIL NFR BLD AUTO: 1 %
ERYTHROCYTE [DISTWIDTH] IN BLOOD BY AUTOMATED COUNT: 22.5 % (ref 11.5–14.5)
GLOBULIN SER-MCNC: 3.8 GM/ML (ref 2.3–3.5)
GLUCOSE (UA): NORMAL
GLUCOSE SERPL-MCNC: 93 MG/DL (ref 74–106)
HCT VFR BLD AUTO: 33.5 % (ref 40–51)
HGB BLD-MCNC: 10.9 GM/DL (ref 13.5–17.5)
HGB UR QL STRIP: NEGATIVE
HYALINE CASTS #/AREA URNS LPF: ABNORMAL /[LPF]
IMM GRANULOCYTES # BLD AUTO: 0.01 10*3/UL
IMM GRANULOCYTES NFR BLD AUTO: 0 %
KETONES UR QL STRIP: NEGATIVE
LEUKOCYTE ESTERASE UR QL STRIP: NEGATIVE
LYMPHOCYTES # BLD AUTO: 2 X10(3)/MCL
LYMPHOCYTES NFR BLD AUTO: 39 % (ref 13–40)
MAGNESIUM SERPL-MCNC: 2.1 MG/DL (ref 1.8–2.4)
MCH RBC QN AUTO: 26.4 PG (ref 26–34)
MCHC RBC AUTO-ENTMCNC: 32.5 GM/DL (ref 31–37)
MCV RBC AUTO: 81.1 FL (ref 80–100)
MONOCYTES # BLD AUTO: 0.6 X10(3)/MCL
MONOCYTES NFR BLD AUTO: 12 % (ref 4–12)
NEUTROPHILS # BLD AUTO: 2.48 X10(3)/MCL
NEUTROPHILS NFR BLD AUTO: 48 X10(3)/MCL
NITRITE UR QL STRIP: NEGATIVE
OPIATES UR QL SCN: POSITIVE
PCP UR QL: NEGATIVE
PH UR STRIP.AUTO: 5.5 [PH] (ref 5–8)
PH UR STRIP: 5.5 [PH] (ref 4.5–8)
PLATELET # BLD AUTO: 267 X10(3)/MCL (ref 130–400)
PMV BLD AUTO: 9.7 FL (ref 7.4–10.4)
POC TROPONIN: 0.02 NG/ML (ref 0–0.08)
POTASSIUM SERPL-SCNC: 3.7 MMOL/L (ref 3.5–5.1)
PROT SERPL-MCNC: 7.3 GM/DL (ref 6.4–8.2)
PROT UR QL STRIP: 50 MG/DL
RBC # BLD AUTO: 4.13 X10(6)/MCL (ref 4.5–5.9)
RBC #/AREA URNS AUTO: ABNORMAL /[HPF]
SODIUM SERPL-SCNC: 139 MMOL/L (ref 136–145)
SP GR UR STRIP: 1.02 (ref 1–1.03)
SQUAMOUS #/AREA URNS LPF: ABNORMAL /[LPF]
TEMPERATURE, URINE (OHS): 23 DEGC (ref 20–25)
UROBILINOGEN UR STRIP-ACNC: 3 MG/DL
WBC # SPEC AUTO: 5.2 X10(3)/MCL (ref 4.5–11)
WBC #/AREA URNS AUTO: ABNORMAL /HPF

## 2018-05-12 ENCOUNTER — HISTORICAL (OUTPATIENT)
Dept: ADMINISTRATIVE | Facility: HOSPITAL | Age: 58
End: 2018-05-12

## 2018-05-12 LAB
ABS NEUT (OLG): 3.03 X10(3)/MCL (ref 2.1–9.2)
ALBUMIN SERPL-MCNC: 3.3 GM/DL (ref 3.4–5)
ALBUMIN/GLOB SERPL: 0.9 RATIO (ref 1.1–2)
ALP SERPL-CCNC: 60 UNIT/L (ref 50–136)
ALT SERPL-CCNC: 90 UNIT/L (ref 12–78)
AMPHET UR QL SCN: ABNORMAL
APPEARANCE, UA: CLEAR
AST SERPL-CCNC: 71 UNIT/L (ref 15–37)
BACTERIA SPEC CULT: NORMAL /HPF
BARBITURATE SCN PRESENT UR: ABNORMAL
BASOPHILS # BLD AUTO: 0 X10(3)/MCL (ref 0–0.2)
BASOPHILS NFR BLD AUTO: 1 %
BENZODIAZ UR QL SCN: ABNORMAL
BILIRUB SERPL-MCNC: 1.5 MG/DL (ref 0.2–1)
BILIRUB UR QL STRIP: NEGATIVE
BILIRUBIN DIRECT+TOT PNL SERPL-MCNC: 0.5 MG/DL (ref 0–0.5)
BILIRUBIN DIRECT+TOT PNL SERPL-MCNC: 1 MG/DL (ref 0–0.8)
BNP BLD-MCNC: 4489 PG/ML (ref 0–125)
BUN SERPL-MCNC: 23 MG/DL (ref 7–18)
CALCIUM SERPL-MCNC: 8.4 MG/DL (ref 8.5–10.1)
CANNABINOIDS UR QL SCN: ABNORMAL
CHLORIDE SERPL-SCNC: 103 MMOL/L (ref 98–107)
CO2 SERPL-SCNC: 26 MMOL/L (ref 21–32)
COCAINE UR QL SCN: POSITIVE
COLOR UR: YELLOW
CREAT SERPL-MCNC: 1.6 MG/DL (ref 0.7–1.3)
EOSINOPHIL # BLD AUTO: 0.1 X10(3)/MCL (ref 0–0.9)
EOSINOPHIL NFR BLD AUTO: 1 %
ERYTHROCYTE [DISTWIDTH] IN BLOOD BY AUTOMATED COUNT: 23.1 % (ref 11.5–17)
GLOBULIN SER-MCNC: 3.7 GM/DL (ref 2.4–3.5)
GLUCOSE (UA): NEGATIVE
GLUCOSE SERPL-MCNC: 94 MG/DL (ref 74–106)
HCT VFR BLD AUTO: 30.9 % (ref 42–52)
HGB BLD-MCNC: 9.8 GM/DL (ref 14–18)
HGB UR QL STRIP: NEGATIVE
KETONES UR QL STRIP: NEGATIVE
LEUKOCYTE ESTERASE UR QL STRIP: NEGATIVE
LYMPHOCYTES # BLD AUTO: 2.2 X10(3)/MCL (ref 0.6–4.6)
LYMPHOCYTES NFR BLD AUTO: 38 %
MCH RBC QN AUTO: 26.7 PG (ref 27–31)
MCHC RBC AUTO-ENTMCNC: 31.7 GM/DL (ref 33–36)
MCV RBC AUTO: 84.2 FL (ref 80–94)
MONOCYTES # BLD AUTO: 0.5 X10(3)/MCL (ref 0.1–1.3)
MONOCYTES NFR BLD AUTO: 8 %
NEUTROPHILS # BLD AUTO: 3.03 X10(3)/MCL (ref 1.4–7.9)
NEUTROPHILS NFR BLD AUTO: 51 %
NITRITE UR QL STRIP: NEGATIVE
OPIATES UR QL SCN: ABNORMAL
PCP UR QL: ABNORMAL
PH UR STRIP.AUTO: 6 [PH] (ref 5–7.5)
PH UR STRIP: 6 [PH] (ref 5–9)
PLATELET # BLD AUTO: 237 X10(3)/MCL (ref 130–400)
PMV BLD AUTO: 9.8 FL (ref 9.4–12.4)
POTASSIUM SERPL-SCNC: 3.5 MMOL/L (ref 3.5–5.1)
PROT SERPL-MCNC: 7 GM/DL (ref 6.4–8.2)
PROT UR QL STRIP: NEGATIVE
RBC # BLD AUTO: 3.67 X10(6)/MCL (ref 4.7–6.1)
RBC #/AREA URNS HPF: NORMAL /[HPF]
SODIUM SERPL-SCNC: 138 MMOL/L (ref 136–145)
SP GR FLD REFRACTOMETRY: 1.01 (ref 1–1.03)
SP GR UR STRIP: 1.01 (ref 1–1.03)
SQUAMOUS EPITHELIAL, UA: NORMAL
TROPONIN I SERPL-MCNC: 0.04 NG/ML (ref 0.02–0.49)
UROBILINOGEN UR STRIP-ACNC: 1
WBC # SPEC AUTO: 5.9 X10(3)/MCL (ref 4.5–11.5)
WBC #/AREA URNS HPF: NORMAL /HPF

## 2018-05-15 LAB
ABS NEUT (OLG): 2.15 X10(3)/MCL (ref 2.1–9.2)
ABS NEUT (OLG): 4.11 X10(3)/MCL (ref 2.1–9.2)
ALBUMIN SERPL-MCNC: 3.4 GM/DL (ref 3.4–5)
ALBUMIN SERPL-MCNC: 3.6 GM/DL (ref 3.4–5)
ALBUMIN/GLOB SERPL: 1 RATIO (ref 1–2)
ALBUMIN/GLOB SERPL: 1 RATIO (ref 1–2)
ALP SERPL-CCNC: 52 UNIT/L (ref 45–117)
ALP SERPL-CCNC: 55 UNIT/L (ref 45–117)
ALT SERPL-CCNC: 62 UNIT/L (ref 12–78)
ALT SERPL-CCNC: 65 UNIT/L (ref 12–78)
AMPHET UR QL SCN: NEGATIVE
AST SERPL-CCNC: 39 UNIT/L (ref 15–37)
AST SERPL-CCNC: 40 UNIT/L (ref 15–37)
BARBITURATE SCN PRESENT UR: NEGATIVE
BASOPHILS # BLD AUTO: 0.01 X10(3)/MCL
BASOPHILS # BLD AUTO: 0.04 X10(3)/MCL
BASOPHILS NFR BLD AUTO: 0 %
BASOPHILS NFR BLD AUTO: 1 %
BENZODIAZ UR QL SCN: NEGATIVE
BILIRUB SERPL-MCNC: 1.5 MG/DL (ref 0.2–1)
BILIRUB SERPL-MCNC: 1.7 MG/DL (ref 0.2–1)
BILIRUBIN DIRECT+TOT PNL SERPL-MCNC: 0.5 MG/DL
BILIRUBIN DIRECT+TOT PNL SERPL-MCNC: 0.6 MG/DL
BILIRUBIN DIRECT+TOT PNL SERPL-MCNC: 1 MG/DL
BILIRUBIN DIRECT+TOT PNL SERPL-MCNC: 1.1 MG/DL
BUN SERPL-MCNC: 19 MG/DL (ref 7–18)
BUN SERPL-MCNC: 20 MG/DL (ref 7–18)
CALCIUM SERPL-MCNC: 8.2 MG/DL (ref 8.5–10.1)
CALCIUM SERPL-MCNC: 8.4 MG/DL (ref 8.5–10.1)
CANNABINOIDS UR QL SCN: NEGATIVE
CHLORIDE SERPL-SCNC: 105 MMOL/L (ref 98–107)
CHLORIDE SERPL-SCNC: 106 MMOL/L (ref 98–107)
CK MB SERPL-MCNC: 3.2 NG/ML (ref 1–3.6)
CK SERPL-CCNC: 252 UNIT/L (ref 39–308)
CO2 SERPL-SCNC: 27 MMOL/L (ref 21–32)
CO2 SERPL-SCNC: 27 MMOL/L (ref 21–32)
COCAINE UR QL SCN: NEGATIVE
CREAT SERPL-MCNC: 1.5 MG/DL (ref 0.6–1.3)
CREAT SERPL-MCNC: 1.5 MG/DL (ref 0.6–1.3)
EOSINOPHIL # BLD AUTO: 0.01 10*3/UL
EOSINOPHIL # BLD AUTO: 0.03 10*3/UL
EOSINOPHIL NFR BLD AUTO: 0 %
EOSINOPHIL NFR BLD AUTO: 1 %
ERYTHROCYTE [DISTWIDTH] IN BLOOD BY AUTOMATED COUNT: 22.7 % (ref 11.5–14.5)
ERYTHROCYTE [DISTWIDTH] IN BLOOD BY AUTOMATED COUNT: 23 % (ref 11.5–14.5)
GLOBULIN SER-MCNC: 3.5 GM/ML (ref 2.3–3.5)
GLOBULIN SER-MCNC: 3.8 GM/ML (ref 2.3–3.5)
GLUCOSE SERPL-MCNC: 103 MG/DL (ref 74–106)
GLUCOSE SERPL-MCNC: 84 MG/DL (ref 74–106)
HCT VFR BLD AUTO: 30.7 % (ref 40–51)
HCT VFR BLD AUTO: 32.2 % (ref 40–51)
HGB BLD-MCNC: 10.2 GM/DL (ref 13.5–17.5)
HGB BLD-MCNC: 9.7 GM/DL (ref 13.5–17.5)
IMM GRANULOCYTES # BLD AUTO: 0.01 10*3/UL
IMM GRANULOCYTES # BLD AUTO: 0.02 10*3/UL
IMM GRANULOCYTES NFR BLD AUTO: 0 %
IMM GRANULOCYTES NFR BLD AUTO: 0 %
LYMPHOCYTES # BLD AUTO: 0.8 X10(3)/MCL
LYMPHOCYTES # BLD AUTO: 2.07 X10(3)/MCL
LYMPHOCYTES NFR BLD AUTO: 16 % (ref 13–40)
LYMPHOCYTES NFR BLD AUTO: 41 % (ref 13–40)
MAGNESIUM SERPL-MCNC: 2 MG/DL (ref 1.8–2.4)
MCH RBC QN AUTO: 25.9 PG (ref 26–34)
MCH RBC QN AUTO: 25.9 PG (ref 26–34)
MCHC RBC AUTO-ENTMCNC: 31.6 GM/DL (ref 31–37)
MCHC RBC AUTO-ENTMCNC: 31.7 GM/DL (ref 31–37)
MCV RBC AUTO: 81.7 FL (ref 80–100)
MCV RBC AUTO: 82.1 FL (ref 80–100)
MONOCYTES # BLD AUTO: 0.16 X10(3)/MCL
MONOCYTES # BLD AUTO: 0.74 X10(3)/MCL
MONOCYTES NFR BLD AUTO: 15 % (ref 4–12)
MONOCYTES NFR BLD AUTO: 3 % (ref 4–12)
NEUTROPHILS # BLD AUTO: 2.15 X10(3)/MCL
NEUTROPHILS # BLD AUTO: 4.11 X10(3)/MCL
NEUTROPHILS NFR BLD AUTO: 43 X10(3)/MCL
NEUTROPHILS NFR BLD AUTO: 80 X10(3)/MCL
OPIATES UR QL SCN: NEGATIVE
PCP UR QL: NEGATIVE
PH UR STRIP.AUTO: 7 [PH] (ref 5–8)
PLATELET # BLD AUTO: 245 X10(3)/MCL (ref 130–400)
PLATELET # BLD AUTO: 268 X10(3)/MCL (ref 130–400)
PMV BLD AUTO: 10.1 FL (ref 7.4–10.4)
PMV BLD AUTO: 9.9 FL (ref 7.4–10.4)
POC TROPONIN: 0.03 NG/ML (ref 0–0.08)
POTASSIUM SERPL-SCNC: 3.2 MMOL/L (ref 3.5–5.1)
POTASSIUM SERPL-SCNC: 3.8 MMOL/L (ref 3.5–5.1)
PROT SERPL-MCNC: 6.9 GM/DL (ref 6.4–8.2)
PROT SERPL-MCNC: 7.4 GM/DL (ref 6.4–8.2)
RBC # BLD AUTO: 3.74 X10(6)/MCL (ref 4.5–5.9)
RBC # BLD AUTO: 3.94 X10(6)/MCL (ref 4.5–5.9)
SODIUM SERPL-SCNC: 140 MMOL/L (ref 136–145)
SODIUM SERPL-SCNC: 141 MMOL/L (ref 136–145)
TEMPERATURE, URINE (OHS): 25 DEGC (ref 20–25)
TROPONIN I SERPL-MCNC: 0.02 NG/ML (ref 0–0.05)
TROPONIN I SERPL-MCNC: 0.03 NG/ML (ref 0–0.05)
WBC # SPEC AUTO: 5 X10(3)/MCL (ref 4.5–11)
WBC # SPEC AUTO: 5.1 X10(3)/MCL (ref 4.5–11)

## 2018-05-16 LAB
ABS NEUT (OLG): 4.62 X10(3)/MCL (ref 2.1–9.2)
ALBUMIN SERPL-MCNC: 3.3 GM/DL (ref 3.4–5)
ALBUMIN/GLOB SERPL: 1 RATIO (ref 1–2)
ALP SERPL-CCNC: 52 UNIT/L (ref 45–117)
ALT SERPL-CCNC: 62 UNIT/L (ref 12–78)
AST SERPL-CCNC: 35 UNIT/L (ref 15–37)
BASOPHILS # BLD AUTO: 0.01 X10(3)/MCL
BASOPHILS NFR BLD AUTO: 0 %
BILIRUB SERPL-MCNC: 1.4 MG/DL (ref 0.2–1)
BILIRUBIN DIRECT+TOT PNL SERPL-MCNC: 0.5 MG/DL
BILIRUBIN DIRECT+TOT PNL SERPL-MCNC: 0.9 MG/DL
BUN SERPL-MCNC: 22 MG/DL (ref 7–18)
CALCIUM SERPL-MCNC: 8.5 MG/DL (ref 8.5–10.1)
CHLORIDE SERPL-SCNC: 107 MMOL/L (ref 98–107)
CO2 SERPL-SCNC: 21 MMOL/L (ref 21–32)
CREAT SERPL-MCNC: 1.3 MG/DL (ref 0.6–1.3)
ERYTHROCYTE [DISTWIDTH] IN BLOOD BY AUTOMATED COUNT: 23.3 % (ref 11.5–14.5)
FERRITIN SERPL-MCNC: 35.4 NG/ML (ref 26–388)
GLOBULIN SER-MCNC: 3.8 GM/ML (ref 2.3–3.5)
GLUCOSE SERPL-MCNC: 127 MG/DL (ref 74–106)
HCT VFR BLD AUTO: 34.6 % (ref 40–51)
HGB BLD-MCNC: 10.9 GM/DL (ref 13.5–17.5)
IMM GRANULOCYTES # BLD AUTO: 0.03 10*3/UL
IMM GRANULOCYTES NFR BLD AUTO: 0 %
IRON SATN MFR SERPL: 6.4 % (ref 15–50)
IRON SERPL-MCNC: 33 MCG/DL (ref 65–175)
LYMPHOCYTES # BLD AUTO: 0.82 X10(3)/MCL
LYMPHOCYTES NFR BLD AUTO: 15 % (ref 13–40)
MCH RBC QN AUTO: 25.9 PG (ref 26–34)
MCHC RBC AUTO-ENTMCNC: 31.5 GM/DL (ref 31–37)
MCV RBC AUTO: 82.2 FL (ref 80–100)
MONOCYTES # BLD AUTO: 0.15 X10(3)/MCL
MONOCYTES NFR BLD AUTO: 3 % (ref 4–12)
NEUTROPHILS # BLD AUTO: 4.62 X10(3)/MCL
NEUTROPHILS NFR BLD AUTO: 82 X10(3)/MCL
PLATELET # BLD AUTO: 266 X10(3)/MCL (ref 130–400)
PMV BLD AUTO: 10.2 FL (ref 7.4–10.4)
POTASSIUM SERPL-SCNC: 3.7 MMOL/L (ref 3.5–5.1)
PROT SERPL-MCNC: 7.1 GM/DL (ref 6.4–8.2)
RBC # BLD AUTO: 4.21 X10(6)/MCL (ref 4.5–5.9)
SODIUM SERPL-SCNC: 138 MMOL/L (ref 136–145)
TIBC SERPL-MCNC: 513 MCG/DL (ref 250–450)
TRANSFERRIN SERPL-MCNC: 310 MG/DL (ref 200–360)
TROPONIN I SERPL-MCNC: <0.015 NG/ML (ref 0–0.05)
WBC # SPEC AUTO: 5.6 X10(3)/MCL (ref 4.5–11)

## 2018-05-17 ENCOUNTER — HISTORICAL (OUTPATIENT)
Dept: ADMINISTRATIVE | Facility: HOSPITAL | Age: 58
End: 2018-05-17

## 2018-05-17 LAB
CREAT UR-MCNC: 107 MG/DL
CRP SERPL HS-MCNC: 1.53 MG/L (ref 0–3)
CRP SERPL-MCNC: <0.3 MG/DL
ERYTHROCYTE [SEDIMENTATION RATE] IN BLOOD: 11 MM/HR (ref 0–15)
FESODIUM % (OHS): <1 %
HAV IGM SERPL QL IA: NONREACTIVE
HBV CORE IGM SERPL QL IA: NONREACTIVE
HBV SURFACE AG SERPL QL IA: NEGATIVE
HCV AB SERPL QL IA: NONREACTIVE
HIV 1+2 AB+HIV1 P24 AG SERPL QL IA: NONREACTIVE
SODIUM UR-SCNC: 44 MMOL/L
SODIUM UR-SCNC: 44 MMOL/L
TSH SERPL-ACNC: 2.78 MIU/L (ref 0.36–3.74)

## 2018-05-18 LAB
% SATURATION: 71.3 %
ABS NEUT (OLG): 3.57 X10(3)/MCL (ref 2.1–9.2)
ABS NEUT (OLG): 4.24 X10(3)/MCL (ref 2.1–9.2)
ABS NEUT (OLG): 5.83 X10(3)/MCL (ref 2.1–9.2)
ALBUMIN SERPL-MCNC: 3.1 GM/DL (ref 3.4–5)
ALBUMIN SERPL-MCNC: 3.1 GM/DL (ref 3.4–5)
ALBUMIN/GLOB SERPL: 1 RATIO (ref 1–2)
ALBUMIN/GLOB SERPL: 1 RATIO (ref 1–2)
ALP SERPL-CCNC: 49 UNIT/L (ref 45–117)
ALP SERPL-CCNC: 52 UNIT/L (ref 45–117)
ALT SERPL-CCNC: 43 UNIT/L (ref 12–78)
ALT SERPL-CCNC: 50 UNIT/L (ref 12–78)
AST SERPL-CCNC: 25 UNIT/L (ref 15–37)
AST SERPL-CCNC: 33 UNIT/L (ref 15–37)
BASOPHILS # BLD AUTO: 0.02 X10(3)/MCL
BASOPHILS # BLD AUTO: 0.02 X10(3)/MCL
BASOPHILS # BLD AUTO: 0.04 X10(3)/MCL
BASOPHILS NFR BLD AUTO: 0 %
BASOPHILS NFR BLD AUTO: 0 %
BASOPHILS NFR BLD AUTO: 1 %
BILIRUB SERPL-MCNC: 1.1 MG/DL (ref 0.2–1)
BILIRUB SERPL-MCNC: 1.1 MG/DL (ref 0.2–1)
BILIRUBIN DIRECT+TOT PNL SERPL-MCNC: 0.4 MG/DL
BILIRUBIN DIRECT+TOT PNL SERPL-MCNC: 0.5 MG/DL
BILIRUBIN DIRECT+TOT PNL SERPL-MCNC: 0.6 MG/DL
BILIRUBIN DIRECT+TOT PNL SERPL-MCNC: 0.7 MG/DL
BUN SERPL-MCNC: 20 MG/DL (ref 7–18)
BUN SERPL-MCNC: 20 MG/DL (ref 7–18)
BUN SERPL-MCNC: 21 MG/DL (ref 7–18)
BUN SERPL-MCNC: 21 MG/DL (ref 7–18)
CALCIUM SERPL-MCNC: 7.5 MG/DL (ref 8.5–10.1)
CALCIUM SERPL-MCNC: 8.1 MG/DL (ref 8.5–10.1)
CALCIUM SERPL-MCNC: 8.2 MG/DL (ref 8.5–10.1)
CALCIUM SERPL-MCNC: 8.5 MG/DL (ref 8.5–10.1)
CHLORIDE SERPL-SCNC: 104 MMOL/L (ref 98–107)
CHLORIDE SERPL-SCNC: 106 MMOL/L (ref 98–107)
CHLORIDE SERPL-SCNC: 106 MMOL/L (ref 98–107)
CHLORIDE SERPL-SCNC: 107 MMOL/L (ref 98–107)
CO HGB VEN: 2.8 % (ref 0.5–1.5)
CO2 SERPL-SCNC: 25 MMOL/L (ref 21–32)
CO2 SERPL-SCNC: 26 MMOL/L (ref 21–32)
CO2 SERPL-SCNC: 27 MMOL/L (ref 21–32)
CO2 SERPL-SCNC: 28 MMOL/L (ref 21–32)
CORTIS SERPL-SCNC: 9 MCG/DL
CREAT SERPL-MCNC: 1.2 MG/DL (ref 0.6–1.3)
CREAT SERPL-MCNC: 1.3 MG/DL (ref 0.6–1.3)
CREAT/UREA NIT SERPL: 16
CREAT/UREA NIT SERPL: 18
D BASE VENOUS: 1.4 (ref -2–2)
EOSINOPHIL # BLD AUTO: 0.01 X10(3)/MCL
EOSINOPHIL # BLD AUTO: 0.05 X10(3)/MCL
EOSINOPHIL # BLD AUTO: 0.08 X10(3)/MCL
EOSINOPHIL NFR BLD AUTO: 0 %
EOSINOPHIL NFR BLD AUTO: 1 %
EOSINOPHIL NFR BLD AUTO: 1 %
ERYTHROCYTE [DISTWIDTH] IN BLOOD BY AUTOMATED COUNT: 23 % (ref 11.5–14.5)
ERYTHROCYTE [DISTWIDTH] IN BLOOD BY AUTOMATED COUNT: 23.6 % (ref 11.5–14.5)
ERYTHROCYTE [DISTWIDTH] IN BLOOD BY AUTOMATED COUNT: 23.7 % (ref 11.5–14.5)
GLOBULIN SER-MCNC: 3.3 GM/ML (ref 2.3–3.5)
GLOBULIN SER-MCNC: 3.5 GM/ML (ref 2.3–3.5)
GLUCOSE SERPL-MCNC: 108 MG/DL (ref 74–106)
GLUCOSE SERPL-MCNC: 160 MG/DL (ref 74–106)
GLUCOSE SERPL-MCNC: 320 MG/DL (ref 74–106)
GLUCOSE SERPL-MCNC: 82 MG/DL (ref 74–106)
HCO3 UR-SCNC: 26 MMOL/L (ref 22–26)
HCO3 UR-SCNC: 27.1 MMOL/L (ref 22–26)
HCO3 VENOUS: 28 MMOL/L (ref 25–40)
HCT VFR BLD AUTO: 30.2 % (ref 40–51)
HCT VFR BLD AUTO: 30.5 % (ref 40–51)
HCT VFR BLD AUTO: 32.4 % (ref 40–51)
HGB BLD-MCNC: 10.2 GM/DL (ref 13.5–17.5)
HGB BLD-MCNC: 9.5 GM/DL (ref 13.5–17.5)
HGB BLD-MCNC: 9.5 GM/DL (ref 13.5–17.5)
IMM GRANULOCYTES # BLD AUTO: 0.01 10*3/UL
IMM GRANULOCYTES # BLD AUTO: 0.01 10*3/UL
IMM GRANULOCYTES # BLD AUTO: 0.02 10*3/UL
IMM GRANULOCYTES NFR BLD AUTO: 0 %
LACTATE SERPL-SCNC: 1.7 MMOL/L (ref 0.4–2)
LYMPHOCYTES # BLD AUTO: 0.49 X10(3)/MCL
LYMPHOCYTES # BLD AUTO: 1.28 X10(3)/MCL
LYMPHOCYTES # BLD AUTO: 1.64 X10(3)/MCL
LYMPHOCYTES NFR BLD AUTO: 23 % (ref 13–40)
LYMPHOCYTES NFR BLD AUTO: 25 % (ref 13–40)
LYMPHOCYTES NFR BLD AUTO: 7 % (ref 13–40)
MAGNESIUM SERPL-MCNC: 2.1 MG/DL (ref 1.8–2.4)
MCH RBC QN AUTO: 26 PG (ref 26–34)
MCH RBC QN AUTO: 26.2 PG (ref 26–34)
MCH RBC QN AUTO: 26.4 PG (ref 26–34)
MCHC RBC AUTO-ENTMCNC: 31.1 GM/DL (ref 31–37)
MCHC RBC AUTO-ENTMCNC: 31.5 GM/DL (ref 31–37)
MCHC RBC AUTO-ENTMCNC: 31.5 GM/DL (ref 31–37)
MCV RBC AUTO: 82.5 FL (ref 80–100)
MCV RBC AUTO: 83.7 FL (ref 80–100)
MCV RBC AUTO: 84 FL (ref 80–100)
MET HGB VEN: 1.1 % (ref 0–1.5)
MONOCYTES # BLD AUTO: 0.34 X10(3)/MCL
MONOCYTES # BLD AUTO: 0.55 X10(3)/MCL
MONOCYTES # BLD AUTO: 0.56 X10(3)/MCL
MONOCYTES NFR BLD AUTO: 10 % (ref 4–12)
MONOCYTES NFR BLD AUTO: 5 % (ref 4–12)
MONOCYTES NFR BLD AUTO: 8 % (ref 4–12)
NEUTROPHILS # BLD AUTO: 3.57 X10(3)/MCL
NEUTROPHILS # BLD AUTO: 4.24 X10(3)/MCL
NEUTROPHILS # BLD AUTO: 5.83 X10(3)/MCL
NEUTROPHILS NFR BLD AUTO: 65 X10(3)/MCL
NEUTROPHILS NFR BLD AUTO: 65 X10(3)/MCL
NEUTROPHILS NFR BLD AUTO: 87 X10(3)/MCL
O2 HGB ARTERIAL: 97.2 % (ref 94–100)
O2 HGB ARTERIAL: 97.2 % (ref 94–100)
O2 HGB VENOUS: 68.5 % (ref 40–85)
PCO2 BLDA: 49 MMHG (ref 35–45)
PCO2 BLDA: 54 MMHG (ref 35–45)
PCO2 VENOUS: 58 MMHG (ref 41–51)
PH SMN: 7.29 [PH] (ref 7.35–7.45)
PH SMN: 7.35 [PH] (ref 7.35–7.45)
PH VENOUS: 7.3 (ref 7.32–7.42)
PHOSPHATE SERPL-MCNC: 2.9 MG/DL (ref 2.5–4.9)
PLATELET # BLD AUTO: 216 X10(3)/MCL (ref 130–400)
PLATELET # BLD AUTO: 227 X10(3)/MCL (ref 130–400)
PLATELET # BLD AUTO: 258 X10(3)/MCL (ref 130–400)
PMV BLD AUTO: 10.1 FL (ref 7.4–10.4)
PMV BLD AUTO: 10.2 FL (ref 7.4–10.4)
PMV BLD AUTO: 10.5 FL (ref 7.4–10.4)
PO2 BLDA: 140 MMHG (ref 75–100)
PO2 BLDA: 557 MMHG (ref 75–100)
PO2 VENOUS: 41 MMHG (ref 30–100)
POC ALLENS TEST: ABNORMAL
POC ALLENS TEST: ABNORMAL
POC BE: -1 (ref -2–2)
POC BE: 1.1 (ref -2–2)
POC CO HGB: 1.6 % (ref 0.5–1.5)
POC CO HGB: 2.3 % (ref 0.5–1.5)
POC CO2: 27.7 MMOL/L (ref 22–27)
POC CO2: 28.6 MMOL/L (ref 22–27)
POC MET HGB: 1 % (ref 0–1.5)
POC MET HGB: 1.4 % (ref 0–1.5)
POC SAMPLESOURCE: ABNORMAL
POC SAMPLESOURCE: ABNORMAL
POC SATURATED O2: 100.2 % (ref 95–98)
POC SATURATED O2: 100.5 % (ref 95–98)
POC SITE: ABNORMAL
POC SITE: ABNORMAL
POC THB: 9.3 GM/DL (ref 12–18)
POC THB: 9.6 GM/DL (ref 12–18)
POC TREATMENT: ABNORMAL
POC TREATMENT: ABNORMAL
POTASSIUM SERPL-SCNC: 4 MMOL/L (ref 3.5–5.1)
POTASSIUM SERPL-SCNC: 4.5 MMOL/L (ref 3.5–5.1)
PROT SERPL-MCNC: 6.4 GM/DL (ref 6.4–8.2)
PROT SERPL-MCNC: 6.6 GM/DL (ref 6.4–8.2)
RBC # BLD AUTO: 3.63 X10(6)/MCL (ref 4.5–5.9)
RBC # BLD AUTO: 3.66 X10(6)/MCL (ref 4.5–5.9)
RBC # BLD AUTO: 3.87 X10(6)/MCL (ref 4.5–5.9)
SAMPLE VEN: ABNORMAL
SETTING 1 ABG: ABNORMAL
SETTING 1 ABG: ABNORMAL
SETTING 2 ABG: ABNORMAL
SETTING 2 ABG: ABNORMAL
SETTING 3 ABG: ABNORMAL
SETTING 4 ABG: ABNORMAL
SITE VEN: ABNORMAL
SODIUM SERPL-SCNC: 135 MMOL/L (ref 136–145)
SODIUM SERPL-SCNC: 136 MMOL/L (ref 136–145)
SODIUM SERPL-SCNC: 138 MMOL/L (ref 136–145)
SODIUM SERPL-SCNC: 139 MMOL/L (ref 136–145)
THB VEN: 9.1 GM/DL (ref 12–18)
TREATMENT VEN: ABNORMAL
TROPONIN I SERPL-MCNC: 0.02 NG/ML (ref 0–0.05)
TROPONIN I SERPL-MCNC: 0.03 NG/ML (ref 0–0.05)
TROPONIN I SERPL-MCNC: 0.03 NG/ML (ref 0–0.05)
WBC # SPEC AUTO: 5.5 X10(3)/MCL (ref 4.5–11)
WBC # SPEC AUTO: 6.5 X10(3)/MCL (ref 4.5–11)
WBC # SPEC AUTO: 6.7 X10(3)/MCL (ref 4.5–11)

## 2018-05-19 LAB
ABS NEUT (OLG): 6.24 X10(3)/MCL (ref 2.1–9.2)
ALBUMIN SERPL-MCNC: 2.7 GM/DL (ref 3.4–5)
ALBUMIN/GLOB SERPL: 1 RATIO (ref 1–2)
ALP SERPL-CCNC: 46 UNIT/L (ref 45–117)
ALT SERPL-CCNC: 43 UNIT/L (ref 12–78)
AST SERPL-CCNC: 30 UNIT/L (ref 15–37)
BASOPHILS # BLD AUTO: 0.01 X10(3)/MCL
BASOPHILS NFR BLD AUTO: 0 %
BILIRUB SERPL-MCNC: 0.9 MG/DL (ref 0.2–1)
BILIRUBIN DIRECT+TOT PNL SERPL-MCNC: 0.4 MG/DL
BILIRUBIN DIRECT+TOT PNL SERPL-MCNC: 0.5 MG/DL
BUN SERPL-MCNC: 18 MG/DL (ref 7–18)
CALCIUM SERPL-MCNC: 8.2 MG/DL (ref 8.5–10.1)
CHLORIDE SERPL-SCNC: 111 MMOL/L (ref 98–107)
CO2 SERPL-SCNC: 25 MMOL/L (ref 21–32)
CREAT SERPL-MCNC: 1.2 MG/DL (ref 0.6–1.3)
ERYTHROCYTE [DISTWIDTH] IN BLOOD BY AUTOMATED COUNT: 23.2 % (ref 11.5–14.5)
GLOBULIN SER-MCNC: 3.5 GM/ML (ref 2.3–3.5)
GLUCOSE SERPL-MCNC: 114 MG/DL (ref 74–106)
HCO3 UR-SCNC: 24.9 MMOL/L (ref 22–26)
HCT VFR BLD AUTO: 29.7 % (ref 40–51)
HGB BLD-MCNC: 9.4 GM/DL (ref 13.5–17.5)
IMM GRANULOCYTES # BLD AUTO: 0.02 10*3/UL
IMM GRANULOCYTES NFR BLD AUTO: 0 %
LYMPHOCYTES # BLD AUTO: 0.55 X10(3)/MCL
LYMPHOCYTES NFR BLD AUTO: 8 % (ref 13–40)
MCH RBC QN AUTO: 25.8 PG (ref 26–34)
MCHC RBC AUTO-ENTMCNC: 31.6 GM/DL (ref 31–37)
MCV RBC AUTO: 81.4 FL (ref 80–100)
MONOCYTES # BLD AUTO: 0.2 X10(3)/MCL
MONOCYTES NFR BLD AUTO: 3 % (ref 4–12)
NEUTROPHILS # BLD AUTO: 6.24 X10(3)/MCL
NEUTROPHILS NFR BLD AUTO: 89 X10(3)/MCL
O2 HGB ARTERIAL: 96.5 % (ref 94–100)
PCO2 BLDA: 35 MMHG (ref 35–45)
PH SMN: 7.46 [PH] (ref 7.35–7.45)
PLATELET # BLD AUTO: 220 X10(3)/MCL (ref 130–400)
PMV BLD AUTO: 10 FL (ref 7.4–10.4)
PO2 BLDA: 138 MMHG (ref 75–100)
POC ALLENS TEST: ABNORMAL
POC BE: 1.2 (ref -2–2)
POC CO HGB: 1.9 % (ref 0.5–1.5)
POC CO2: 26 MMOL/L (ref 22–27)
POC MET HGB: 1.4 % (ref 0–1.5)
POC SAMPLESOURCE: ABNORMAL
POC SATURATED O2: 99.7 % (ref 95–98)
POC SITE: ABNORMAL
POC THB: 10.1 GM/DL (ref 12–18)
POC TREATMENT: ABNORMAL
POTASSIUM SERPL-SCNC: 4.2 MMOL/L (ref 3.5–5.1)
PROT SERPL-MCNC: 6.2 GM/DL (ref 6.4–8.2)
RBC # BLD AUTO: 3.65 X10(6)/MCL (ref 4.5–5.9)
SETTING 1 ABG: ABNORMAL
SETTING 2 ABG: ABNORMAL
SETTING 3 ABG: ABNORMAL
SETTING 4 ABG: ABNORMAL
SODIUM SERPL-SCNC: 141 MMOL/L (ref 136–145)
TROPONIN I SERPL-MCNC: 0.02 NG/ML (ref 0–0.05)
WBC # SPEC AUTO: 7 X10(3)/MCL (ref 4.5–11)

## 2018-05-20 LAB
ABS NEUT (OLG): 11.93 X10(3)/MCL (ref 2.1–9.2)
ALBUMIN SERPL-MCNC: 2.6 GM/DL (ref 3.4–5)
ALBUMIN/GLOB SERPL: 1 RATIO (ref 1–2)
ALP SERPL-CCNC: 45 UNIT/L (ref 45–117)
ALT SERPL-CCNC: 37 UNIT/L (ref 12–78)
AST SERPL-CCNC: 21 UNIT/L (ref 15–37)
BASOPHILS # BLD AUTO: 0.01 X10(3)/MCL
BASOPHILS NFR BLD AUTO: 0 %
BILIRUB SERPL-MCNC: 0.7 MG/DL (ref 0.2–1)
BILIRUBIN DIRECT+TOT PNL SERPL-MCNC: 0.3 MG/DL
BILIRUBIN DIRECT+TOT PNL SERPL-MCNC: 0.4 MG/DL
BUN SERPL-MCNC: 18 MG/DL (ref 7–18)
CALCIUM SERPL-MCNC: 8.5 MG/DL (ref 8.5–10.1)
CHLORIDE SERPL-SCNC: 109 MMOL/L (ref 98–107)
CO2 SERPL-SCNC: 26 MMOL/L (ref 21–32)
CREAT SERPL-MCNC: 1.3 MG/DL (ref 0.6–1.3)
ERYTHROCYTE [DISTWIDTH] IN BLOOD BY AUTOMATED COUNT: 23.9 % (ref 11.5–14.5)
GLOBULIN SER-MCNC: 3.5 GM/ML (ref 2.3–3.5)
GLUCOSE SERPL-MCNC: 106 MG/DL (ref 74–106)
HCO3 UR-SCNC: 27.8 MMOL/L (ref 22–26)
HCT VFR BLD AUTO: 31 % (ref 40–51)
HGB BLD-MCNC: 9.8 GM/DL (ref 13.5–17.5)
IMM GRANULOCYTES # BLD AUTO: 0.05 10*3/UL
IMM GRANULOCYTES NFR BLD AUTO: 0 %
LYMPHOCYTES # BLD AUTO: 0.68 X10(3)/MCL
LYMPHOCYTES NFR BLD AUTO: 5 % (ref 13–40)
MCH RBC QN AUTO: 26.2 PG (ref 26–34)
MCHC RBC AUTO-ENTMCNC: 31.6 GM/DL (ref 31–37)
MCV RBC AUTO: 82.9 FL (ref 80–100)
MONOCYTES # BLD AUTO: 0.99 X10(3)/MCL
MONOCYTES NFR BLD AUTO: 7 % (ref 4–12)
NEUTROPHILS # BLD AUTO: 11.93 X10(3)/MCL
NEUTROPHILS NFR BLD AUTO: 87 X10(3)/MCL
O2 HGB ARTERIAL: 96.9 % (ref 94–100)
PCO2 BLDA: 40 MMHG (ref 35–45)
PH SMN: 7.45 [PH] (ref 7.35–7.45)
PLATELET # BLD AUTO: 253 X10(3)/MCL (ref 130–400)
PMV BLD AUTO: 10.4 FL (ref 7.4–10.4)
PO2 BLDA: 118 MMHG (ref 75–100)
POC ALLENS TEST: ABNORMAL
POC BE: 3.5 (ref -2–2)
POC CO HGB: 2.2 % (ref 0.5–1.5)
POC CO2: 29 MMOL/L (ref 22–27)
POC MET HGB: 1 % (ref 0–1.5)
POC SAMPLESOURCE: ABNORMAL
POC SATURATED O2: 100.2 % (ref 95–98)
POC SITE: ABNORMAL
POC THB: 9.8 GM/DL (ref 12–18)
POC TREATMENT: ABNORMAL
POTASSIUM SERPL-SCNC: 4.5 MMOL/L (ref 3.5–5.1)
PROT SERPL-MCNC: 6.1 GM/DL (ref 6.4–8.2)
RBC # BLD AUTO: 3.74 X10(6)/MCL (ref 4.5–5.9)
SETTING 1 ABG: ABNORMAL
SETTING 2 ABG: ABNORMAL
SETTING 3 ABG: ABNORMAL
SETTING 4 ABG: ABNORMAL
SODIUM SERPL-SCNC: 142 MMOL/L (ref 136–145)
VANCOMYCIN TROUGH SERPL-MCNC: 10.2 MCG/ML (ref 10–20)
WBC # SPEC AUTO: 13.7 X10(3)/MCL (ref 4.5–11)

## 2018-05-21 LAB
ABS NEUT (OLG): 8.62 X10(3)/MCL (ref 2.1–9.2)
ALBUMIN SERPL-MCNC: 2.5 GM/DL (ref 3.4–5)
ALBUMIN/GLOB SERPL: 1 RATIO (ref 1–2)
ALP SERPL-CCNC: 44 UNIT/L (ref 45–117)
ALT SERPL-CCNC: 27 UNIT/L (ref 12–78)
AST SERPL-CCNC: 14 UNIT/L (ref 15–37)
BASOPHILS # BLD AUTO: 0.01 X10(3)/MCL
BASOPHILS NFR BLD AUTO: 0 %
BILIRUB SERPL-MCNC: 0.6 MG/DL (ref 0.2–1)
BILIRUBIN DIRECT+TOT PNL SERPL-MCNC: 0.2 MG/DL
BILIRUBIN DIRECT+TOT PNL SERPL-MCNC: 0.4 MG/DL
BUN SERPL-MCNC: 21 MG/DL (ref 7–18)
CALCIUM SERPL-MCNC: 8.5 MG/DL (ref 8.5–10.1)
CHLORIDE SERPL-SCNC: 111 MMOL/L (ref 98–107)
CO2 SERPL-SCNC: 27 MMOL/L (ref 21–32)
CREAT SERPL-MCNC: 1.2 MG/DL (ref 0.6–1.3)
ERYTHROCYTE [DISTWIDTH] IN BLOOD BY AUTOMATED COUNT: 24.9 % (ref 11.5–14.5)
GLOBULIN SER-MCNC: 3.7 GM/ML (ref 2.3–3.5)
GLUCOSE SERPL-MCNC: 87 MG/DL (ref 74–106)
HCO3 UR-SCNC: 27.2 MMOL/L (ref 22–26)
HCO3 UR-SCNC: 29.2 MMOL/L (ref 22–26)
HCT VFR BLD AUTO: 33.1 % (ref 40–51)
HGB BLD-MCNC: 10.3 GM/DL (ref 13.5–17.5)
IMM GRANULOCYTES # BLD AUTO: 0.05 10*3/UL
IMM GRANULOCYTES NFR BLD AUTO: 0 %
LYMPHOCYTES # BLD AUTO: 1.06 X10(3)/MCL
LYMPHOCYTES NFR BLD AUTO: 10 % (ref 13–40)
MCH RBC QN AUTO: 26.3 PG (ref 26–34)
MCHC RBC AUTO-ENTMCNC: 31.1 GM/DL (ref 31–37)
MCV RBC AUTO: 84.4 FL (ref 80–100)
MONOCYTES # BLD AUTO: 0.73 X10(3)/MCL
MONOCYTES NFR BLD AUTO: 7 % (ref 4–12)
NEUTROPHILS # BLD AUTO: 8.62 X10(3)/MCL
NEUTROPHILS NFR BLD AUTO: 82 X10(3)/MCL
O2 HGB ARTERIAL: 96 % (ref 94–100)
O2 HGB ARTERIAL: 97.6 % (ref 94–100)
PCO2 BLDA: 40 MMHG (ref 35–45)
PCO2 BLDA: 41 MMHG (ref 35–45)
PH SMN: 7.44 [PH] (ref 7.35–7.45)
PH SMN: 7.46 [PH] (ref 7.35–7.45)
PLATELET # BLD AUTO: 256 X10(3)/MCL (ref 130–400)
PMV BLD AUTO: 10.6 FL (ref 7.4–10.4)
PO2 BLDA: 101 MMHG (ref 75–100)
PO2 BLDA: 136 MMHG (ref 75–100)
POC ALLENS TEST: ABNORMAL
POC ALLENS TEST: ABNORMAL
POC BE: 2.8 (ref -2–2)
POC BE: 4.9 (ref -2–2)
POC CO HGB: 1.8 % (ref 0.5–1.5)
POC CO HGB: 2.1 % (ref 0.5–1.5)
POC CO2: 28.4 MMOL/L (ref 22–27)
POC CO2: 30.5 MMOL/L (ref 22–27)
POC MET HGB: 0.6 % (ref 0–1.5)
POC MET HGB: 1.1 % (ref 0–1.5)
POC SAMPLESOURCE: ABNORMAL
POC SAMPLESOURCE: ABNORMAL
POC SATURATED O2: 100.3 % (ref 95–98)
POC SATURATED O2: 98.9 % (ref 95–98)
POC SITE: ABNORMAL
POC SITE: ABNORMAL
POC THB: 10.5 GM/DL (ref 12–18)
POC THB: 11.4 GM/DL (ref 12–18)
POC TREATMENT: ABNORMAL
POC TREATMENT: ABNORMAL
POTASSIUM SERPL-SCNC: 4.5 MMOL/L (ref 3.5–5.1)
PROT SERPL-MCNC: 6.2 GM/DL (ref 6.4–8.2)
RBC # BLD AUTO: 3.92 X10(6)/MCL (ref 4.5–5.9)
SETTING 1 ABG: ABNORMAL
SETTING 2 ABG: ABNORMAL
SETTING 3 ABG: ABNORMAL
SETTING 4 ABG: ABNORMAL
SODIUM SERPL-SCNC: 142 MMOL/L (ref 136–145)
WBC # SPEC AUTO: 10.5 X10(3)/MCL (ref 4.5–11)

## 2018-05-22 LAB
ABS NEUT (OLG): 10.76 X10(3)/MCL (ref 2.1–9.2)
ABS NEUT (OLG): 7.35 X10(3)/MCL (ref 2.1–9.2)
ALBUMIN SERPL-MCNC: 2.6 GM/DL (ref 3.4–5)
ALBUMIN/GLOB SERPL: 1 RATIO (ref 1–2)
ALP SERPL-CCNC: 50 UNIT/L (ref 45–117)
ALT SERPL-CCNC: 29 UNIT/L (ref 12–78)
AMMONIA PLAS-MSCNC: 30 MMOL/L (ref 11–32)
AST SERPL-CCNC: 24 UNIT/L (ref 15–37)
BASOPHILS # BLD AUTO: 0.01 X10(3)/MCL
BASOPHILS # BLD AUTO: 0.01 X10(3)/MCL
BASOPHILS NFR BLD AUTO: 0 %
BASOPHILS NFR BLD AUTO: 0 %
BILIRUB SERPL-MCNC: 0.8 MG/DL (ref 0.2–1)
BILIRUBIN DIRECT+TOT PNL SERPL-MCNC: 0.4 MG/DL
BILIRUBIN DIRECT+TOT PNL SERPL-MCNC: 0.4 MG/DL
BUN SERPL-MCNC: 16 MG/DL (ref 7–18)
BUN SERPL-MCNC: 19 MG/DL (ref 7–18)
CALCIUM SERPL-MCNC: 8.3 MG/DL (ref 8.5–10.1)
CALCIUM SERPL-MCNC: 8.5 MG/DL (ref 8.5–10.1)
CHLORIDE SERPL-SCNC: 106 MMOL/L (ref 98–107)
CHLORIDE SERPL-SCNC: 108 MMOL/L (ref 98–107)
CO2 SERPL-SCNC: 27 MMOL/L (ref 21–32)
CO2 SERPL-SCNC: 28 MMOL/L (ref 21–32)
CREAT SERPL-MCNC: 1 MG/DL (ref 0.6–1.3)
CREAT SERPL-MCNC: 1 MG/DL (ref 0.6–1.3)
CREAT/UREA NIT SERPL: 16
EOSINOPHIL # BLD AUTO: 0.01 X10(3)/MCL
EOSINOPHIL NFR BLD AUTO: 0 %
ERYTHROCYTE [DISTWIDTH] IN BLOOD BY AUTOMATED COUNT: 24 % (ref 11.5–14.5)
ERYTHROCYTE [DISTWIDTH] IN BLOOD BY AUTOMATED COUNT: 24.2 % (ref 11.5–14.5)
FINAL CULTURE: NORMAL
FINAL CULTURE: NORMAL
GLOBULIN SER-MCNC: 3.8 GM/ML (ref 2.3–3.5)
GLUCOSE SERPL-MCNC: 109 MG/DL (ref 74–106)
GLUCOSE SERPL-MCNC: 112 MG/DL (ref 74–106)
HCO3 UR-SCNC: 24.7 MMOL/L (ref 22–26)
HCO3 UR-SCNC: 28.4 MMOL/L (ref 22–26)
HCT VFR BLD AUTO: 34 % (ref 40–51)
HCT VFR BLD AUTO: 36.3 % (ref 40–51)
HGB BLD-MCNC: 10.8 GM/DL (ref 13.5–17.5)
HGB BLD-MCNC: 11.4 GM/DL (ref 13.5–17.5)
IMM GRANULOCYTES # BLD AUTO: 0.03 10*3/UL
IMM GRANULOCYTES # BLD AUTO: 0.08 10*3/UL
IMM GRANULOCYTES NFR BLD AUTO: 0 %
IMM GRANULOCYTES NFR BLD AUTO: 1 %
LYMPHOCYTES # BLD AUTO: 1.19 X10(3)/MCL
LYMPHOCYTES # BLD AUTO: 1.96 X10(3)/MCL
LYMPHOCYTES NFR BLD AUTO: 18 % (ref 13–40)
LYMPHOCYTES NFR BLD AUTO: 9 % (ref 13–40)
MCH RBC QN AUTO: 25.8 PG (ref 26–34)
MCH RBC QN AUTO: 26.3 PG (ref 26–34)
MCHC RBC AUTO-ENTMCNC: 31.4 GM/DL (ref 31–37)
MCHC RBC AUTO-ENTMCNC: 31.8 GM/DL (ref 31–37)
MCV RBC AUTO: 82.1 FL (ref 80–100)
MCV RBC AUTO: 82.7 FL (ref 80–100)
MONOCYTES # BLD AUTO: 1.2 X10(3)/MCL
MONOCYTES # BLD AUTO: 1.22 X10(3)/MCL
MONOCYTES NFR BLD AUTO: 11 % (ref 4–12)
MONOCYTES NFR BLD AUTO: 9 % (ref 4–12)
NEUTROPHILS # BLD AUTO: 10.76 X10(3)/MCL
NEUTROPHILS # BLD AUTO: 7.35 X10(3)/MCL
NEUTROPHILS NFR BLD AUTO: 69 X10(3)/MCL
NEUTROPHILS NFR BLD AUTO: 82 X10(3)/MCL
O2 HGB ARTERIAL: 95.5 % (ref 94–100)
O2 HGB ARTERIAL: 97.2 % (ref 94–100)
PCO2 BLDA: 34 MMHG (ref 35–45)
PCO2 BLDA: 34 MMHG (ref 35–45)
PH SMN: 7.47 [PH] (ref 7.35–7.45)
PH SMN: 7.53 [PH] (ref 7.35–7.45)
PLATELET # BLD AUTO: 256 X10(3)/MCL (ref 130–400)
PLATELET # BLD AUTO: 259 X10(3)/MCL (ref 130–400)
PMV BLD AUTO: 9.7 FL (ref 7.4–10.4)
PMV BLD AUTO: 9.9 FL (ref 7.4–10.4)
PO2 BLDA: 136 MMHG (ref 75–100)
PO2 BLDA: 81 MMHG (ref 75–100)
POC ALLENS TEST: ABNORMAL
POC ALLENS TEST: ABNORMAL
POC BE: 1.3 (ref -2–2)
POC BE: 5.6 (ref -2–2)
POC CO HGB: 2 % (ref 0.5–1.5)
POC CO HGB: 2.4 % (ref 0.5–1.5)
POC CO2: 25.7 MMOL/L (ref 22–27)
POC CO2: 29.4 MMOL/L (ref 22–27)
POC MET HGB: 1 % (ref 0–1.5)
POC MET HGB: 1.1 % (ref 0–1.5)
POC SAMPLESOURCE: ABNORMAL
POC SAMPLESOURCE: ABNORMAL
POC SATURATED O2: 100.2 % (ref 95–98)
POC SATURATED O2: 99 % (ref 95–98)
POC SITE: ABNORMAL
POC SITE: ABNORMAL
POC THB: 10.2 GM/DL (ref 12–18)
POC THB: 11.1 GM/DL (ref 12–18)
POC TREATMENT: ABNORMAL
POC TREATMENT: ABNORMAL
POTASSIUM SERPL-SCNC: 3.4 MMOL/L (ref 3.5–5.1)
POTASSIUM SERPL-SCNC: 3.7 MMOL/L (ref 3.5–5.1)
PROT SERPL-MCNC: 6.4 GM/DL (ref 6.4–8.2)
RBC # BLD AUTO: 4.11 X10(6)/MCL (ref 4.5–5.9)
RBC # BLD AUTO: 4.42 X10(6)/MCL (ref 4.5–5.9)
SETTING 1 ABG: ABNORMAL
SETTING 1 ABG: ABNORMAL
SETTING 2 ABG: ABNORMAL
SETTING 3 ABG: ABNORMAL
SODIUM SERPL-SCNC: 140 MMOL/L (ref 136–145)
SODIUM SERPL-SCNC: 142 MMOL/L (ref 136–145)
WBC # SPEC AUTO: 10.6 X10(3)/MCL (ref 4.5–11)
WBC # SPEC AUTO: 13.2 X10(3)/MCL (ref 4.5–11)

## 2018-05-23 LAB
ABS NEUT (OLG): 7.99 X10(3)/MCL (ref 2.1–9.2)
ALBUMIN SERPL-MCNC: 2.7 GM/DL (ref 3.4–5)
ALBUMIN/GLOB SERPL: 1 RATIO (ref 1–2)
ALP SERPL-CCNC: 48 UNIT/L (ref 45–117)
ALT SERPL-CCNC: 29 UNIT/L (ref 12–78)
AST SERPL-CCNC: 28 UNIT/L (ref 15–37)
BASOPHILS # BLD AUTO: 0.01 X10(3)/MCL
BASOPHILS NFR BLD AUTO: 0 %
BILIRUB SERPL-MCNC: 1.1 MG/DL (ref 0.2–1)
BILIRUBIN DIRECT+TOT PNL SERPL-MCNC: 0.5 MG/DL
BILIRUBIN DIRECT+TOT PNL SERPL-MCNC: 0.6 MG/DL
BUN SERPL-MCNC: 15 MG/DL (ref 7–18)
CALCIUM SERPL-MCNC: 8.1 MG/DL (ref 8.5–10.1)
CHLORIDE SERPL-SCNC: 105 MMOL/L (ref 98–107)
CO2 SERPL-SCNC: 28 MMOL/L (ref 21–32)
CREAT SERPL-MCNC: 0.9 MG/DL (ref 0.6–1.3)
EOSINOPHIL # BLD AUTO: 0.01 10*3/UL
EOSINOPHIL NFR BLD AUTO: 0 %
ERYTHROCYTE [DISTWIDTH] IN BLOOD BY AUTOMATED COUNT: 24 % (ref 11.5–14.5)
FINAL CULTURE: NORMAL
FINAL CULTURE: NORMAL
GLOBULIN SER-MCNC: 3.6 GM/ML (ref 2.3–3.5)
GLUCOSE SERPL-MCNC: 86 MG/DL (ref 74–106)
HCT VFR BLD AUTO: 33.5 % (ref 40–51)
HGB BLD-MCNC: 10.7 GM/DL (ref 13.5–17.5)
IMM GRANULOCYTES # BLD AUTO: 0.08 10*3/UL
IMM GRANULOCYTES NFR BLD AUTO: 1 %
LYMPHOCYTES # BLD AUTO: 1.33 X10(3)/MCL
LYMPHOCYTES NFR BLD AUTO: 12 % (ref 13–40)
MCH RBC QN AUTO: 26.4 PG (ref 26–34)
MCHC RBC AUTO-ENTMCNC: 31.9 GM/DL (ref 31–37)
MCV RBC AUTO: 82.7 FL (ref 80–100)
MONOCYTES # BLD AUTO: 1.37 X10(3)/MCL
MONOCYTES NFR BLD AUTO: 13 % (ref 4–12)
NEUTROPHILS # BLD AUTO: 7.99 X10(3)/MCL
NEUTROPHILS NFR BLD AUTO: 74 X10(3)/MCL
PLATELET # BLD AUTO: 228 X10(3)/MCL (ref 130–400)
PMV BLD AUTO: 10 FL (ref 7.4–10.4)
POTASSIUM SERPL-SCNC: 3.4 MMOL/L (ref 3.5–5.1)
PROT SERPL-MCNC: 6.3 GM/DL (ref 6.4–8.2)
RBC # BLD AUTO: 4.05 X10(6)/MCL (ref 4.5–5.9)
SODIUM SERPL-SCNC: 140 MMOL/L (ref 136–145)
WBC # SPEC AUTO: 10.8 X10(3)/MCL (ref 4.5–11)

## 2018-05-24 LAB
ABS NEUT (OLG): 6.9 X10(3)/MCL (ref 2.1–9.2)
ALBUMIN SERPL-MCNC: 2.8 GM/DL (ref 3.4–5)
ALBUMIN/GLOB SERPL: 1 RATIO (ref 1–2)
ALP SERPL-CCNC: 50 UNIT/L (ref 45–117)
ALT SERPL-CCNC: 35 UNIT/L (ref 12–78)
AST SERPL-CCNC: 43 UNIT/L (ref 15–37)
BASOPHILS # BLD AUTO: 0.01 X10(3)/MCL
BASOPHILS NFR BLD AUTO: 0 %
BILIRUB SERPL-MCNC: 1.4 MG/DL (ref 0.2–1)
BILIRUBIN DIRECT+TOT PNL SERPL-MCNC: 0.5 MG/DL
BILIRUBIN DIRECT+TOT PNL SERPL-MCNC: 0.9 MG/DL
BUN SERPL-MCNC: 14 MG/DL (ref 7–18)
CALCIUM SERPL-MCNC: 8.4 MG/DL (ref 8.5–10.1)
CHLORIDE SERPL-SCNC: 106 MMOL/L (ref 98–107)
CO2 SERPL-SCNC: 26 MMOL/L (ref 21–32)
CREAT SERPL-MCNC: 0.9 MG/DL (ref 0.6–1.3)
EOSINOPHIL # BLD AUTO: 0.09 10*3/UL
EOSINOPHIL NFR BLD AUTO: 1 %
ERYTHROCYTE [DISTWIDTH] IN BLOOD BY AUTOMATED COUNT: 23.9 % (ref 11.5–14.5)
GLOBULIN SER-MCNC: 3.8 GM/ML (ref 2.3–3.5)
GLUCOSE SERPL-MCNC: 75 MG/DL (ref 74–106)
HCT VFR BLD AUTO: 32.3 % (ref 40–51)
HGB BLD-MCNC: 10.3 GM/DL (ref 13.5–17.5)
IMM GRANULOCYTES # BLD AUTO: 0.06 10*3/UL
IMM GRANULOCYTES NFR BLD AUTO: 1 %
LYMPHOCYTES # BLD AUTO: 1.16 X10(3)/MCL
LYMPHOCYTES NFR BLD AUTO: 13 % (ref 13–40)
MCH RBC QN AUTO: 26.4 PG (ref 26–34)
MCHC RBC AUTO-ENTMCNC: 31.9 GM/DL (ref 31–37)
MCV RBC AUTO: 82.8 FL (ref 80–100)
MONOCYTES # BLD AUTO: 0.99 X10(3)/MCL
MONOCYTES NFR BLD AUTO: 11 % (ref 4–12)
NEUTROPHILS # BLD AUTO: 6.9 X10(3)/MCL
NEUTROPHILS NFR BLD AUTO: 75 X10(3)/MCL
PLATELET # BLD AUTO: 222 X10(3)/MCL (ref 130–400)
PMV BLD AUTO: 10.1 FL (ref 7.4–10.4)
POTASSIUM SERPL-SCNC: 3.7 MMOL/L (ref 3.5–5.1)
PROT SERPL-MCNC: 6.6 GM/DL (ref 6.4–8.2)
RBC # BLD AUTO: 3.9 X10(6)/MCL (ref 4.5–5.9)
SODIUM SERPL-SCNC: 140 MMOL/L (ref 136–145)
WBC # SPEC AUTO: 9.2 X10(3)/MCL (ref 4.5–11)

## 2018-05-25 LAB
ABS NEUT (OLG): 5.36 X10(3)/MCL (ref 2.1–9.2)
ALBUMIN SERPL-MCNC: 2.7 GM/DL (ref 3.4–5)
ALBUMIN/GLOB SERPL: 1 RATIO (ref 1–2)
ALP SERPL-CCNC: 51 UNIT/L (ref 45–117)
ALT SERPL-CCNC: 36 UNIT/L (ref 12–78)
AST SERPL-CCNC: 32 UNIT/L (ref 15–37)
BASOPHILS # BLD AUTO: 0.01 X10(3)/MCL
BASOPHILS NFR BLD AUTO: 0 %
BILIRUB SERPL-MCNC: 1.2 MG/DL (ref 0.2–1)
BILIRUBIN DIRECT+TOT PNL SERPL-MCNC: 0.5 MG/DL
BILIRUBIN DIRECT+TOT PNL SERPL-MCNC: 0.7 MG/DL
BUN SERPL-MCNC: 16 MG/DL (ref 7–18)
BUN SERPL-MCNC: 17 MG/DL (ref 7–18)
CALCIUM SERPL-MCNC: 8.1 MG/DL (ref 8.5–10.1)
CALCIUM SERPL-MCNC: 8.5 MG/DL (ref 8.5–10.1)
CHLORIDE SERPL-SCNC: 105 MMOL/L (ref 98–107)
CHLORIDE SERPL-SCNC: 105 MMOL/L (ref 98–107)
CO2 SERPL-SCNC: 25 MMOL/L (ref 21–32)
CO2 SERPL-SCNC: 28 MMOL/L (ref 21–32)
CORTIS 1H P CHAL SERPL-SCNC: 26 MCG/DL
CORTIS 30M P CHAL SERPL-SCNC: 21 MCG/DL
CORTIS SERPL-SCNC: 16 MCG/DL
CREAT SERPL-MCNC: 1 MG/DL (ref 0.6–1.3)
CREAT SERPL-MCNC: 1 MG/DL (ref 0.6–1.3)
CREAT/UREA NIT SERPL: 17
EOSINOPHIL # BLD AUTO: 0.09 10*3/UL
EOSINOPHIL NFR BLD AUTO: 1 %
ERYTHROCYTE [DISTWIDTH] IN BLOOD BY AUTOMATED COUNT: 24 % (ref 11.5–14.5)
GLOBULIN SER-MCNC: 3.7 GM/ML (ref 2.3–3.5)
GLUCOSE SERPL-MCNC: 153 MG/DL (ref 74–106)
GLUCOSE SERPL-MCNC: 82 MG/DL (ref 74–106)
HCT VFR BLD AUTO: 30.1 % (ref 40–51)
HGB BLD-MCNC: 9.6 GM/DL (ref 13.5–17.5)
IMM GRANULOCYTES # BLD AUTO: 0.03 10*3/UL
IMM GRANULOCYTES NFR BLD AUTO: 0 %
LYMPHOCYTES # BLD AUTO: 1.26 X10(3)/MCL
LYMPHOCYTES NFR BLD AUTO: 17 % (ref 13–40)
MCH RBC QN AUTO: 26.7 PG (ref 26–34)
MCHC RBC AUTO-ENTMCNC: 31.9 GM/DL (ref 31–37)
MCV RBC AUTO: 83.6 FL (ref 80–100)
MONOCYTES # BLD AUTO: 0.63 X10(3)/MCL
MONOCYTES NFR BLD AUTO: 8 % (ref 4–12)
NEUTROPHILS # BLD AUTO: 5.36 X10(3)/MCL
NEUTROPHILS NFR BLD AUTO: 73 X10(3)/MCL
PLATELET # BLD AUTO: 228 X10(3)/MCL (ref 130–400)
PMV BLD AUTO: 10.2 FL (ref 7.4–10.4)
POTASSIUM SERPL-SCNC: 3.1 MMOL/L (ref 3.5–5.1)
POTASSIUM SERPL-SCNC: 4.1 MMOL/L (ref 3.5–5.1)
PROT SERPL-MCNC: 6.4 GM/DL (ref 6.4–8.2)
RBC # BLD AUTO: 3.6 X10(6)/MCL (ref 4.5–5.9)
SODIUM SERPL-SCNC: 139 MMOL/L (ref 136–145)
SODIUM SERPL-SCNC: 141 MMOL/L (ref 136–145)
TSH SERPL-ACNC: 2.12 MIU/L (ref 0.36–3.74)
WBC # SPEC AUTO: 7.4 X10(3)/MCL (ref 4.5–11)

## 2018-05-26 LAB
ABS NEUT (OLG): 5.97 X10(3)/MCL (ref 2.1–9.2)
ALBUMIN SERPL-MCNC: 3.1 GM/DL (ref 3.4–5)
ALBUMIN/GLOB SERPL: 1 RATIO (ref 1–2)
ALP SERPL-CCNC: 55 UNIT/L (ref 45–117)
ALT SERPL-CCNC: 42 UNIT/L (ref 12–78)
AST SERPL-CCNC: 39 UNIT/L (ref 15–37)
BASOPHILS # BLD AUTO: 0.02 X10(3)/MCL
BASOPHILS NFR BLD AUTO: 0 %
BILIRUB SERPL-MCNC: 1.4 MG/DL (ref 0.2–1)
BILIRUBIN DIRECT+TOT PNL SERPL-MCNC: 0.5 MG/DL
BILIRUBIN DIRECT+TOT PNL SERPL-MCNC: 0.9 MG/DL
BUN SERPL-MCNC: 19 MG/DL (ref 7–18)
CALCIUM SERPL-MCNC: 8.5 MG/DL (ref 8.5–10.1)
CHLORIDE SERPL-SCNC: 107 MMOL/L (ref 98–107)
CO2 SERPL-SCNC: 24 MMOL/L (ref 21–32)
CREAT SERPL-MCNC: 1 MG/DL (ref 0.6–1.3)
EOSINOPHIL # BLD AUTO: 0.08 10*3/UL
EOSINOPHIL NFR BLD AUTO: 1 %
ERYTHROCYTE [DISTWIDTH] IN BLOOD BY AUTOMATED COUNT: 24.9 % (ref 11.5–14.5)
GLOBULIN SER-MCNC: 3.9 GM/ML (ref 2.3–3.5)
GLUCOSE SERPL-MCNC: 91 MG/DL (ref 74–106)
HCT VFR BLD AUTO: 32.4 % (ref 40–51)
HGB BLD-MCNC: 10 GM/DL (ref 13.5–17.5)
IMM GRANULOCYTES # BLD AUTO: 0.04 10*3/UL
IMM GRANULOCYTES NFR BLD AUTO: 0 %
LYMPHOCYTES # BLD AUTO: 1.7 X10(3)/MCL
LYMPHOCYTES NFR BLD AUTO: 20 % (ref 13–40)
MCH RBC QN AUTO: 26.6 PG (ref 26–34)
MCHC RBC AUTO-ENTMCNC: 30.9 GM/DL (ref 31–37)
MCV RBC AUTO: 86.2 FL (ref 80–100)
MONOCYTES # BLD AUTO: 0.81 X10(3)/MCL
MONOCYTES NFR BLD AUTO: 9 % (ref 4–12)
NEUTROPHILS # BLD AUTO: 5.97 X10(3)/MCL
NEUTROPHILS NFR BLD AUTO: 69 X10(3)/MCL
PLATELET # BLD AUTO: 244 X10(3)/MCL (ref 130–400)
PMV BLD AUTO: 9.7 FL (ref 7.4–10.4)
POTASSIUM SERPL-SCNC: 4.2 MMOL/L (ref 3.5–5.1)
PROT SERPL-MCNC: 7 GM/DL (ref 6.4–8.2)
RBC # BLD AUTO: 3.76 X10(6)/MCL (ref 4.5–5.9)
SODIUM SERPL-SCNC: 141 MMOL/L (ref 136–145)
WBC # SPEC AUTO: 8.6 X10(3)/MCL (ref 4.5–11)

## 2018-05-27 LAB
ABS NEUT (OLG): 4.45 X10(3)/MCL (ref 2.1–9.2)
ALBUMIN SERPL-MCNC: 3 GM/DL (ref 3.4–5)
ALBUMIN/GLOB SERPL: 1 RATIO (ref 1–2)
ALP SERPL-CCNC: 55 UNIT/L (ref 45–117)
ALT SERPL-CCNC: 39 UNIT/L (ref 12–78)
AST SERPL-CCNC: 33 UNIT/L (ref 15–37)
BASOPHILS # BLD AUTO: 0.02 X10(3)/MCL
BASOPHILS NFR BLD AUTO: 0 %
BILIRUB SERPL-MCNC: 1.7 MG/DL (ref 0.2–1)
BILIRUBIN DIRECT+TOT PNL SERPL-MCNC: 0.6 MG/DL
BILIRUBIN DIRECT+TOT PNL SERPL-MCNC: 1.1 MG/DL
BUN SERPL-MCNC: 25 MG/DL (ref 7–18)
CALCIUM SERPL-MCNC: 8.4 MG/DL (ref 8.5–10.1)
CHLORIDE SERPL-SCNC: 107 MMOL/L (ref 98–107)
CO2 SERPL-SCNC: 25 MMOL/L (ref 21–32)
CREAT SERPL-MCNC: 1.2 MG/DL (ref 0.6–1.3)
EOSINOPHIL # BLD AUTO: 0.05 X10(3)/MCL
EOSINOPHIL NFR BLD AUTO: 1 %
ERYTHROCYTE [DISTWIDTH] IN BLOOD BY AUTOMATED COUNT: 24.2 % (ref 11.5–14.5)
GLOBULIN SER-MCNC: 3.6 GM/ML (ref 2.3–3.5)
GLUCOSE SERPL-MCNC: 95 MG/DL (ref 74–106)
HCT VFR BLD AUTO: 30.7 % (ref 40–51)
HGB BLD-MCNC: 9.5 GM/DL (ref 13.5–17.5)
IMM GRANULOCYTES # BLD AUTO: 0.02 10*3/UL
IMM GRANULOCYTES NFR BLD AUTO: 0 %
LYMPHOCYTES # BLD AUTO: 1.52 X10(3)/MCL
LYMPHOCYTES NFR BLD AUTO: 23 % (ref 13–40)
MCH RBC QN AUTO: 26.2 PG (ref 26–34)
MCHC RBC AUTO-ENTMCNC: 30.9 GM/DL (ref 31–37)
MCV RBC AUTO: 84.6 FL (ref 80–100)
MONOCYTES # BLD AUTO: 0.65 X10(3)/MCL
MONOCYTES NFR BLD AUTO: 10 % (ref 4–12)
NEUTROPHILS # BLD AUTO: 4.45 X10(3)/MCL
NEUTROPHILS NFR BLD AUTO: 66 X10(3)/MCL
PLATELET # BLD AUTO: 248 X10(3)/MCL (ref 130–400)
PMV BLD AUTO: 10.2 FL (ref 7.4–10.4)
POTASSIUM SERPL-SCNC: 4.3 MMOL/L (ref 3.5–5.1)
PROT SERPL-MCNC: 6.6 GM/DL (ref 6.4–8.2)
RBC # BLD AUTO: 3.63 X10(6)/MCL (ref 4.5–5.9)
SODIUM SERPL-SCNC: 141 MMOL/L (ref 136–145)
WBC # SPEC AUTO: 6.7 X10(3)/MCL (ref 4.5–11)

## 2018-05-28 LAB
ABS NEUT (OLG): 4.06 X10(3)/MCL (ref 2.1–9.2)
ALBUMIN SERPL-MCNC: 2.9 GM/DL (ref 3.4–5)
ALBUMIN/GLOB SERPL: 1 RATIO (ref 1–2)
ALP SERPL-CCNC: 53 UNIT/L (ref 45–117)
ALT SERPL-CCNC: 44 UNIT/L (ref 12–78)
AST SERPL-CCNC: 44 UNIT/L (ref 15–37)
BASOPHILS # BLD AUTO: 0.02 X10(3)/MCL
BASOPHILS NFR BLD AUTO: 0 %
BILIRUB SERPL-MCNC: 1.7 MG/DL (ref 0.2–1)
BILIRUBIN DIRECT+TOT PNL SERPL-MCNC: 0.7 MG/DL
BILIRUBIN DIRECT+TOT PNL SERPL-MCNC: 1 MG/DL
BUN SERPL-MCNC: 25 MG/DL (ref 7–18)
CALCIUM SERPL-MCNC: 8.4 MG/DL (ref 8.5–10.1)
CHLORIDE SERPL-SCNC: 105 MMOL/L (ref 98–107)
CO2 SERPL-SCNC: 23 MMOL/L (ref 21–32)
CREAT SERPL-MCNC: 1.2 MG/DL (ref 0.6–1.3)
EOSINOPHIL # BLD AUTO: 0.02 10*3/UL
EOSINOPHIL NFR BLD AUTO: 0 %
ERYTHROCYTE [DISTWIDTH] IN BLOOD BY AUTOMATED COUNT: 24.3 % (ref 11.5–14.5)
GLOBULIN SER-MCNC: 3.4 GM/ML (ref 2.3–3.5)
GLUCOSE SERPL-MCNC: 80 MG/DL (ref 74–106)
HCT VFR BLD AUTO: 29 % (ref 40–51)
HGB BLD-MCNC: 9.3 GM/DL (ref 13.5–17.5)
IMM GRANULOCYTES # BLD AUTO: 0.03 10*3/UL
IMM GRANULOCYTES NFR BLD AUTO: 0 %
LYMPHOCYTES # BLD AUTO: 1 X10(3)/MCL
LYMPHOCYTES NFR BLD AUTO: 17 % (ref 13–40)
MCH RBC QN AUTO: 26.9 PG (ref 26–34)
MCHC RBC AUTO-ENTMCNC: 32.1 GM/DL (ref 31–37)
MCV RBC AUTO: 83.8 FL (ref 80–100)
MONOCYTES # BLD AUTO: 0.91 X10(3)/MCL
MONOCYTES NFR BLD AUTO: 15 % (ref 4–12)
NEUTROPHILS # BLD AUTO: 4.06 X10(3)/MCL
NEUTROPHILS NFR BLD AUTO: 67 X10(3)/MCL
PLATELET # BLD AUTO: 258 X10(3)/MCL (ref 130–400)
PMV BLD AUTO: 10.2 FL (ref 7.4–10.4)
POTASSIUM SERPL-SCNC: 4.2 MMOL/L (ref 3.5–5.1)
PROT SERPL-MCNC: 6.3 GM/DL (ref 6.4–8.2)
RBC # BLD AUTO: 3.46 X10(6)/MCL (ref 4.5–5.9)
SODIUM SERPL-SCNC: 139 MMOL/L (ref 136–145)
WBC # SPEC AUTO: 6 X10(3)/MCL (ref 4.5–11)

## 2018-05-29 LAB
ABS NEUT (OLG): 3.93 X10(3)/MCL (ref 2.1–9.2)
ALBUMIN SERPL-MCNC: 2.7 GM/DL (ref 3.4–5)
ALBUMIN/GLOB SERPL: 1 RATIO (ref 1–2)
ALP SERPL-CCNC: 49 UNIT/L (ref 45–117)
ALT SERPL-CCNC: 43 UNIT/L (ref 12–78)
AST SERPL-CCNC: 33 UNIT/L (ref 15–37)
BASOPHILS # BLD AUTO: 0.01 X10(3)/MCL
BASOPHILS NFR BLD AUTO: 0 %
BILIRUB SERPL-MCNC: 1.4 MG/DL (ref 0.2–1)
BILIRUBIN DIRECT+TOT PNL SERPL-MCNC: 0.7 MG/DL
BILIRUBIN DIRECT+TOT PNL SERPL-MCNC: 0.7 MG/DL
BUN SERPL-MCNC: 23 MG/DL (ref 7–18)
CALCIUM SERPL-MCNC: 8 MG/DL (ref 8.5–10.1)
CHLORIDE SERPL-SCNC: 106 MMOL/L (ref 98–107)
CO2 SERPL-SCNC: 24 MMOL/L (ref 21–32)
CREAT SERPL-MCNC: 1.3 MG/DL (ref 0.6–1.3)
ERYTHROCYTE [DISTWIDTH] IN BLOOD BY AUTOMATED COUNT: 24.7 % (ref 11.5–14.5)
GLOBULIN SER-MCNC: 3.3 GM/ML (ref 2.3–3.5)
GLUCOSE SERPL-MCNC: 96 MG/DL (ref 74–106)
HCT VFR BLD AUTO: 29.9 % (ref 40–51)
HGB BLD-MCNC: 9.4 GM/DL (ref 13.5–17.5)
IMM GRANULOCYTES # BLD AUTO: 0.02 10*3/UL
IMM GRANULOCYTES NFR BLD AUTO: 0 %
LYMPHOCYTES # BLD AUTO: 1.05 X10(3)/MCL
LYMPHOCYTES NFR BLD AUTO: 18 % (ref 13–40)
MCH RBC QN AUTO: 26.4 PG (ref 26–34)
MCHC RBC AUTO-ENTMCNC: 31.4 GM/DL (ref 31–37)
MCV RBC AUTO: 84 FL (ref 80–100)
MONOCYTES # BLD AUTO: 0.9 X10(3)/MCL
MONOCYTES NFR BLD AUTO: 15 % (ref 4–12)
NEUTROPHILS # BLD AUTO: 3.93 X10(3)/MCL
NEUTROPHILS NFR BLD AUTO: 66 X10(3)/MCL
PLATELET # BLD AUTO: 256 X10(3)/MCL (ref 130–400)
PMV BLD AUTO: 10.3 FL (ref 7.4–10.4)
POTASSIUM SERPL-SCNC: 4.4 MMOL/L (ref 3.5–5.1)
PROT SERPL-MCNC: 6 GM/DL (ref 6.4–8.2)
RBC # BLD AUTO: 3.56 X10(6)/MCL (ref 4.5–5.9)
SODIUM SERPL-SCNC: 140 MMOL/L (ref 136–145)
WBC # SPEC AUTO: 5.9 X10(3)/MCL (ref 4.5–11)

## 2018-07-12 ENCOUNTER — HISTORICAL (OUTPATIENT)
Dept: ADMINISTRATIVE | Facility: HOSPITAL | Age: 58
End: 2018-07-12

## 2018-07-12 LAB
ABS NEUT (OLG): 3.34 X10(3)/MCL (ref 2.1–9.2)
ALBUMIN SERPL-MCNC: 3.4 GM/DL (ref 3.4–5)
ALBUMIN/GLOB SERPL: 1 RATIO (ref 1–2)
ALP SERPL-CCNC: 74 UNIT/L (ref 45–117)
ALT SERPL-CCNC: 28 UNIT/L (ref 12–78)
AMPHET UR QL SCN: NEGATIVE
AST SERPL-CCNC: 29 UNIT/L (ref 15–37)
BARBITURATE SCN PRESENT UR: NEGATIVE
BASOPHILS # BLD AUTO: 0.02 X10(3)/MCL
BASOPHILS NFR BLD AUTO: 0 %
BENZODIAZ UR QL SCN: NEGATIVE
BILIRUB SERPL-MCNC: 1.4 MG/DL (ref 0.2–1)
BILIRUBIN DIRECT+TOT PNL SERPL-MCNC: 0.5 MG/DL
BILIRUBIN DIRECT+TOT PNL SERPL-MCNC: 0.9 MG/DL
BUN SERPL-MCNC: 19 MG/DL (ref 7–18)
CALCIUM SERPL-MCNC: 8.3 MG/DL (ref 8.5–10.1)
CANNABINOIDS UR QL SCN: NEGATIVE
CHLORIDE SERPL-SCNC: 101 MMOL/L (ref 98–107)
CK MB SERPL-MCNC: <1 NG/ML (ref 1–3.6)
CK SERPL-CCNC: 82 UNIT/L (ref 39–308)
CO2 SERPL-SCNC: 30 MMOL/L (ref 21–32)
COCAINE UR QL SCN: POSITIVE
CREAT SERPL-MCNC: 1.4 MG/DL (ref 0.6–1.3)
EOSINOPHIL # BLD AUTO: 0.04 X10(3)/MCL
EOSINOPHIL NFR BLD AUTO: 1 %
ERYTHROCYTE [DISTWIDTH] IN BLOOD BY AUTOMATED COUNT: 20.5 % (ref 11.5–14.5)
GLOBULIN SER-MCNC: 4.3 GM/ML (ref 2.3–3.5)
GLUCOSE SERPL-MCNC: 99 MG/DL (ref 74–106)
HCT VFR BLD AUTO: 29.8 % (ref 40–51)
HGB BLD-MCNC: 9.7 GM/DL (ref 13.5–17.5)
IMM GRANULOCYTES # BLD AUTO: 0.01 10*3/UL
IMM GRANULOCYTES NFR BLD AUTO: 0 %
INR PPP: 1.3 (ref 0.9–1.2)
LYMPHOCYTES # BLD AUTO: 1.81 X10(3)/MCL
LYMPHOCYTES NFR BLD AUTO: 32 % (ref 13–40)
MCH RBC QN AUTO: 27.1 PG (ref 26–34)
MCHC RBC AUTO-ENTMCNC: 32.6 GM/DL (ref 31–37)
MCV RBC AUTO: 83.2 FL (ref 80–100)
MONOCYTES # BLD AUTO: 0.47 X10(3)/MCL
MONOCYTES NFR BLD AUTO: 8 % (ref 4–12)
NEUTROPHILS # BLD AUTO: 3.34 X10(3)/MCL
NEUTROPHILS NFR BLD AUTO: 59 X10(3)/MCL
OPIATES UR QL SCN: NEGATIVE
PCP UR QL: NEGATIVE
PH UR STRIP.AUTO: 5.5 [PH] (ref 5–8)
PLATELET # BLD AUTO: 284 X10(3)/MCL (ref 130–400)
PMV BLD AUTO: 9.6 FL (ref 7.4–10.4)
POC TROPONIN: 0.08 NG/ML (ref 0–0.08)
POTASSIUM SERPL-SCNC: 3.8 MMOL/L (ref 3.5–5.1)
PROT SERPL-MCNC: 7.7 GM/DL (ref 6.4–8.2)
PROTHROMBIN TIME: 15.4 SECOND(S) (ref 11.9–14.4)
RBC # BLD AUTO: 3.58 X10(6)/MCL (ref 4.5–5.9)
SODIUM SERPL-SCNC: 138 MMOL/L (ref 136–145)
TEMPERATURE, URINE (OHS): 23 DEGC (ref 20–25)
TSH SERPL-ACNC: 2.41 MIU/L (ref 0.36–3.74)
WBC # SPEC AUTO: 5.7 X10(3)/MCL (ref 4.5–11)

## 2018-07-13 LAB
ABS NEUT (OLG): 2.89 X10(3)/MCL (ref 2.1–9.2)
ALBUMIN SERPL-MCNC: 3.3 GM/DL (ref 3.4–5)
ALBUMIN/GLOB SERPL: 1 RATIO (ref 1–2)
ALP SERPL-CCNC: 76 UNIT/L (ref 45–117)
ALT SERPL-CCNC: 26 UNIT/L (ref 12–78)
AST SERPL-CCNC: 27 UNIT/L (ref 15–37)
BASOPHILS # BLD AUTO: 0.02 X10(3)/MCL
BASOPHILS NFR BLD AUTO: 0 %
BILIRUB SERPL-MCNC: 1.3 MG/DL (ref 0.2–1)
BILIRUBIN DIRECT+TOT PNL SERPL-MCNC: 0.4 MG/DL
BILIRUBIN DIRECT+TOT PNL SERPL-MCNC: 0.9 MG/DL
BUN SERPL-MCNC: 23 MG/DL (ref 7–18)
CALCIUM SERPL-MCNC: 8.2 MG/DL (ref 8.5–10.1)
CHLORIDE SERPL-SCNC: 100 MMOL/L (ref 98–107)
CO2 SERPL-SCNC: 30 MMOL/L (ref 21–32)
CREAT SERPL-MCNC: 1.4 MG/DL (ref 0.6–1.3)
EOSINOPHIL # BLD AUTO: 0.05 X10(3)/MCL
EOSINOPHIL NFR BLD AUTO: 1 %
ERYTHROCYTE [DISTWIDTH] IN BLOOD BY AUTOMATED COUNT: 20.9 % (ref 11.5–14.5)
GLOBULIN SER-MCNC: 4.3 GM/ML (ref 2.3–3.5)
GLUCOSE SERPL-MCNC: 93 MG/DL (ref 74–106)
HCT VFR BLD AUTO: 31.9 % (ref 40–51)
HGB BLD-MCNC: 10.1 GM/DL (ref 13.5–17.5)
IMM GRANULOCYTES # BLD AUTO: 0.01 10*3/UL
IMM GRANULOCYTES NFR BLD AUTO: 0 %
LYMPHOCYTES # BLD AUTO: 1.85 X10(3)/MCL
LYMPHOCYTES NFR BLD AUTO: 35 % (ref 13–40)
MAGNESIUM SERPL-MCNC: 2 MG/DL (ref 1.8–2.4)
MCH RBC QN AUTO: 26.9 PG (ref 26–34)
MCHC RBC AUTO-ENTMCNC: 31.7 GM/DL (ref 31–37)
MCV RBC AUTO: 84.8 FL (ref 80–100)
MONOCYTES # BLD AUTO: 0.54 X10(3)/MCL
MONOCYTES NFR BLD AUTO: 10 % (ref 4–12)
NEUTROPHILS # BLD AUTO: 2.89 X10(3)/MCL
NEUTROPHILS NFR BLD AUTO: 54 X10(3)/MCL
PHOSPHATE SERPL-MCNC: 4.1 MG/DL (ref 2.5–4.9)
PLATELET # BLD AUTO: 296 X10(3)/MCL (ref 130–400)
PMV BLD AUTO: 10 FL (ref 7.4–10.4)
POTASSIUM SERPL-SCNC: 3.8 MMOL/L (ref 3.5–5.1)
PROT SERPL-MCNC: 7.6 GM/DL (ref 6.4–8.2)
RBC # BLD AUTO: 3.76 X10(6)/MCL (ref 4.5–5.9)
SODIUM SERPL-SCNC: 140 MMOL/L (ref 136–145)
WBC # SPEC AUTO: 5.4 X10(3)/MCL (ref 4.5–11)

## 2018-08-04 ENCOUNTER — HISTORICAL (OUTPATIENT)
Dept: ADMINISTRATIVE | Facility: HOSPITAL | Age: 58
End: 2018-08-04

## 2018-08-04 LAB
APPEARANCE, UA: CLEAR
BACTERIA #/AREA URNS AUTO: ABNORMAL /[HPF]
BILIRUB UR QL STRIP: NEGATIVE
COLOR UR: ABNORMAL
GLUCOSE (UA): NORMAL
HGB UR QL STRIP: 0.2 MG/DL
HYALINE CASTS #/AREA URNS LPF: ABNORMAL /[LPF]
KETONES UR QL STRIP: NEGATIVE
LEUKOCYTE ESTERASE UR QL STRIP: 25 LEU/UL
NITRITE UR QL STRIP: NEGATIVE
PH UR STRIP: 5 [PH] (ref 4.5–8)
PROT UR QL STRIP: NEGATIVE
RBC #/AREA URNS AUTO: ABNORMAL /[HPF]
SP GR UR STRIP: 1.01 (ref 1–1.03)
SQUAMOUS #/AREA URNS LPF: ABNORMAL /[LPF]
UROBILINOGEN UR STRIP-ACNC: NORMAL
WBC #/AREA URNS AUTO: ABNORMAL /HPF

## 2018-08-07 ENCOUNTER — HISTORICAL (OUTPATIENT)
Dept: ADMINISTRATIVE | Facility: HOSPITAL | Age: 58
End: 2018-08-07

## 2018-08-07 LAB
ABS NEUT (OLG): 3.18 X10(3)/MCL (ref 2.1–9.2)
ALBUMIN SERPL-MCNC: 3.4 GM/DL (ref 3.4–5)
ALBUMIN/GLOB SERPL: 1 RATIO (ref 1–2)
ALP SERPL-CCNC: 65 UNIT/L (ref 45–117)
ALT SERPL-CCNC: 25 UNIT/L (ref 12–78)
AMPHET UR QL SCN: NEGATIVE
APPEARANCE, UA: CLEAR
APPEARANCE, UA: CLEAR
AST SERPL-CCNC: 32 UNIT/L (ref 15–37)
BACTERIA #/AREA URNS AUTO: ABNORMAL /[HPF]
BACTERIA #/AREA URNS AUTO: ABNORMAL /[HPF]
BARBITURATE SCN PRESENT UR: NEGATIVE
BASOPHILS # BLD AUTO: 0.03 X10(3)/MCL
BASOPHILS NFR BLD AUTO: 0 %
BENZODIAZ UR QL SCN: NEGATIVE
BILIRUB SERPL-MCNC: 1.2 MG/DL (ref 0.2–1)
BILIRUB UR QL STRIP: NEGATIVE
BILIRUB UR QL STRIP: NEGATIVE
BILIRUBIN DIRECT+TOT PNL SERPL-MCNC: 0.5 MG/DL
BILIRUBIN DIRECT+TOT PNL SERPL-MCNC: 0.7 MG/DL
BUN SERPL-MCNC: 20 MG/DL (ref 7–18)
CALCIUM SERPL-MCNC: 8.4 MG/DL (ref 8.5–10.1)
CANNABINOIDS UR QL SCN: NEGATIVE
CHLORIDE SERPL-SCNC: 105 MMOL/L (ref 98–107)
CK MB SERPL-MCNC: 2 NG/ML (ref 1–3.6)
CK SERPL-CCNC: 123 UNIT/L (ref 39–308)
CO2 SERPL-SCNC: 26 MMOL/L (ref 21–32)
COCAINE UR QL SCN: NEGATIVE
COLOR UR: ABNORMAL
COLOR UR: YELLOW
CREAT SERPL-MCNC: 1.2 MG/DL (ref 0.6–1.3)
EOSINOPHIL # BLD AUTO: 0.07 X10(3)/MCL
EOSINOPHIL NFR BLD AUTO: 1 %
ERYTHROCYTE [DISTWIDTH] IN BLOOD BY AUTOMATED COUNT: 19.6 % (ref 11.5–14.5)
GLOBULIN SER-MCNC: 4.1 GM/ML (ref 2.3–3.5)
GLUCOSE (UA): NORMAL
GLUCOSE (UA): NORMAL
GLUCOSE SERPL-MCNC: 87 MG/DL (ref 74–106)
HCT VFR BLD AUTO: 28.3 % (ref 40–51)
HGB BLD-MCNC: 8.9 GM/DL (ref 13.5–17.5)
HGB UR QL STRIP: 0.5 MG/DL
HGB UR QL STRIP: ABNORMAL MG/DL
HYALINE CASTS #/AREA URNS LPF: ABNORMAL /[LPF]
HYALINE CASTS #/AREA URNS LPF: ABNORMAL /[LPF]
IMM GRANULOCYTES # BLD AUTO: 0.01 10*3/UL
IMM GRANULOCYTES NFR BLD AUTO: 0 %
KETONES UR QL STRIP: NEGATIVE
KETONES UR QL STRIP: NEGATIVE
LEUKOCYTE ESTERASE UR QL STRIP: 250 LEU/UL
LEUKOCYTE ESTERASE UR QL STRIP: 500 LEU/UL
LYMPHOCYTES # BLD AUTO: 1.83 X10(3)/MCL
LYMPHOCYTES NFR BLD AUTO: 33 % (ref 13–40)
MCH RBC QN AUTO: 26.6 PG (ref 26–34)
MCHC RBC AUTO-ENTMCNC: 31.4 GM/DL (ref 31–37)
MCV RBC AUTO: 84.5 FL (ref 80–100)
MONOCYTES # BLD AUTO: 0.48 X10(3)/MCL
MONOCYTES NFR BLD AUTO: 9 % (ref 4–12)
NEUTROPHILS # BLD AUTO: 3.18 X10(3)/MCL
NEUTROPHILS NFR BLD AUTO: 57 X10(3)/MCL
NITRITE UR QL STRIP: NEGATIVE
NITRITE UR QL STRIP: NEGATIVE
OPIATES UR QL SCN: NEGATIVE
PCP UR QL: NEGATIVE
PH UR STRIP.AUTO: 6 [PH] (ref 5–8)
PH UR STRIP: 6.5 [PH] (ref 4.5–8)
PH UR STRIP: 6.5 [PH] (ref 4.5–8)
PLATELET # BLD AUTO: 330 X10(3)/MCL (ref 130–400)
PMV BLD AUTO: 9.1 FL (ref 7.4–10.4)
POC TROPONIN: 0.27 NG/ML (ref 0–0.08)
POTASSIUM SERPL-SCNC: 3.4 MMOL/L (ref 3.5–5.1)
PROT SERPL-MCNC: 7.5 GM/DL (ref 6.4–8.2)
PROT UR QL STRIP: 20 MG/DL
PROT UR QL STRIP: 30 MG/DL
RBC # BLD AUTO: 3.35 X10(6)/MCL (ref 4.5–5.9)
RBC #/AREA URNS AUTO: ABNORMAL /[HPF]
RBC #/AREA URNS AUTO: ABNORMAL /[HPF]
SODIUM SERPL-SCNC: 139 MMOL/L (ref 136–145)
SP GR UR STRIP: 1.01 (ref 1–1.03)
SP GR UR STRIP: 1.01 (ref 1–1.03)
SQUAMOUS #/AREA URNS LPF: >100 /[LPF]
SQUAMOUS #/AREA URNS LPF: ABNORMAL /[LPF]
TEMPERATURE, URINE (OHS): 20 DEGC (ref 20–25)
TROPONIN I SERPL-MCNC: 0.24 NG/ML (ref 0–0.05)
TROPONIN I SERPL-MCNC: 0.26 NG/ML (ref 0–0.05)
TROPONIN I SERPL-MCNC: 0.27 NG/ML (ref 0–0.05)
UROBILINOGEN UR STRIP-ACNC: 2 MG/DL
UROBILINOGEN UR STRIP-ACNC: NORMAL
WBC # SPEC AUTO: 5.6 X10(3)/MCL (ref 4.5–11)
WBC #/AREA URNS AUTO: ABNORMAL /HPF
WBC #/AREA URNS AUTO: ABNORMAL /HPF

## 2018-08-08 LAB
ABS NEUT (OLG): 2.82 X10(3)/MCL (ref 2.1–9.2)
ALBUMIN SERPL-MCNC: 3.4 GM/DL (ref 3.4–5)
ALBUMIN/GLOB SERPL: 1 RATIO (ref 1–2)
ALP SERPL-CCNC: 67 UNIT/L (ref 45–117)
ALT SERPL-CCNC: 29 UNIT/L (ref 12–78)
AST SERPL-CCNC: 34 UNIT/L (ref 15–37)
BASOPHILS # BLD AUTO: 0.04 X10(3)/MCL
BASOPHILS NFR BLD AUTO: 1 %
BILIRUB SERPL-MCNC: 1.4 MG/DL (ref 0.2–1)
BILIRUBIN DIRECT+TOT PNL SERPL-MCNC: 0.6 MG/DL
BILIRUBIN DIRECT+TOT PNL SERPL-MCNC: 0.8 MG/DL
BUN SERPL-MCNC: 18 MG/DL (ref 7–18)
BUN SERPL-MCNC: 19 MG/DL (ref 7–18)
CALCIUM SERPL-MCNC: 8.3 MG/DL (ref 8.5–10.1)
CALCIUM SERPL-MCNC: 8.7 MG/DL (ref 8.5–10.1)
CHLORIDE SERPL-SCNC: 100 MMOL/L (ref 98–107)
CHLORIDE SERPL-SCNC: 102 MMOL/L (ref 98–107)
CO2 SERPL-SCNC: 30 MMOL/L (ref 21–32)
CO2 SERPL-SCNC: 30 MMOL/L (ref 21–32)
CREAT SERPL-MCNC: 1.1 MG/DL (ref 0.6–1.3)
CREAT SERPL-MCNC: 1.2 MG/DL (ref 0.6–1.3)
CREAT/UREA NIT SERPL: 16
EOSINOPHIL # BLD AUTO: 0.13 X10(3)/MCL
EOSINOPHIL NFR BLD AUTO: 3 %
ERYTHROCYTE [DISTWIDTH] IN BLOOD BY AUTOMATED COUNT: 19.7 % (ref 11.5–14.5)
GLOBULIN SER-MCNC: 4.5 GM/ML (ref 2.3–3.5)
GLUCOSE SERPL-MCNC: 104 MG/DL (ref 74–106)
GLUCOSE SERPL-MCNC: 70 MG/DL (ref 74–106)
HCT VFR BLD AUTO: 31.3 % (ref 40–51)
HGB BLD-MCNC: 10 GM/DL (ref 13.5–17.5)
IMM GRANULOCYTES # BLD AUTO: 0.01 10*3/UL
IMM GRANULOCYTES NFR BLD AUTO: 0 %
LYMPHOCYTES # BLD AUTO: 1.34 X10(3)/MCL
LYMPHOCYTES NFR BLD AUTO: 28 % (ref 13–40)
MAGNESIUM SERPL-MCNC: 1.8 MG/DL (ref 1.8–2.4)
MAGNESIUM SERPL-MCNC: 2.5 MG/DL (ref 1.8–2.4)
MCH RBC QN AUTO: 26.7 PG (ref 26–34)
MCHC RBC AUTO-ENTMCNC: 31.9 GM/DL (ref 31–37)
MCV RBC AUTO: 83.7 FL (ref 80–100)
MONOCYTES # BLD AUTO: 0.46 X10(3)/MCL
MONOCYTES NFR BLD AUTO: 10 % (ref 4–12)
NEUTROPHILS # BLD AUTO: 2.82 X10(3)/MCL
NEUTROPHILS NFR BLD AUTO: 59 X10(3)/MCL
PHOSPHATE SERPL-MCNC: 3.3 MG/DL (ref 2.5–4.9)
PLATELET # BLD AUTO: 343 X10(3)/MCL (ref 130–400)
PMV BLD AUTO: 8.9 FL (ref 7.4–10.4)
POTASSIUM SERPL-SCNC: 2.9 MMOL/L (ref 3.5–5.1)
POTASSIUM SERPL-SCNC: 3.2 MMOL/L (ref 3.5–5.1)
PROT SERPL-MCNC: 7.9 GM/DL (ref 6.4–8.2)
RBC # BLD AUTO: 3.74 X10(6)/MCL (ref 4.5–5.9)
SODIUM SERPL-SCNC: 140 MMOL/L (ref 136–145)
SODIUM SERPL-SCNC: 141 MMOL/L (ref 136–145)
WBC # SPEC AUTO: 4.8 X10(3)/MCL (ref 4.5–11)

## 2018-08-11 ENCOUNTER — HISTORICAL (OUTPATIENT)
Dept: ADMINISTRATIVE | Facility: HOSPITAL | Age: 58
End: 2018-08-11

## 2018-08-11 LAB
ABS NEUT (OLG): 2.78 X10(3)/MCL (ref 2.1–9.2)
ALBUMIN SERPL-MCNC: 3.5 GM/DL (ref 3.4–5)
ALBUMIN/GLOB SERPL: 1 RATIO (ref 1–2)
ALP SERPL-CCNC: 64 UNIT/L (ref 45–117)
ALT SERPL-CCNC: 26 UNIT/L (ref 12–78)
AST SERPL-CCNC: 29 UNIT/L (ref 15–37)
BASOPHILS # BLD AUTO: 0.03 X10(3)/MCL
BASOPHILS NFR BLD AUTO: 0 %
BILIRUB SERPL-MCNC: 1.3 MG/DL (ref 0.2–1)
BILIRUBIN DIRECT+TOT PNL SERPL-MCNC: 0.5 MG/DL
BILIRUBIN DIRECT+TOT PNL SERPL-MCNC: 0.8 MG/DL
BUN SERPL-MCNC: 21 MG/DL (ref 7–18)
CALCIUM SERPL-MCNC: 8.7 MG/DL (ref 8.5–10.1)
CHLORIDE SERPL-SCNC: 103 MMOL/L (ref 98–107)
CK MB SERPL-MCNC: 1.8 NG/ML (ref 1–3.6)
CK SERPL-CCNC: 119 UNIT/L (ref 39–308)
CO2 SERPL-SCNC: 29 MMOL/L (ref 21–32)
CREAT SERPL-MCNC: 1.4 MG/DL (ref 0.6–1.3)
EOSINOPHIL # BLD AUTO: 0.12 X10(3)/MCL
EOSINOPHIL NFR BLD AUTO: 2 %
ERYTHROCYTE [DISTWIDTH] IN BLOOD BY AUTOMATED COUNT: 20 % (ref 11.5–14.5)
GLOBULIN SER-MCNC: 4.3 GM/ML (ref 2.3–3.5)
GLUCOSE SERPL-MCNC: 93 MG/DL (ref 74–106)
HCT VFR BLD AUTO: 28.9 % (ref 40–51)
HGB BLD-MCNC: 9 GM/DL (ref 13.5–17.5)
IMM GRANULOCYTES # BLD AUTO: 0.02 10*3/UL
IMM GRANULOCYTES NFR BLD AUTO: 0 %
LYMPHOCYTES # BLD AUTO: 2.58 X10(3)/MCL
LYMPHOCYTES NFR BLD AUTO: 43 % (ref 13–40)
MCH RBC QN AUTO: 26.7 PG (ref 26–34)
MCHC RBC AUTO-ENTMCNC: 31.1 GM/DL (ref 31–37)
MCV RBC AUTO: 85.8 FL (ref 80–100)
MONOCYTES # BLD AUTO: 0.42 X10(3)/MCL
MONOCYTES NFR BLD AUTO: 7 % (ref 4–12)
NEUTROPHILS # BLD AUTO: 2.78 X10(3)/MCL
NEUTROPHILS NFR BLD AUTO: 47 X10(3)/MCL
PLATELET # BLD AUTO: 314 X10(3)/MCL (ref 130–400)
PMV BLD AUTO: 9 FL (ref 7.4–10.4)
POC TROPONIN: 0.03 NG/ML (ref 0–0.08)
POTASSIUM SERPL-SCNC: 4.1 MMOL/L (ref 3.5–5.1)
PROT SERPL-MCNC: 7.8 GM/DL (ref 6.4–8.2)
RBC # BLD AUTO: 3.37 X10(6)/MCL (ref 4.5–5.9)
SODIUM SERPL-SCNC: 140 MMOL/L (ref 136–145)
WBC # SPEC AUTO: 6 X10(3)/MCL (ref 4.5–11)

## 2018-08-25 ENCOUNTER — HISTORICAL (OUTPATIENT)
Dept: ADMINISTRATIVE | Facility: HOSPITAL | Age: 58
End: 2018-08-25

## 2018-08-25 LAB
ABS NEUT (OLG): 3.71 X10(3)/MCL (ref 2.1–9.2)
ALBUMIN SERPL-MCNC: 3.3 GM/DL (ref 3.4–5)
ALBUMIN/GLOB SERPL: 1 RATIO (ref 1–2)
ALP SERPL-CCNC: 59 UNIT/L (ref 45–117)
ALT SERPL-CCNC: 23 UNIT/L (ref 12–78)
AMPHET UR QL SCN: NEGATIVE
AST SERPL-CCNC: 22 UNIT/L (ref 15–37)
BARBITURATE SCN PRESENT UR: NEGATIVE
BASOPHILS # BLD AUTO: 0.02 X10(3)/MCL
BASOPHILS NFR BLD AUTO: 0 %
BENZODIAZ UR QL SCN: NEGATIVE
BILIRUB SERPL-MCNC: 0.9 MG/DL (ref 0.2–1)
BILIRUBIN DIRECT+TOT PNL SERPL-MCNC: 0.3 MG/DL
BILIRUBIN DIRECT+TOT PNL SERPL-MCNC: 0.6 MG/DL
BUN SERPL-MCNC: 20 MG/DL (ref 7–18)
CALCIUM SERPL-MCNC: 8.5 MG/DL (ref 8.5–10.1)
CANNABINOIDS UR QL SCN: NEGATIVE
CHLORIDE SERPL-SCNC: 103 MMOL/L (ref 98–107)
CK MB SERPL-MCNC: 2.1 NG/ML (ref 1–3.6)
CK SERPL-CCNC: 82 UNIT/L (ref 39–308)
CO2 SERPL-SCNC: 28 MMOL/L (ref 21–32)
COCAINE UR QL SCN: NEGATIVE
CREAT SERPL-MCNC: 1.1 MG/DL (ref 0.6–1.3)
EOSINOPHIL # BLD AUTO: 0.07 X10(3)/MCL
EOSINOPHIL NFR BLD AUTO: 1 %
ERYTHROCYTE [DISTWIDTH] IN BLOOD BY AUTOMATED COUNT: 19.5 % (ref 11.5–14.5)
GLOBULIN SER-MCNC: 4.3 GM/ML (ref 2.3–3.5)
GLUCOSE SERPL-MCNC: 90 MG/DL (ref 74–106)
HCO3 UR-SCNC: 27.3 MMOL/L (ref 22–26)
HCT VFR BLD AUTO: 27.6 % (ref 40–51)
HGB BLD-MCNC: 8.9 GM/DL (ref 13.5–17.5)
IMM GRANULOCYTES # BLD AUTO: 0.01 10*3/UL
IMM GRANULOCYTES NFR BLD AUTO: 0 %
LYMPHOCYTES # BLD AUTO: 1.96 X10(3)/MCL
LYMPHOCYTES NFR BLD AUTO: 32 % (ref 13–40)
MCH RBC QN AUTO: 26.9 PG (ref 26–34)
MCHC RBC AUTO-ENTMCNC: 32.2 GM/DL (ref 31–37)
MCV RBC AUTO: 83.4 FL (ref 80–100)
MONOCYTES # BLD AUTO: 0.45 X10(3)/MCL
MONOCYTES NFR BLD AUTO: 7 % (ref 4–12)
NEUTROPHILS # BLD AUTO: 3.71 X10(3)/MCL
NEUTROPHILS NFR BLD AUTO: 60 X10(3)/MCL
O2 HGB ARTERIAL: 93.6 % (ref 94–100)
OPIATES UR QL SCN: NEGATIVE
PCO2 BLDA: 35 MMHG (ref 35–45)
PCP UR QL: NEGATIVE
PH SMN: 7.5 [PH] (ref 7.35–7.45)
PH UR STRIP.AUTO: 6 [PH] (ref 5–8)
PLATELET # BLD AUTO: 305 X10(3)/MCL (ref 130–400)
PMV BLD AUTO: 8.7 FL (ref 7.4–10.4)
PO2 BLDA: 71 MMHG (ref 75–100)
POC ALLENS TEST: ABNORMAL
POC BE: 4 (ref -2–2)
POC CO HGB: 2.5 % (ref 0.5–1.5)
POC CO2: 28.4 MMOL/L (ref 22–27)
POC MET HGB: 0.6 % (ref 0–1.5)
POC SAMPLESOURCE: ABNORMAL
POC SATURATED O2: 96.6 % (ref 95–98)
POC SITE: ABNORMAL
POC THB: 9.4 GM/DL (ref 12–18)
POC TREATMENT: ABNORMAL
POC TROPONIN: 0.03 NG/ML (ref 0–0.08)
POTASSIUM SERPL-SCNC: 3.3 MMOL/L (ref 3.5–5.1)
PROT SERPL-MCNC: 7.6 GM/DL (ref 6.4–8.2)
RBC # BLD AUTO: 3.31 X10(6)/MCL (ref 4.5–5.9)
SODIUM SERPL-SCNC: 141 MMOL/L (ref 136–145)
TEMPERATURE, URINE (OHS): 25 DEGC (ref 20–25)
WBC # SPEC AUTO: 6.2 X10(3)/MCL (ref 4.5–11)

## 2018-08-31 ENCOUNTER — HISTORICAL (OUTPATIENT)
Dept: ADMINISTRATIVE | Facility: HOSPITAL | Age: 58
End: 2018-08-31

## 2018-08-31 LAB
ABS NEUT (OLG): 4.3 X10(3)/MCL (ref 2.1–9.2)
ALBUMIN SERPL-MCNC: 3.5 GM/DL (ref 3.4–5)
ALBUMIN/GLOB SERPL: 1 RATIO (ref 1–2)
ALP SERPL-CCNC: 86 UNIT/L (ref 45–117)
ALT SERPL-CCNC: 34 UNIT/L (ref 12–78)
APTT PPP: 31.1 SECOND(S) (ref 23.3–37)
AST SERPL-CCNC: 31 UNIT/L (ref 15–37)
BASOPHILS # BLD AUTO: 0.04 X10(3)/MCL
BASOPHILS NFR BLD AUTO: 1 %
BILIRUB SERPL-MCNC: 1.2 MG/DL (ref 0.2–1)
BILIRUBIN DIRECT+TOT PNL SERPL-MCNC: 0.4 MG/DL
BILIRUBIN DIRECT+TOT PNL SERPL-MCNC: 0.8 MG/DL
BUN SERPL-MCNC: 19 MG/DL (ref 7–18)
CALCIUM SERPL-MCNC: 8.4 MG/DL (ref 8.5–10.1)
CHLORIDE SERPL-SCNC: 102 MMOL/L (ref 98–107)
CK MB SERPL-MCNC: 1.6 NG/ML (ref 1–3.6)
CK MB SERPL-MCNC: 1.8 NG/ML (ref 1–3.6)
CK MB SERPL-MCNC: 2 NG/ML (ref 1–3.6)
CK MB SERPL-MCNC: 2.1 NG/ML (ref 1–3.6)
CK SERPL-CCNC: 104 UNIT/L (ref 39–308)
CK SERPL-CCNC: 136 UNIT/L (ref 39–308)
CK SERPL-CCNC: 87 UNIT/L (ref 39–308)
CK SERPL-CCNC: 98 UNIT/L (ref 39–308)
CO2 SERPL-SCNC: 28 MMOL/L (ref 21–32)
CREAT SERPL-MCNC: 1.4 MG/DL (ref 0.6–1.3)
EOSINOPHIL # BLD AUTO: 0.04 X10(3)/MCL
EOSINOPHIL NFR BLD AUTO: 1 %
ERYTHROCYTE [DISTWIDTH] IN BLOOD BY AUTOMATED COUNT: 19 % (ref 11.5–14.5)
FOLATE SERPL-MCNC: 31.5 NG/ML (ref 3.1–17.5)
GLOBULIN SER-MCNC: 4.7 GM/ML (ref 2.3–3.5)
GLUCOSE SERPL-MCNC: 78 MG/DL (ref 74–106)
HCT VFR BLD AUTO: 29.6 % (ref 40–51)
HGB BLD-MCNC: 9.3 GM/DL (ref 13.5–17.5)
IMM GRANULOCYTES # BLD AUTO: 0.01 10*3/UL
IMM GRANULOCYTES NFR BLD AUTO: 0 %
INR PPP: 1.25 (ref 0.9–1.2)
LYMPHOCYTES # BLD AUTO: 1.82 X10(3)/MCL
LYMPHOCYTES NFR BLD AUTO: 26 % (ref 13–40)
MCH RBC QN AUTO: 26.3 PG (ref 26–34)
MCHC RBC AUTO-ENTMCNC: 31.4 GM/DL (ref 31–37)
MCV RBC AUTO: 83.6 FL (ref 80–100)
MONOCYTES # BLD AUTO: 0.9 X10(3)/MCL
MONOCYTES NFR BLD AUTO: 13 % (ref 4–12)
NEUTROPHILS # BLD AUTO: 4.3 X10(3)/MCL
NEUTROPHILS NFR BLD AUTO: 60 X10(3)/MCL
PLATELET # BLD AUTO: 316 X10(3)/MCL (ref 130–400)
PMV BLD AUTO: 9 FL (ref 7.4–10.4)
POC TROPONIN: 0.02 NG/ML (ref 0–0.08)
POTASSIUM SERPL-SCNC: 3 MMOL/L (ref 3.5–5.1)
PROT SERPL-MCNC: 8.2 GM/DL (ref 6.4–8.2)
PROTHROMBIN TIME: 14.9 SECOND(S) (ref 11.9–14.4)
RBC # BLD AUTO: 3.54 X10(6)/MCL (ref 4.5–5.9)
SODIUM SERPL-SCNC: 140 MMOL/L (ref 136–145)
TROPONIN I SERPL-MCNC: 0.04 NG/ML (ref 0–0.05)
VIT B12 SERPL-MCNC: 596 PG/ML (ref 193–986)
WBC # SPEC AUTO: 7.1 X10(3)/MCL (ref 4.5–11)

## 2018-09-01 LAB
ABS NEUT (OLG): 3.51 X10(3)/MCL
ALBUMIN SERPL-MCNC: 3 GM/DL (ref 3.4–5)
ALBUMIN/GLOB SERPL: 1 RATIO (ref 1–2)
ALP SERPL-CCNC: 77 UNIT/L (ref 45–117)
ALT SERPL-CCNC: 28 UNIT/L (ref 12–78)
AST SERPL-CCNC: 20 UNIT/L (ref 15–37)
BASOPHILS NFR BLD MANUAL: 0 %
BILIRUB SERPL-MCNC: 0.7 MG/DL (ref 0.2–1)
BILIRUBIN DIRECT+TOT PNL SERPL-MCNC: 0.3 MG/DL
BILIRUBIN DIRECT+TOT PNL SERPL-MCNC: 0.4 MG/DL
BUN SERPL-MCNC: 23 MG/DL (ref 7–18)
CALCIUM SERPL-MCNC: 8 MG/DL (ref 8.5–10.1)
CHLORIDE SERPL-SCNC: 104 MMOL/L (ref 98–107)
CO2 SERPL-SCNC: 30 MMOL/L (ref 21–32)
CREAT SERPL-MCNC: 1.3 MG/DL (ref 0.6–1.3)
EOSINOPHIL NFR BLD MANUAL: 0 %
ERYTHROCYTE [DISTWIDTH] IN BLOOD BY AUTOMATED COUNT: 19.3 % (ref 11.5–14.5)
FERRITIN SERPL-MCNC: 43.9 NG/ML (ref 26–388)
GLOBULIN SER-MCNC: 4.4 GM/ML (ref 2.3–3.5)
GLUCOSE SERPL-MCNC: 81 MG/DL (ref 74–106)
GRANULOCYTES NFR BLD MANUAL: 59 % (ref 43–75)
HCT VFR BLD AUTO: 28.9 % (ref 40–51)
HGB BLD-MCNC: 9.1 GM/DL (ref 13.5–17.5)
INR PPP: 1.15 (ref 0.9–1.2)
IRON SATN MFR SERPL: 11.6 % (ref 15–50)
IRON SERPL-MCNC: 46 MCG/DL (ref 65–175)
LYMPHOCYTES NFR BLD MANUAL: 29 % (ref 20.5–51.1)
MCH RBC QN AUTO: 26.8 PG (ref 26–34)
MCHC RBC AUTO-ENTMCNC: 31.5 GM/DL (ref 31–37)
MCV RBC AUTO: 85 FL (ref 80–100)
MONOCYTES NFR BLD MANUAL: 12 % (ref 2–9)
PLATELET # BLD AUTO: 334 X10(3)/MCL (ref 130–400)
PLATELET # BLD EST: NORMAL 10*3/UL
PMV BLD AUTO: 9.6 FL (ref 7.4–10.4)
POLYCHROMASIA BLD QL SMEAR: ABNORMAL
POTASSIUM SERPL-SCNC: 3.6 MMOL/L (ref 3.5–5.1)
PROT SERPL-MCNC: 7.4 GM/DL (ref 6.4–8.2)
PROTHROMBIN TIME: 14 SECOND(S) (ref 11.9–14.4)
RBC # BLD AUTO: 3.4 X10(6)/MCL (ref 4.5–5.9)
RBC MORPH BLD: ABNORMAL
SODIUM SERPL-SCNC: 141 MMOL/L (ref 136–145)
TIBC SERPL-MCNC: 395 MCG/DL (ref 250–450)
TRANSFERRIN SERPL-MCNC: 298 MG/DL (ref 200–360)
WBC # SPEC AUTO: 6.5 X10(3)/MCL (ref 4.5–11)

## 2018-09-02 LAB
ABS NEUT (OLG): 3.47 X10(3)/MCL (ref 2.1–9.2)
BASOPHILS # BLD AUTO: 0.03 X10(3)/MCL
BASOPHILS NFR BLD AUTO: 0 %
BUN SERPL-MCNC: 19 MG/DL (ref 7–18)
CALCIUM SERPL-MCNC: 8.8 MG/DL (ref 8.5–10.1)
CHLORIDE SERPL-SCNC: 103 MMOL/L (ref 98–107)
CO2 SERPL-SCNC: 30 MMOL/L (ref 21–32)
CREAT SERPL-MCNC: 1.1 MG/DL (ref 0.6–1.3)
CREAT/UREA NIT SERPL: 17
EOSINOPHIL # BLD AUTO: 0.14 X10(3)/MCL
EOSINOPHIL NFR BLD AUTO: 2 %
ERYTHROCYTE [DISTWIDTH] IN BLOOD BY AUTOMATED COUNT: 19.8 % (ref 11.5–14.5)
GLUCOSE SERPL-MCNC: 63 MG/DL (ref 74–106)
HCT VFR BLD AUTO: 30.6 % (ref 40–51)
HGB BLD-MCNC: 9.4 GM/DL (ref 13.5–17.5)
IMM GRANULOCYTES # BLD AUTO: 0.03 10*3/UL
IMM GRANULOCYTES NFR BLD AUTO: 0 %
LYMPHOCYTES # BLD AUTO: 1.54 X10(3)/MCL
LYMPHOCYTES NFR BLD AUTO: 25 % (ref 13–40)
MAGNESIUM SERPL-MCNC: 1.8 MG/DL (ref 1.8–2.4)
MCH RBC QN AUTO: 26.6 PG (ref 26–34)
MCHC RBC AUTO-ENTMCNC: 30.7 GM/DL (ref 31–37)
MCV RBC AUTO: 86.7 FL (ref 80–100)
MONOCYTES # BLD AUTO: 0.85 X10(3)/MCL
MONOCYTES NFR BLD AUTO: 14 % (ref 4–12)
NEUTROPHILS # BLD AUTO: 3.47 X10(3)/MCL
NEUTROPHILS NFR BLD AUTO: 57 X10(3)/MCL
PLATELET # BLD AUTO: 331 X10(3)/MCL (ref 130–400)
PMV BLD AUTO: 9.4 FL (ref 7.4–10.4)
POTASSIUM SERPL-SCNC: 3.9 MMOL/L (ref 3.5–5.1)
RBC # BLD AUTO: 3.53 X10(6)/MCL (ref 4.5–5.9)
SODIUM SERPL-SCNC: 140 MMOL/L (ref 136–145)
WBC # SPEC AUTO: 6.1 X10(3)/MCL (ref 4.5–11)

## 2018-09-03 LAB
ABS NEUT (OLG): 3.03 X10(3)/MCL (ref 2.1–9.2)
BASOPHILS # BLD AUTO: 0.03 X10(3)/MCL
BASOPHILS NFR BLD AUTO: 0 %
BUN SERPL-MCNC: 20 MG/DL (ref 7–18)
CALCIUM SERPL-MCNC: 8.7 MG/DL (ref 8.5–10.1)
CHLORIDE SERPL-SCNC: 104 MMOL/L (ref 98–107)
CO2 SERPL-SCNC: 30 MMOL/L (ref 21–32)
CREAT SERPL-MCNC: 1.1 MG/DL (ref 0.6–1.3)
CREAT/UREA NIT SERPL: 18
EOSINOPHIL # BLD AUTO: 0.1 X10(3)/MCL
EOSINOPHIL NFR BLD AUTO: 2 %
ERYTHROCYTE [DISTWIDTH] IN BLOOD BY AUTOMATED COUNT: 19.7 % (ref 11.5–14.5)
GLUCOSE SERPL-MCNC: 86 MG/DL (ref 74–106)
HCT VFR BLD AUTO: 28.1 % (ref 40–51)
HGB BLD-MCNC: 8.6 GM/DL (ref 13.5–17.5)
IMM GRANULOCYTES # BLD AUTO: 0.03 10*3/UL
IMM GRANULOCYTES NFR BLD AUTO: 0 %
LYMPHOCYTES # BLD AUTO: 1.53 X10(3)/MCL
LYMPHOCYTES NFR BLD AUTO: 27 % (ref 13–40)
MAGNESIUM SERPL-MCNC: 2 MG/DL (ref 1.8–2.4)
MCH RBC QN AUTO: 26.5 PG (ref 26–34)
MCHC RBC AUTO-ENTMCNC: 30.6 GM/DL (ref 31–37)
MCV RBC AUTO: 86.5 FL (ref 80–100)
MONOCYTES # BLD AUTO: 0.95 X10(3)/MCL
MONOCYTES NFR BLD AUTO: 17 % (ref 4–12)
NEUTROPHILS # BLD AUTO: 3.03 X10(3)/MCL
NEUTROPHILS NFR BLD AUTO: 53 X10(3)/MCL
PLATELET # BLD AUTO: 314 X10(3)/MCL (ref 130–400)
PMV BLD AUTO: 9.6 FL (ref 7.4–10.4)
POTASSIUM SERPL-SCNC: 4.5 MMOL/L (ref 3.5–5.1)
RBC # BLD AUTO: 3.25 X10(6)/MCL (ref 4.5–5.9)
SODIUM SERPL-SCNC: 139 MMOL/L (ref 136–145)
WBC # SPEC AUTO: 5.7 X10(3)/MCL (ref 4.5–11)

## 2018-09-04 LAB
ABS NEUT (OLG): 3.77 X10(3)/MCL (ref 2.1–9.2)
BASOPHILS # BLD AUTO: 0.02 X10(3)/MCL
BASOPHILS NFR BLD AUTO: 0 %
BUN SERPL-MCNC: 17 MG/DL (ref 7–18)
CALCIUM SERPL-MCNC: 8.8 MG/DL (ref 8.5–10.1)
CHLORIDE SERPL-SCNC: 103 MMOL/L (ref 98–107)
CO2 SERPL-SCNC: 28 MMOL/L (ref 21–32)
CREAT SERPL-MCNC: 1.1 MG/DL (ref 0.6–1.3)
CREAT/UREA NIT SERPL: 15
EOSINOPHIL # BLD AUTO: 0.09 10*3/UL
EOSINOPHIL NFR BLD AUTO: 1 %
ERYTHROCYTE [DISTWIDTH] IN BLOOD BY AUTOMATED COUNT: 20.1 % (ref 11.5–14.5)
GLUCOSE SERPL-MCNC: 91 MG/DL (ref 74–106)
HCT VFR BLD AUTO: 29.5 % (ref 40–51)
HGB BLD-MCNC: 9.1 GM/DL (ref 13.5–17.5)
IMM GRANULOCYTES # BLD AUTO: 0.07 10*3/UL
IMM GRANULOCYTES NFR BLD AUTO: 1 %
LYMPHOCYTES # BLD AUTO: 1.8 X10(3)/MCL
LYMPHOCYTES NFR BLD AUTO: 27 % (ref 13–40)
MAGNESIUM SERPL-MCNC: 2.2 MG/DL (ref 1.8–2.4)
MCH RBC QN AUTO: 26.9 PG (ref 26–34)
MCHC RBC AUTO-ENTMCNC: 30.8 GM/DL (ref 31–37)
MCV RBC AUTO: 87.3 FL (ref 80–100)
MONOCYTES # BLD AUTO: 0.87 X10(3)/MCL
MONOCYTES NFR BLD AUTO: 13 % (ref 4–12)
NEUTROPHILS # BLD AUTO: 3.77 X10(3)/MCL
NEUTROPHILS NFR BLD AUTO: 57 X10(3)/MCL
PLATELET # BLD AUTO: 350 X10(3)/MCL (ref 130–400)
PMV BLD AUTO: 9.4 FL (ref 7.4–10.4)
POTASSIUM SERPL-SCNC: 4.6 MMOL/L (ref 3.5–5.1)
RBC # BLD AUTO: 3.38 X10(6)/MCL (ref 4.5–5.9)
SODIUM SERPL-SCNC: 137 MMOL/L (ref 136–145)
WBC # SPEC AUTO: 6.6 X10(3)/MCL (ref 4.5–11)

## 2018-09-05 LAB
ABS NEUT (OLG): 3.71 X10(3)/MCL
ANISOCYTOSIS BLD QL SMEAR: ABNORMAL
BASOPHILS NFR BLD MANUAL: 1 %
BUN SERPL-MCNC: 19 MG/DL (ref 7–18)
CALCIUM SERPL-MCNC: 8.8 MG/DL (ref 8.5–10.1)
CHLORIDE SERPL-SCNC: 101 MMOL/L (ref 98–107)
CO2 SERPL-SCNC: 29 MMOL/L (ref 21–32)
CREAT SERPL-MCNC: 1.2 MG/DL (ref 0.6–1.3)
CREAT/UREA NIT SERPL: 16
EOSINOPHIL NFR BLD MANUAL: 4 %
ERYTHROCYTE [DISTWIDTH] IN BLOOD BY AUTOMATED COUNT: 20.5 % (ref 11.5–14.5)
GLUCOSE SERPL-MCNC: 80 MG/DL (ref 74–106)
GRANULOCYTES NFR BLD MANUAL: 63 % (ref 43–75)
HCT VFR BLD AUTO: 29.1 % (ref 40–51)
HGB BLD-MCNC: 9.1 GM/DL (ref 13.5–17.5)
HYPOCHROMIA BLD QL SMEAR: ABNORMAL
LYMPHOCYTES NFR BLD MANUAL: 20 % (ref 20.5–51.1)
MAGNESIUM SERPL-MCNC: 2.2 MG/DL (ref 1.8–2.4)
MCH RBC QN AUTO: 27 PG (ref 26–34)
MCHC RBC AUTO-ENTMCNC: 31.3 GM/DL (ref 31–37)
MCV RBC AUTO: 86.4 FL (ref 80–100)
MONOCYTES NFR BLD MANUAL: 8 % (ref 2–9)
NEUTS BAND NFR BLD MANUAL: 4 % (ref 0–10)
NRBC BLD MANUAL-RTO: 1 %
PLATELET # BLD AUTO: 342 X10(3)/MCL (ref 130–400)
PLATELET # BLD EST: NORMAL 10*3/UL
PMV BLD AUTO: 9.5 FL (ref 7.4–10.4)
POLYCHROMASIA BLD QL SMEAR: ABNORMAL
POTASSIUM SERPL-SCNC: 4.4 MMOL/L (ref 3.5–5.1)
RBC # BLD AUTO: 3.37 X10(6)/MCL (ref 4.5–5.9)
RBC MORPH BLD: ABNORMAL
SODIUM SERPL-SCNC: 137 MMOL/L (ref 136–145)
WBC # SPEC AUTO: 6.4 X10(3)/MCL (ref 4.5–11)

## 2018-09-30 ENCOUNTER — HISTORICAL (OUTPATIENT)
Dept: ADMINISTRATIVE | Facility: HOSPITAL | Age: 58
End: 2018-09-30

## 2018-09-30 LAB
ABS NEUT (OLG): 2.96 X10(3)/MCL (ref 2.1–9.2)
ALBUMIN SERPL-MCNC: 3.4 GM/DL (ref 3.4–5)
ALBUMIN/GLOB SERPL: 1 RATIO (ref 1–2)
ALP SERPL-CCNC: 75 UNIT/L (ref 45–117)
ALT SERPL-CCNC: 37 UNIT/L (ref 12–78)
ANISOCYTOSIS BLD QL SMEAR: ABNORMAL
APTT PPP: 32.8 SECOND(S) (ref 23.3–37)
AST SERPL-CCNC: 30 UNIT/L (ref 15–37)
BASOPHILS NFR BLD MANUAL: 1 %
BILIRUB SERPL-MCNC: 1.3 MG/DL (ref 0.2–1)
BILIRUBIN DIRECT+TOT PNL SERPL-MCNC: 0.4 MG/DL
BILIRUBIN DIRECT+TOT PNL SERPL-MCNC: 0.9 MG/DL
BUN SERPL-MCNC: 23 MG/DL (ref 7–18)
CALCIUM SERPL-MCNC: 8.5 MG/DL (ref 8.5–10.1)
CHLORIDE SERPL-SCNC: 104 MMOL/L (ref 98–107)
CK MB SERPL-MCNC: 2.1 NG/ML (ref 1–3.6)
CK SERPL-CCNC: 127 UNIT/L (ref 39–308)
CO2 SERPL-SCNC: 26 MMOL/L (ref 21–32)
CREAT SERPL-MCNC: 1.5 MG/DL (ref 0.6–1.3)
EOSINOPHIL NFR BLD MANUAL: 0 %
ERYTHROCYTE [DISTWIDTH] IN BLOOD BY AUTOMATED COUNT: 20.1 % (ref 11.5–14.5)
GLOBULIN SER-MCNC: 4.2 GM/ML (ref 2.3–3.5)
GLUCOSE SERPL-MCNC: 101 MG/DL (ref 74–106)
GRANULOCYTES NFR BLD MANUAL: 59 % (ref 43–75)
HCT VFR BLD AUTO: 28.7 % (ref 40–51)
HGB BLD-MCNC: 9.2 GM/DL (ref 13.5–17.5)
INR PPP: 1.38 (ref 0.9–1.2)
LYMPHOCYTES NFR BLD MANUAL: 2 %
LYMPHOCYTES NFR BLD MANUAL: 25 % (ref 20.5–51.1)
MCH RBC QN AUTO: 26.5 PG (ref 26–34)
MCHC RBC AUTO-ENTMCNC: 32.1 GM/DL (ref 31–37)
MCV RBC AUTO: 82.7 FL (ref 80–100)
MONOCYTES NFR BLD MANUAL: 13 % (ref 2–9)
NRBC BLD MANUAL-RTO: 1 %
PLATELET # BLD AUTO: 247 X10(3)/MCL (ref 130–400)
PLATELET # BLD EST: ADEQUATE 10*3/UL
PMV BLD AUTO: 9.2 FL (ref 7.4–10.4)
POC TROPONIN: 0.03 NG/ML (ref 0–0.08)
POLYCHROMASIA BLD QL SMEAR: ABNORMAL
POTASSIUM SERPL-SCNC: 3.9 MMOL/L (ref 3.5–5.1)
PROT SERPL-MCNC: 7.6 GM/DL (ref 6.4–8.2)
PROTHROMBIN TIME: 16.1 SECOND(S) (ref 11.9–14.4)
RBC # BLD AUTO: 3.47 X10(6)/MCL (ref 4.5–5.9)
RBC MORPH BLD: ABNORMAL
SODIUM SERPL-SCNC: 139 MMOL/L (ref 136–145)
WBC # SPEC AUTO: 5.6 X10(3)/MCL (ref 4.5–11)

## 2018-10-01 LAB
ABS NEUT (OLG): 3.05 X10(3)/MCL (ref 2.1–9.2)
ALBUMIN SERPL-MCNC: 3.5 GM/DL (ref 3.4–5)
ALBUMIN/GLOB SERPL: 1 RATIO (ref 1–2)
ALP SERPL-CCNC: 70 UNIT/L (ref 45–117)
ALT SERPL-CCNC: 40 UNIT/L (ref 12–78)
AMPHET UR QL SCN: NEGATIVE
APPEARANCE, UA: CLEAR
AST SERPL-CCNC: 33 UNIT/L (ref 15–37)
BACTERIA #/AREA URNS AUTO: ABNORMAL /[HPF]
BARBITURATE SCN PRESENT UR: NEGATIVE
BASOPHILS # BLD AUTO: 0.02 X10(3)/MCL
BASOPHILS NFR BLD AUTO: 0 %
BENZODIAZ UR QL SCN: NEGATIVE
BILIRUB SERPL-MCNC: 1.3 MG/DL (ref 0.2–1)
BILIRUB UR QL STRIP: NEGATIVE
BILIRUBIN DIRECT+TOT PNL SERPL-MCNC: 0.4 MG/DL
BILIRUBIN DIRECT+TOT PNL SERPL-MCNC: 0.9 MG/DL
BUN SERPL-MCNC: 23 MG/DL (ref 7–18)
CALCIUM SERPL-MCNC: 8.4 MG/DL (ref 8.5–10.1)
CANNABINOIDS UR QL SCN: NEGATIVE
CHLORIDE SERPL-SCNC: 103 MMOL/L (ref 98–107)
CO2 SERPL-SCNC: 28 MMOL/L (ref 21–32)
COCAINE UR QL SCN: NEGATIVE
COLOR UR: COLORLESS
CREAT SERPL-MCNC: 1.4 MG/DL (ref 0.6–1.3)
EOSINOPHIL # BLD AUTO: 0.05 X10(3)/MCL
EOSINOPHIL NFR BLD AUTO: 1 %
ERYTHROCYTE [DISTWIDTH] IN BLOOD BY AUTOMATED COUNT: 20 % (ref 11.5–14.5)
GLOBULIN SER-MCNC: 4.2 GM/ML (ref 2.3–3.5)
GLUCOSE (UA): NORMAL
GLUCOSE SERPL-MCNC: 112 MG/DL (ref 74–106)
HCT VFR BLD AUTO: 28.2 % (ref 40–51)
HGB BLD-MCNC: 8.9 GM/DL (ref 13.5–17.5)
HGB UR QL STRIP: NEGATIVE
HYALINE CASTS #/AREA URNS LPF: ABNORMAL /[LPF]
IMM GRANULOCYTES # BLD AUTO: 0.01 10*3/UL
IMM GRANULOCYTES NFR BLD AUTO: 0 %
KETONES UR QL STRIP: NEGATIVE
LEUKOCYTE ESTERASE UR QL STRIP: NEGATIVE
LYMPHOCYTES # BLD AUTO: 1.46 X10(3)/MCL
LYMPHOCYTES NFR BLD AUTO: 29 % (ref 13–40)
MAGNESIUM SERPL-MCNC: 2.2 MG/DL (ref 1.8–2.4)
MCH RBC QN AUTO: 26.2 PG (ref 26–34)
MCHC RBC AUTO-ENTMCNC: 31.6 GM/DL (ref 31–37)
MCV RBC AUTO: 82.9 FL (ref 80–100)
MONOCYTES # BLD AUTO: 0.43 X10(3)/MCL
MONOCYTES NFR BLD AUTO: 9 % (ref 4–12)
NEUTROPHILS # BLD AUTO: 3.05 X10(3)/MCL
NEUTROPHILS NFR BLD AUTO: 61 X10(3)/MCL
NITRITE UR QL STRIP: NEGATIVE
OPIATES UR QL SCN: NEGATIVE
PCP UR QL: NEGATIVE
PH UR STRIP.AUTO: 6 [PH] (ref 5–8)
PH UR STRIP: 6 [PH] (ref 4.5–8)
PHOSPHATE SERPL-MCNC: 3.9 MG/DL (ref 2.5–4.9)
PLATELET # BLD AUTO: 231 X10(3)/MCL (ref 130–400)
PMV BLD AUTO: 9.2 FL (ref 7.4–10.4)
POTASSIUM SERPL-SCNC: 3.4 MMOL/L (ref 3.5–5.1)
PROT SERPL-MCNC: 7.7 GM/DL (ref 6.4–8.2)
PROT UR QL STRIP: NEGATIVE
RBC # BLD AUTO: 3.4 X10(6)/MCL (ref 4.5–5.9)
RBC #/AREA URNS AUTO: ABNORMAL /[HPF]
SODIUM SERPL-SCNC: 138 MMOL/L (ref 136–145)
SP GR UR STRIP: 1 (ref 1–1.03)
SQUAMOUS #/AREA URNS LPF: ABNORMAL /[LPF]
TEMPERATURE, URINE (OHS): 25 DEGC (ref 20–25)
TROPONIN I SERPL-MCNC: 0.02 NG/ML (ref 0–0.05)
UROBILINOGEN UR STRIP-ACNC: NORMAL
WBC # SPEC AUTO: 5 X10(3)/MCL (ref 4.5–11)
WBC #/AREA URNS AUTO: ABNORMAL /HPF

## 2018-10-02 ENCOUNTER — HISTORICAL (OUTPATIENT)
Dept: ADMINISTRATIVE | Facility: HOSPITAL | Age: 58
End: 2018-10-02

## 2018-10-02 LAB
ABS NEUT (OLG): 3 X10(3)/MCL (ref 2.1–9.2)
ABS NEUT (OLG): 3.67 X10(3)/MCL (ref 2.1–9.2)
ALBUMIN SERPL-MCNC: 3.2 GM/DL (ref 3.4–5)
ALBUMIN SERPL-MCNC: 3.7 GM/DL (ref 3.4–5)
ALBUMIN/GLOB SERPL: 1 RATIO (ref 1–2)
ALBUMIN/GLOB SERPL: 1 RATIO (ref 1–2)
ALP SERPL-CCNC: 69 UNIT/L (ref 45–117)
ALP SERPL-CCNC: 77 UNIT/L (ref 45–117)
ALT SERPL-CCNC: 39 UNIT/L (ref 12–78)
ALT SERPL-CCNC: 42 UNIT/L (ref 12–78)
AMPHET UR QL SCN: NEGATIVE
APPEARANCE, UA: CLEAR
APTT PPP: 29.8 SECOND(S) (ref 23.3–37)
AST SERPL-CCNC: 27 UNIT/L (ref 15–37)
AST SERPL-CCNC: 27 UNIT/L (ref 15–37)
BACTERIA #/AREA URNS AUTO: ABNORMAL /[HPF]
BARBITURATE SCN PRESENT UR: NEGATIVE
BASOPHILS # BLD AUTO: 0.03 X10(3)/MCL
BASOPHILS # BLD AUTO: 0.04 X10(3)/MCL
BASOPHILS NFR BLD AUTO: 1 %
BASOPHILS NFR BLD AUTO: 1 %
BENZODIAZ UR QL SCN: NEGATIVE
BILIRUB SERPL-MCNC: 1 MG/DL (ref 0.2–1)
BILIRUB SERPL-MCNC: 1.2 MG/DL (ref 0.2–1)
BILIRUB UR QL STRIP: NEGATIVE
BILIRUBIN DIRECT+TOT PNL SERPL-MCNC: 0.3 MG/DL
BILIRUBIN DIRECT+TOT PNL SERPL-MCNC: 0.3 MG/DL
BILIRUBIN DIRECT+TOT PNL SERPL-MCNC: 0.7 MG/DL
BILIRUBIN DIRECT+TOT PNL SERPL-MCNC: 0.9 MG/DL
BUN SERPL-MCNC: 18 MG/DL (ref 7–18)
BUN SERPL-MCNC: 21 MG/DL (ref 7–18)
CALCIUM SERPL-MCNC: 8.6 MG/DL (ref 8.5–10.1)
CALCIUM SERPL-MCNC: 8.8 MG/DL (ref 8.5–10.1)
CANNABINOIDS UR QL SCN: NEGATIVE
CHLORIDE SERPL-SCNC: 103 MMOL/L (ref 98–107)
CHLORIDE SERPL-SCNC: 103 MMOL/L (ref 98–107)
CK MB SERPL-MCNC: 2 NG/ML (ref 1–3.6)
CK SERPL-CCNC: 99 UNIT/L (ref 39–308)
CO2 SERPL-SCNC: 29 MMOL/L (ref 21–32)
CO2 SERPL-SCNC: 30 MMOL/L (ref 21–32)
COCAINE UR QL SCN: NEGATIVE
COLOR UR: YELLOW
CREAT SERPL-MCNC: 1.3 MG/DL (ref 0.6–1.3)
CREAT SERPL-MCNC: 1.4 MG/DL (ref 0.6–1.3)
EOSINOPHIL # BLD AUTO: 0.08 X10(3)/MCL
EOSINOPHIL # BLD AUTO: 0.1 X10(3)/MCL
EOSINOPHIL NFR BLD AUTO: 1 %
EOSINOPHIL NFR BLD AUTO: 2 %
ERYTHROCYTE [DISTWIDTH] IN BLOOD BY AUTOMATED COUNT: 20.2 % (ref 11.5–14.5)
ERYTHROCYTE [DISTWIDTH] IN BLOOD BY AUTOMATED COUNT: 20.7 % (ref 11.5–14.5)
GLOBULIN SER-MCNC: 4 GM/ML (ref 2.3–3.5)
GLOBULIN SER-MCNC: 4.5 GM/ML (ref 2.3–3.5)
GLUCOSE (UA): NORMAL
GLUCOSE SERPL-MCNC: 85 MG/DL (ref 74–106)
GLUCOSE SERPL-MCNC: 86 MG/DL (ref 74–106)
HCT VFR BLD AUTO: 29.2 % (ref 40–51)
HCT VFR BLD AUTO: 33.2 % (ref 40–51)
HGB BLD-MCNC: 10.3 GM/DL (ref 13.5–17.5)
HGB BLD-MCNC: 9.3 GM/DL (ref 13.5–17.5)
HGB UR QL STRIP: NEGATIVE
HYALINE CASTS #/AREA URNS LPF: ABNORMAL /[LPF]
IMM GRANULOCYTES # BLD AUTO: 0.02 10*3/UL
IMM GRANULOCYTES # BLD AUTO: 0.04 10*3/UL
IMM GRANULOCYTES NFR BLD AUTO: 0 %
IMM GRANULOCYTES NFR BLD AUTO: 1 %
INR PPP: 1.32 (ref 0.9–1.2)
KETONES UR QL STRIP: ABNORMAL
LEUKOCYTE ESTERASE UR QL STRIP: 25 LEU/UL
LYMPHOCYTES # BLD AUTO: 1.35 X10(3)/MCL
LYMPHOCYTES # BLD AUTO: 1.42 X10(3)/MCL
LYMPHOCYTES NFR BLD AUTO: 24 % (ref 13–40)
LYMPHOCYTES NFR BLD AUTO: 26 % (ref 13–40)
MAGNESIUM SERPL-MCNC: 1.9 MG/DL (ref 1.8–2.4)
MCH RBC QN AUTO: 25.9 PG (ref 26–34)
MCH RBC QN AUTO: 26.3 PG (ref 26–34)
MCHC RBC AUTO-ENTMCNC: 31 GM/DL (ref 31–37)
MCHC RBC AUTO-ENTMCNC: 31.8 GM/DL (ref 31–37)
MCV RBC AUTO: 82.7 FL (ref 80–100)
MCV RBC AUTO: 83.4 FL (ref 80–100)
MONOCYTES # BLD AUTO: 0.68 X10(3)/MCL
MONOCYTES # BLD AUTO: 0.72 X10(3)/MCL
MONOCYTES NFR BLD AUTO: 12 % (ref 4–12)
MONOCYTES NFR BLD AUTO: 14 % (ref 4–12)
NEUTROPHILS # BLD AUTO: 3 X10(3)/MCL
NEUTROPHILS # BLD AUTO: 3.67 X10(3)/MCL
NEUTROPHILS NFR BLD AUTO: 57 X10(3)/MCL
NEUTROPHILS NFR BLD AUTO: 62 X10(3)/MCL
NITRITE UR QL STRIP: NEGATIVE
OPIATES UR QL SCN: NEGATIVE
PCP UR QL: NEGATIVE
PH UR STRIP.AUTO: 6 [PH] (ref 5–8)
PH UR STRIP: 6 [PH] (ref 4.5–8)
PHOSPHATE SERPL-MCNC: 4.4 MG/DL (ref 2.5–4.9)
PLATELET # BLD AUTO: 254 X10(3)/MCL (ref 130–400)
PLATELET # BLD AUTO: 279 X10(3)/MCL (ref 130–400)
PMV BLD AUTO: 9.6 FL (ref 7.4–10.4)
PMV BLD AUTO: 9.7 FL (ref 7.4–10.4)
POC TROPONIN: 0.02 NG/ML (ref 0–0.08)
POTASSIUM SERPL-SCNC: 3.5 MMOL/L (ref 3.5–5.1)
POTASSIUM SERPL-SCNC: 3.8 MMOL/L (ref 3.5–5.1)
PROT SERPL-MCNC: 7.2 GM/DL (ref 6.4–8.2)
PROT SERPL-MCNC: 8.2 GM/DL (ref 6.4–8.2)
PROT UR QL STRIP: 30 MG/DL
PROTHROMBIN TIME: 15.6 SECOND(S) (ref 11.9–14.4)
RBC # BLD AUTO: 3.53 X10(6)/MCL (ref 4.5–5.9)
RBC # BLD AUTO: 3.98 X10(6)/MCL (ref 4.5–5.9)
RBC #/AREA URNS AUTO: ABNORMAL /[HPF]
SODIUM SERPL-SCNC: 139 MMOL/L (ref 136–145)
SODIUM SERPL-SCNC: 139 MMOL/L (ref 136–145)
SP GR UR STRIP: 1.02 (ref 1–1.03)
SQUAMOUS #/AREA URNS LPF: ABNORMAL /[LPF]
TEMPERATURE, URINE (OHS): 21 DEGC (ref 20–25)
UROBILINOGEN UR STRIP-ACNC: 6 MG/DL
WBC # SPEC AUTO: 5.2 X10(3)/MCL (ref 4.5–11)
WBC # SPEC AUTO: 5.9 X10(3)/MCL (ref 4.5–11)
WBC #/AREA URNS AUTO: ABNORMAL /HPF

## 2018-10-03 LAB
ABS NEUT (OLG): 2.89 X10(3)/MCL
ALBUMIN SERPL-MCNC: 3.4 GM/DL (ref 3.4–5)
ALBUMIN/GLOB SERPL: 1 RATIO (ref 1–2)
ALP SERPL-CCNC: 77 UNIT/L (ref 45–117)
ALT SERPL-CCNC: 36 UNIT/L (ref 12–78)
ANISOCYTOSIS BLD QL SMEAR: NORMAL
AST SERPL-CCNC: 23 UNIT/L (ref 15–37)
BASOPHILS NFR BLD MANUAL: 0 %
BILIRUB SERPL-MCNC: 0.8 MG/DL (ref 0.2–1)
BILIRUBIN DIRECT+TOT PNL SERPL-MCNC: 0.3 MG/DL
BILIRUBIN DIRECT+TOT PNL SERPL-MCNC: 0.5 MG/DL
BUN SERPL-MCNC: 21 MG/DL (ref 7–18)
CALCIUM SERPL-MCNC: 8.8 MG/DL (ref 8.5–10.1)
CHLORIDE SERPL-SCNC: 102 MMOL/L (ref 98–107)
CO2 SERPL-SCNC: 29 MMOL/L (ref 21–32)
CREAT SERPL-MCNC: 1.3 MG/DL (ref 0.6–1.3)
EOSINOPHIL NFR BLD MANUAL: 1 %
ERYTHROCYTE [DISTWIDTH] IN BLOOD BY AUTOMATED COUNT: 20.5 % (ref 11.5–14.5)
GLOBULIN SER-MCNC: 4.2 GM/ML (ref 2.3–3.5)
GLUCOSE SERPL-MCNC: 89 MG/DL (ref 74–106)
GRANULOCYTES NFR BLD MANUAL: 60 % (ref 43–75)
HCT VFR BLD AUTO: 31.9 % (ref 40–51)
HGB BLD-MCNC: 10 GM/DL (ref 13.5–17.5)
HYPOCHROMIA BLD QL SMEAR: NORMAL
LYMPHOCYTES NFR BLD MANUAL: 35 % (ref 20.5–51.1)
MCH RBC QN AUTO: 26.3 PG (ref 26–34)
MCHC RBC AUTO-ENTMCNC: 31.3 GM/DL (ref 31–37)
MCV RBC AUTO: 83.9 FL (ref 80–100)
MONOCYTES NFR BLD MANUAL: 4 % (ref 2–9)
PLATELET # BLD AUTO: 280 X10(3)/MCL (ref 130–400)
PLATELET # BLD EST: NORMAL 10*3/UL
PMV BLD AUTO: 9.6 FL (ref 7.4–10.4)
POIKILOCYTOSIS BLD QL SMEAR: NORMAL
POLYCHROMASIA BLD QL SMEAR: NORMAL
POTASSIUM SERPL-SCNC: 3.9 MMOL/L (ref 3.5–5.1)
PROT SERPL-MCNC: 7.6 GM/DL (ref 6.4–8.2)
RBC # BLD AUTO: 3.8 X10(6)/MCL (ref 4.5–5.9)
RBC MORPH BLD: NORMAL
SODIUM SERPL-SCNC: 138 MMOL/L (ref 136–145)
WBC # SPEC AUTO: 5.6 X10(3)/MCL (ref 4.5–11)

## 2018-12-09 ENCOUNTER — HISTORICAL (OUTPATIENT)
Dept: ADMINISTRATIVE | Facility: HOSPITAL | Age: 58
End: 2018-12-09

## 2018-12-09 LAB
ABS NEUT (OLG): 2.92 X10(3)/MCL (ref 2.1–9.2)
ALBUMIN SERPL-MCNC: 3.7 GM/DL (ref 3.4–5)
ALBUMIN/GLOB SERPL: 0.8 {RATIO}
ALP SERPL-CCNC: 73 UNIT/L (ref 50–136)
ALT SERPL-CCNC: 25 UNIT/L (ref 12–78)
APPEARANCE, UA: CLEAR
AST SERPL-CCNC: 34 UNIT/L (ref 15–37)
BACTERIA SPEC CULT: NORMAL /HPF
BASOPHILS # BLD AUTO: 0 X10(3)/MCL (ref 0–0.2)
BASOPHILS NFR BLD AUTO: 1 %
BILIRUB SERPL-MCNC: 0.7 MG/DL (ref 0.2–1)
BILIRUB UR QL STRIP: NEGATIVE
BILIRUBIN DIRECT+TOT PNL SERPL-MCNC: 0.2 MG/DL (ref 0–0.2)
BILIRUBIN DIRECT+TOT PNL SERPL-MCNC: 0.5 MG/DL (ref 0–0.8)
BUN SERPL-MCNC: 13 MG/DL (ref 7–18)
CALCIUM SERPL-MCNC: 9 MG/DL (ref 8.5–10.1)
CHLORIDE SERPL-SCNC: 105 MMOL/L (ref 98–107)
CO2 SERPL-SCNC: 27 MMOL/L (ref 21–32)
COLOR UR: YELLOW
CREAT SERPL-MCNC: 1.51 MG/DL (ref 0.7–1.3)
EOSINOPHIL # BLD AUTO: 0 X10(3)/MCL (ref 0–0.9)
EOSINOPHIL NFR BLD AUTO: 1 %
ERYTHROCYTE [DISTWIDTH] IN BLOOD BY AUTOMATED COUNT: 20.9 % (ref 11.5–17)
GLOBULIN SER-MCNC: 4.5 GM/DL (ref 2.4–3.5)
GLUCOSE (UA): NEGATIVE
GLUCOSE SERPL-MCNC: 83 MG/DL (ref 74–106)
HCT VFR BLD AUTO: 40.5 % (ref 42–52)
HGB BLD-MCNC: 12.2 GM/DL (ref 14–18)
HGB UR QL STRIP: NEGATIVE
KETONES UR QL STRIP: NEGATIVE
LEUKOCYTE ESTERASE UR QL STRIP: NEGATIVE
LYMPHOCYTES # BLD AUTO: 1.3 X10(3)/MCL (ref 0.6–4.6)
LYMPHOCYTES NFR BLD AUTO: 27 %
MCH RBC QN AUTO: 25.5 PG (ref 27–31)
MCHC RBC AUTO-ENTMCNC: 30.1 GM/DL (ref 33–36)
MCV RBC AUTO: 84.6 FL (ref 80–94)
MONOCYTES # BLD AUTO: 0.4 X10(3)/MCL (ref 0.1–1.3)
MONOCYTES NFR BLD AUTO: 9 %
NEUTROPHILS # BLD AUTO: 2.92 X10(3)/MCL (ref 2.1–9.2)
NEUTROPHILS NFR BLD AUTO: 62 %
NITRITE UR QL STRIP: NEGATIVE
PH UR STRIP: 5 [PH] (ref 5–9)
PLATELET # BLD AUTO: 402 X10(3)/MCL (ref 130–400)
PMV BLD AUTO: 8.9 FL (ref 9.4–12.4)
POTASSIUM SERPL-SCNC: 4.1 MMOL/L (ref 3.5–5.1)
PROT SERPL-MCNC: 8.2 GM/DL (ref 6.4–8.2)
PROT UR QL STRIP: NEGATIVE
RBC # BLD AUTO: 4.79 X10(6)/MCL (ref 4.7–6.1)
RBC #/AREA URNS HPF: NORMAL /[HPF]
SODIUM SERPL-SCNC: 138 MMOL/L (ref 136–145)
SP GR UR STRIP: 1.02 (ref 1–1.03)
SQUAMOUS EPITHELIAL, UA: NORMAL
TROPONIN I SERPL-MCNC: 0.42 NG/ML (ref 0.02–0.49)
UROBILINOGEN UR STRIP-ACNC: 0.2
WBC # SPEC AUTO: 4.7 X10(3)/MCL (ref 4.5–11.5)
WBC #/AREA URNS HPF: NORMAL /HPF

## 2018-12-13 ENCOUNTER — HISTORICAL (OUTPATIENT)
Dept: ADMINISTRATIVE | Facility: HOSPITAL | Age: 58
End: 2018-12-13

## 2018-12-13 LAB
ABS NEUT (OLG): 3.24 X10(3)/MCL (ref 2.1–9.2)
ALBUMIN SERPL-MCNC: 3.4 GM/DL (ref 3.4–5)
ALBUMIN/GLOB SERPL: 0.8 {RATIO}
ALP SERPL-CCNC: 66 UNIT/L (ref 50–136)
ALT SERPL-CCNC: 25 UNIT/L (ref 12–78)
AST SERPL-CCNC: 26 UNIT/L (ref 15–37)
BASOPHILS # BLD AUTO: 0 X10(3)/MCL (ref 0–0.2)
BASOPHILS NFR BLD AUTO: 1 %
BILIRUB SERPL-MCNC: 0.4 MG/DL (ref 0.2–1)
BILIRUBIN DIRECT+TOT PNL SERPL-MCNC: 0.2 MG/DL (ref 0–0.2)
BILIRUBIN DIRECT+TOT PNL SERPL-MCNC: 0.2 MG/DL (ref 0–0.8)
BNP BLD-MCNC: >5000 PG/ML (ref 0–100)
BUN SERPL-MCNC: 17 MG/DL (ref 7–18)
CALCIUM SERPL-MCNC: 8.4 MG/DL (ref 8.5–10.1)
CHLORIDE SERPL-SCNC: 103 MMOL/L (ref 98–107)
CK MB SERPL-MCNC: 3.1 NG/ML (ref 0.5–3.6)
CK SERPL-CCNC: 111 UNIT/L (ref 39–308)
CO2 SERPL-SCNC: 27 MMOL/L (ref 21–32)
CREAT SERPL-MCNC: 1.41 MG/DL (ref 0.7–1.3)
EOSINOPHIL # BLD AUTO: 0.1 X10(3)/MCL (ref 0–0.9)
EOSINOPHIL NFR BLD AUTO: 1 %
ERYTHROCYTE [DISTWIDTH] IN BLOOD BY AUTOMATED COUNT: 20.5 % (ref 11.5–17)
GLOBULIN SER-MCNC: 4 GM/DL (ref 2.4–3.5)
GLUCOSE SERPL-MCNC: 84 MG/DL (ref 74–106)
HCT VFR BLD AUTO: 34 % (ref 42–52)
HGB BLD-MCNC: 10.3 GM/DL (ref 14–18)
LYMPHOCYTES # BLD AUTO: 1.6 X10(3)/MCL (ref 0.6–4.6)
LYMPHOCYTES NFR BLD AUTO: 29 %
MCH RBC QN AUTO: 25.7 PG (ref 27–31)
MCHC RBC AUTO-ENTMCNC: 30.3 GM/DL (ref 33–36)
MCV RBC AUTO: 84.8 FL (ref 80–94)
MONOCYTES # BLD AUTO: 0.4 X10(3)/MCL (ref 0.1–1.3)
MONOCYTES NFR BLD AUTO: 8 %
NEUTROPHILS # BLD AUTO: 3.24 X10(3)/MCL (ref 2.1–9.2)
NEUTROPHILS NFR BLD AUTO: 61 %
PLATELET # BLD AUTO: 306 X10(3)/MCL (ref 130–400)
PMV BLD AUTO: 9.7 FL (ref 9.4–12.4)
POC TROPONIN: 0.1 NG/ML (ref 0–0.08)
POTASSIUM SERPL-SCNC: 4.2 MMOL/L (ref 3.5–5.1)
PROT SERPL-MCNC: 7.4 GM/DL (ref 6.4–8.2)
RBC # BLD AUTO: 4.01 X10(6)/MCL (ref 4.7–6.1)
SODIUM SERPL-SCNC: 138 MMOL/L (ref 136–145)
TROPONIN I SERPL-MCNC: 0.13 NG/ML (ref 0.02–0.49)
TROPONIN I SERPL-MCNC: 0.13 NG/ML (ref 0.02–0.49)
WBC # SPEC AUTO: 5.3 X10(3)/MCL (ref 4.5–11.5)

## 2018-12-14 LAB
ABS NEUT (OLG): 4.11 X10(3)/MCL (ref 2.1–9.2)
BASOPHILS # BLD AUTO: 0 X10(3)/MCL (ref 0–0.2)
BASOPHILS NFR BLD AUTO: 1 %
BUN SERPL-MCNC: 16 MG/DL (ref 7–18)
CALCIUM SERPL-MCNC: 8.3 MG/DL (ref 8.5–10.1)
CHLORIDE SERPL-SCNC: 107 MMOL/L (ref 98–107)
CK MB SERPL-MCNC: 3.2 NG/ML (ref 0.5–3.6)
CK MB SERPL-MCNC: 3.2 NG/ML (ref 0.5–3.6)
CK SERPL-CCNC: 100 UNIT/L (ref 39–308)
CK SERPL-CCNC: 93 UNIT/L (ref 39–308)
CO2 SERPL-SCNC: 26 MMOL/L (ref 21–32)
CREAT SERPL-MCNC: 1.26 MG/DL (ref 0.7–1.3)
CREAT/UREA NIT SERPL: 12.7
EOSINOPHIL # BLD AUTO: 0.1 X10(3)/MCL (ref 0–0.9)
EOSINOPHIL NFR BLD AUTO: 1 %
ERYTHROCYTE [DISTWIDTH] IN BLOOD BY AUTOMATED COUNT: 20.3 % (ref 11.5–17)
GLUCOSE SERPL-MCNC: 94 MG/DL (ref 74–106)
HCT VFR BLD AUTO: 32.1 % (ref 42–52)
HGB BLD-MCNC: 9.7 GM/DL (ref 14–18)
LYMPHOCYTES # BLD AUTO: 1.5 X10(3)/MCL (ref 0.6–4.6)
LYMPHOCYTES NFR BLD AUTO: 24 %
MCH RBC QN AUTO: 25.1 PG (ref 27–31)
MCHC RBC AUTO-ENTMCNC: 30.2 GM/DL (ref 33–36)
MCV RBC AUTO: 82.9 FL (ref 80–94)
MONOCYTES # BLD AUTO: 0.5 X10(3)/MCL (ref 0.1–1.3)
MONOCYTES NFR BLD AUTO: 8 %
NEUTROPHILS # BLD AUTO: 4.11 X10(3)/MCL (ref 2.1–9.2)
NEUTROPHILS NFR BLD AUTO: 66 %
PLATELET # BLD AUTO: 267 X10(3)/MCL (ref 130–400)
PMV BLD AUTO: 9.7 FL (ref 9.4–12.4)
POTASSIUM SERPL-SCNC: 3.8 MMOL/L (ref 3.5–5.1)
RBC # BLD AUTO: 3.87 X10(6)/MCL (ref 4.7–6.1)
SODIUM SERPL-SCNC: 143 MMOL/L (ref 136–145)
TROPONIN I SERPL-MCNC: 0.09 NG/ML (ref 0.02–0.49)
TROPONIN I SERPL-MCNC: 0.1 NG/ML (ref 0.02–0.49)
WBC # SPEC AUTO: 6.3 X10(3)/MCL (ref 4.5–11.5)

## 2018-12-17 LAB
AMPHET UR QL SCN: NORMAL
BARBITURATE SCN PRESENT UR: NORMAL
BENZODIAZ UR QL SCN: NORMAL
CANNABINOIDS UR QL SCN: NORMAL
COCAINE UR QL SCN: NORMAL
OPIATES UR QL SCN: NORMAL
PCP UR QL: NORMAL
PH UR STRIP.AUTO: 5.5 [PH] (ref 5–7.5)
SP GR FLD REFRACTOMETRY: 1.02 (ref 1–1.03)

## 2018-12-18 PROBLEM — I10 ESSENTIAL HYPERTENSION: Status: ACTIVE | Noted: 2018-12-18

## 2018-12-18 PROBLEM — Z95.0 PACEMAKER: Status: ACTIVE | Noted: 2018-12-18

## 2018-12-18 PROBLEM — I48.0 PAROXYSMAL ATRIAL FIBRILLATION: Status: ACTIVE | Noted: 2018-12-18

## 2018-12-18 PROBLEM — I25.5 ISCHEMIC CARDIOMYOPATHY: Status: ACTIVE | Noted: 2018-12-18

## 2018-12-18 PROBLEM — R07.9 CHEST PAIN: Status: ACTIVE | Noted: 2018-12-18

## 2018-12-18 PROBLEM — R79.89 ELEVATED TROPONIN: Status: ACTIVE | Noted: 2018-12-18

## 2018-12-18 PROBLEM — Z86.59 H/O PSYCHIATRIC HOSPITALIZATION: Status: ACTIVE | Noted: 2018-12-18

## 2018-12-18 LAB
ABS NEUT (OLG): 3.62 X10(3)/MCL (ref 2.1–9.2)
BASOPHILS # BLD AUTO: 0 X10(3)/MCL (ref 0–0.2)
BASOPHILS NFR BLD AUTO: 1 %
EOSINOPHIL # BLD AUTO: 0.2 X10(3)/MCL (ref 0–0.9)
EOSINOPHIL NFR BLD AUTO: 2 %
ERYTHROCYTE [DISTWIDTH] IN BLOOD BY AUTOMATED COUNT: 21 % (ref 11.5–17)
HCT VFR BLD AUTO: 36.6 % (ref 42–52)
HGB BLD-MCNC: 10.8 GM/DL (ref 14–18)
LYMPHOCYTES # BLD AUTO: 2.1 X10(3)/MCL (ref 0.6–4.6)
LYMPHOCYTES NFR BLD AUTO: 32 %
MCH RBC QN AUTO: 25.7 PG (ref 27–31)
MCHC RBC AUTO-ENTMCNC: 29.5 GM/DL (ref 33–36)
MCV RBC AUTO: 86.9 FL (ref 80–94)
MONOCYTES # BLD AUTO: 0.7 X10(3)/MCL (ref 0.1–1.3)
MONOCYTES NFR BLD AUTO: 10 %
NEUTROPHILS # BLD AUTO: 3.62 X10(3)/MCL (ref 2.1–9.2)
NEUTROPHILS NFR BLD AUTO: 55 %
PLATELET # BLD AUTO: 273 X10(3)/MCL (ref 130–400)
PMV BLD AUTO: 9.7 FL (ref 9.4–12.4)
RBC # BLD AUTO: 4.21 X10(6)/MCL (ref 4.7–6.1)
WBC # SPEC AUTO: 6.6 X10(3)/MCL (ref 4.5–11.5)

## 2018-12-20 PROBLEM — I20.89 CHRONIC STABLE ANGINA: Status: ACTIVE | Noted: 2018-12-20

## 2018-12-26 ENCOUNTER — HISTORICAL (OUTPATIENT)
Dept: ADMINISTRATIVE | Facility: HOSPITAL | Age: 58
End: 2018-12-26

## 2018-12-26 LAB
ABS NEUT (OLG): 6.4 X10(3)/MCL (ref 2.1–9.2)
ALBUMIN SERPL-MCNC: 4.1 GM/DL (ref 3.4–5)
ALBUMIN/GLOB SERPL: 0.8 RATIO (ref 1.1–2)
ALP SERPL-CCNC: 89 UNIT/L (ref 50–136)
ALT SERPL-CCNC: 35 UNIT/L (ref 12–78)
APPEARANCE, UA: CLEAR
AST SERPL-CCNC: 38 UNIT/L (ref 15–37)
BACTERIA SPEC CULT: NORMAL /HPF
BASOPHILS # BLD AUTO: 0 X10(3)/MCL (ref 0–0.2)
BASOPHILS NFR BLD AUTO: 0 %
BILIRUB SERPL-MCNC: 0.5 MG/DL (ref 0.2–1)
BILIRUB UR QL STRIP: NEGATIVE
BILIRUBIN DIRECT+TOT PNL SERPL-MCNC: 0.2 MG/DL (ref 0–0.5)
BILIRUBIN DIRECT+TOT PNL SERPL-MCNC: 0.3 MG/DL (ref 0–0.8)
BUN SERPL-MCNC: 37 MG/DL (ref 7–18)
CALCIUM SERPL-MCNC: 9.1 MG/DL (ref 8.5–10.1)
CHLORIDE SERPL-SCNC: 97 MMOL/L (ref 98–107)
CO2 SERPL-SCNC: 27 MMOL/L (ref 21–32)
COLOR UR: YELLOW
CREAT SERPL-MCNC: 1.97 MG/DL (ref 0.7–1.3)
EOSINOPHIL # BLD AUTO: 0.1 X10(3)/MCL (ref 0–0.9)
EOSINOPHIL NFR BLD AUTO: 1 %
ERYTHROCYTE [DISTWIDTH] IN BLOOD BY AUTOMATED COUNT: 21 % (ref 11.5–17)
GLOBULIN SER-MCNC: 5.2 GM/DL (ref 2.4–3.5)
GLUCOSE (UA): NEGATIVE
GLUCOSE SERPL-MCNC: 88 MG/DL (ref 74–106)
HCT VFR BLD AUTO: 36.7 % (ref 42–52)
HGB BLD-MCNC: 11.4 GM/DL (ref 14–18)
HGB UR QL STRIP: NEGATIVE
KETONES UR QL STRIP: NEGATIVE
LEUKOCYTE ESTERASE UR QL STRIP: NEGATIVE
LYMPHOCYTES # BLD AUTO: 1.2 X10(3)/MCL (ref 0.6–4.6)
LYMPHOCYTES NFR BLD AUTO: 14 %
MCH RBC QN AUTO: 25.8 PG (ref 27–31)
MCHC RBC AUTO-ENTMCNC: 31.1 GM/DL (ref 33–36)
MCV RBC AUTO: 83 FL (ref 80–94)
MONOCYTES # BLD AUTO: 0.7 X10(3)/MCL (ref 0.1–1.3)
MONOCYTES NFR BLD AUTO: 8 %
NEUTROPHILS # BLD AUTO: 6.4 X10(3)/MCL (ref 2.1–9.2)
NEUTROPHILS NFR BLD AUTO: 76 %
NITRITE UR QL STRIP: NEGATIVE
PH UR STRIP: 6 [PH] (ref 5–9)
PLATELET # BLD AUTO: 413 X10(3)/MCL (ref 130–400)
PMV BLD AUTO: 8.9 FL (ref 9.4–12.4)
POTASSIUM SERPL-SCNC: 4.2 MMOL/L (ref 3.5–5.1)
PROT SERPL-MCNC: 9.3 GM/DL (ref 6.4–8.2)
PROT UR QL STRIP: NEGATIVE
RBC # BLD AUTO: 4.42 X10(6)/MCL (ref 4.7–6.1)
RBC #/AREA URNS HPF: NORMAL /[HPF]
SODIUM SERPL-SCNC: 134 MMOL/L (ref 136–145)
SP GR UR STRIP: 1.02 (ref 1–1.03)
SQUAMOUS EPITHELIAL, UA: NORMAL
TROPONIN I SERPL-MCNC: 0.04 NG/ML (ref 0.02–0.49)
UROBILINOGEN UR STRIP-ACNC: 1
WBC # SPEC AUTO: 8.4 X10(3)/MCL (ref 4.5–11.5)
WBC #/AREA URNS HPF: NORMAL /HPF

## 2018-12-27 ENCOUNTER — HISTORICAL (OUTPATIENT)
Dept: ADMINISTRATIVE | Facility: HOSPITAL | Age: 58
End: 2018-12-27

## 2018-12-27 LAB
ALBUMIN SERPL-MCNC: 3.6 GM/DL (ref 3.4–5)
ALBUMIN/GLOB SERPL: 0.9 RATIO (ref 1.1–2)
ALP SERPL-CCNC: 77 UNIT/L (ref 50–136)
ALT SERPL-CCNC: 28 UNIT/L (ref 12–78)
AMPHET UR QL SCN: ABNORMAL
AST SERPL-CCNC: 31 UNIT/L (ref 15–37)
BARBITURATE SCN PRESENT UR: ABNORMAL
BENZODIAZ UR QL SCN: POSITIVE
BILIRUB SERPL-MCNC: 0.6 MG/DL (ref 0.2–1)
BILIRUBIN DIRECT+TOT PNL SERPL-MCNC: 0.2 MG/DL (ref 0–0.5)
BILIRUBIN DIRECT+TOT PNL SERPL-MCNC: 0.4 MG/DL (ref 0–0.8)
BUN SERPL-MCNC: 34 MG/DL (ref 7–18)
CALCIUM SERPL-MCNC: 8.7 MG/DL (ref 8.5–10.1)
CANNABINOIDS UR QL SCN: ABNORMAL
CHLORIDE SERPL-SCNC: 101 MMOL/L (ref 98–107)
CO2 SERPL-SCNC: 25 MMOL/L (ref 21–32)
COCAINE UR QL SCN: POSITIVE
CREAT SERPL-MCNC: 1.67 MG/DL (ref 0.7–1.3)
ETHANOL SERPL-MCNC: <3 MG/DL (ref 0–3)
GLOBULIN SER-MCNC: 3.9 GM/DL (ref 2.4–3.5)
GLUCOSE SERPL-MCNC: 71 MG/DL (ref 74–106)
OPIATES UR QL SCN: ABNORMAL
PCP UR QL: ABNORMAL
PH UR STRIP.AUTO: 6 [PH] (ref 5–7.5)
POTASSIUM SERPL-SCNC: 4.2 MMOL/L (ref 3.5–5.1)
PROT SERPL-MCNC: 7.5 GM/DL (ref 6.4–8.2)
SODIUM SERPL-SCNC: 135 MMOL/L (ref 136–145)
SP GR FLD REFRACTOMETRY: 1.02 (ref 1–1.03)

## 2019-01-07 ENCOUNTER — HISTORICAL (OUTPATIENT)
Dept: ADMINISTRATIVE | Facility: HOSPITAL | Age: 59
End: 2019-01-07

## 2019-01-07 LAB
ABS NEUT (OLG): 3.15 X10(3)/MCL (ref 2.1–9.2)
ALBUMIN SERPL-MCNC: 3.9 GM/DL (ref 3.4–5)
ALBUMIN/GLOB SERPL: 1 RATIO (ref 1–2)
ALP SERPL-CCNC: 64 UNIT/L (ref 45–117)
ALT SERPL-CCNC: 29 UNIT/L (ref 12–78)
AMPHET UR QL SCN: NEGATIVE
APAP SERPL-MCNC: <2 MCG/ML (ref 10–30)
APPEARANCE, UA: CLEAR
AST SERPL-CCNC: 42 UNIT/L (ref 15–37)
BACTERIA #/AREA URNS AUTO: ABNORMAL /[HPF]
BARBITURATE SCN PRESENT UR: NEGATIVE
BASOPHILS # BLD AUTO: 0.04 X10(3)/MCL
BASOPHILS NFR BLD AUTO: 1 %
BENZODIAZ UR QL SCN: POSITIVE
BILIRUB SERPL-MCNC: 1.3 MG/DL (ref 0.2–1)
BILIRUB UR QL STRIP: NEGATIVE
BILIRUBIN DIRECT+TOT PNL SERPL-MCNC: 0.4 MG/DL
BILIRUBIN DIRECT+TOT PNL SERPL-MCNC: 0.9 MG/DL
BUN SERPL-MCNC: 21 MG/DL (ref 7–18)
CALCIUM SERPL-MCNC: 9 MG/DL (ref 8.5–10.1)
CANNABINOIDS UR QL SCN: POSITIVE
CHLORIDE SERPL-SCNC: 103 MMOL/L (ref 98–107)
CK MB SERPL-MCNC: 10.7 NG/ML (ref 1–3.6)
CK MB SERPL-MCNC: 13.3 NG/ML (ref 1–3.6)
CK SERPL-CCNC: 445 UNIT/L (ref 39–308)
CK SERPL-CCNC: 510 UNIT/L (ref 39–308)
CO2 SERPL-SCNC: 28 MMOL/L (ref 21–32)
COCAINE UR QL SCN: POSITIVE
COLOR UR: YELLOW
CREAT SERPL-MCNC: 1.3 MG/DL (ref 0.6–1.3)
EOSINOPHIL # BLD AUTO: 0.08 X10(3)/MCL
EOSINOPHIL NFR BLD AUTO: 2 %
ERYTHROCYTE [DISTWIDTH] IN BLOOD BY AUTOMATED COUNT: 19.8 % (ref 11.5–14.5)
ETHANOL SERPL-MCNC: <3 MG/DL
GLOBULIN SER-MCNC: 4.3 GM/ML (ref 2.3–3.5)
GLUCOSE (UA): NORMAL
GLUCOSE SERPL-MCNC: 72 MG/DL (ref 74–106)
HCT VFR BLD AUTO: 33.7 % (ref 40–51)
HGB BLD-MCNC: 10.5 GM/DL (ref 13.5–17.5)
HGB UR QL STRIP: NEGATIVE
HYALINE CASTS #/AREA URNS LPF: ABNORMAL /[LPF]
IMM GRANULOCYTES # BLD AUTO: 0.01 10*3/UL
IMM GRANULOCYTES NFR BLD AUTO: 0 %
KETONES UR QL STRIP: ABNORMAL
LEUKOCYTE ESTERASE UR QL STRIP: 25 LEU/UL
LYMPHOCYTES # BLD AUTO: 1.05 X10(3)/MCL
LYMPHOCYTES NFR BLD AUTO: 22 % (ref 13–40)
MAGNESIUM SERPL-MCNC: 2.1 MG/DL (ref 1.8–2.4)
MCH RBC QN AUTO: 25.4 PG (ref 26–34)
MCHC RBC AUTO-ENTMCNC: 31.2 GM/DL (ref 31–37)
MCV RBC AUTO: 81.6 FL (ref 80–100)
MONOCYTES # BLD AUTO: 0.55 X10(3)/MCL
MONOCYTES NFR BLD AUTO: 11 % (ref 4–12)
NEUTROPHILS # BLD AUTO: 3.15 X10(3)/MCL
NEUTROPHILS NFR BLD AUTO: 65 X10(3)/MCL
NITRITE UR QL STRIP: NEGATIVE
OPIATES UR QL SCN: NEGATIVE
PCP UR QL: NEGATIVE
PH UR STRIP.AUTO: 6 [PH] (ref 5–8)
PH UR STRIP: 5.5 [PH] (ref 4.5–8)
PLATELET # BLD AUTO: 318 X10(3)/MCL (ref 130–400)
PMV BLD AUTO: 9.3 FL (ref 7.4–10.4)
POTASSIUM SERPL-SCNC: 4.1 MMOL/L (ref 3.5–5.1)
PROT SERPL-MCNC: 8.2 GM/DL (ref 6.4–8.2)
PROT UR QL STRIP: 20 MG/DL
RBC # BLD AUTO: 4.13 X10(6)/MCL (ref 4.5–5.9)
RBC #/AREA URNS AUTO: ABNORMAL /[HPF]
SALICYLATES SERPL-MCNC: <1.7 MG/DL (ref 2.8–20)
SODIUM SERPL-SCNC: 138 MMOL/L (ref 136–145)
SP GR UR STRIP: 1.03 (ref 1–1.03)
SQUAMOUS #/AREA URNS LPF: ABNORMAL /[LPF]
TEMPERATURE, URINE (OHS): 25 DEGC (ref 20–25)
TROPONIN I SERPL-MCNC: 0.05 NG/ML (ref 0–0.05)
TROPONIN I SERPL-MCNC: 0.05 NG/ML (ref 0–0.05)
TSH SERPL-ACNC: 1.19 MIU/L (ref 0.36–3.74)
UROBILINOGEN UR STRIP-ACNC: 2 MG/DL
WBC # SPEC AUTO: 4.9 X10(3)/MCL (ref 4.5–11)
WBC #/AREA URNS AUTO: ABNORMAL /HPF

## 2019-01-23 ENCOUNTER — HISTORICAL (OUTPATIENT)
Dept: ADMINISTRATIVE | Facility: HOSPITAL | Age: 59
End: 2019-01-23

## 2019-01-23 LAB
ABS NEUT (OLG): 3.22 X10(3)/MCL (ref 2.1–9.2)
ALBUMIN SERPL-MCNC: 3.8 GM/DL (ref 3.4–5)
ALBUMIN/GLOB SERPL: 0.9 RATIO (ref 1.1–2)
ALP SERPL-CCNC: 88 UNIT/L (ref 45–117)
ALT SERPL-CCNC: 30 UNIT/L (ref 12–78)
AMPHET UR QL SCN: NEGATIVE
ANISOCYTOSIS BLD QL SMEAR: ABNORMAL
APPEARANCE, UA: CLEAR
AST SERPL-CCNC: 30 UNIT/L (ref 15–37)
BACTERIA #/AREA URNS AUTO: ABNORMAL /[HPF]
BARBITURATE SCN PRESENT UR: POSITIVE
BASOPHILS NFR BLD MANUAL: 0 %
BENZODIAZ UR QL SCN: POSITIVE
BILIRUB SERPL-MCNC: 0.3 MG/DL (ref 0.2–1)
BILIRUB UR QL STRIP: NEGATIVE
BILIRUBIN DIRECT+TOT PNL SERPL-MCNC: 0.1 MG/DL
BILIRUBIN DIRECT+TOT PNL SERPL-MCNC: 0.2 MG/DL
BUN SERPL-MCNC: 13 MG/DL (ref 7–18)
CALCIUM SERPL-MCNC: 8.3 MG/DL (ref 8.5–10.1)
CANNABINOIDS UR QL SCN: NEGATIVE
CHLORIDE SERPL-SCNC: 103 MMOL/L (ref 98–107)
CK MB SERPL-MCNC: 2.8 NG/ML (ref 1–3.6)
CK SERPL-CCNC: 317 UNIT/L (ref 39–308)
CO2 SERPL-SCNC: 25 MMOL/L (ref 21–32)
COCAINE UR QL SCN: NEGATIVE
COLOR UR: YELLOW
CREAT SERPL-MCNC: 1.1 MG/DL (ref 0.6–1.3)
EOSINOPHIL NFR BLD MANUAL: 0 %
ERYTHROCYTE [DISTWIDTH] IN BLOOD BY AUTOMATED COUNT: 20.4 % (ref 11.5–14.5)
GLOBULIN SER-MCNC: 4.2 GM/ML (ref 2.3–3.5)
GLUCOSE (UA): NORMAL
GLUCOSE SERPL-MCNC: 79 MG/DL (ref 74–106)
GRANULOCYTES NFR BLD MANUAL: 53 % (ref 43–75)
HCT VFR BLD AUTO: 30 % (ref 40–51)
HGB BLD-MCNC: 9.3 GM/DL (ref 13.5–17.5)
HGB UR QL STRIP: NEGATIVE
HYALINE CASTS #/AREA URNS LPF: ABNORMAL /[LPF]
KETONES UR QL STRIP: NEGATIVE
LEUKOCYTE ESTERASE UR QL STRIP: NEGATIVE
LIPASE SERPL-CCNC: 126 UNIT/L (ref 73–393)
LYMPHOCYTES NFR BLD MANUAL: 34 % (ref 20.5–51.1)
MCH RBC QN AUTO: 25.1 PG (ref 26–34)
MCHC RBC AUTO-ENTMCNC: 31 GM/DL (ref 31–37)
MCV RBC AUTO: 80.9 FL (ref 80–100)
MONOCYTES NFR BLD MANUAL: 12 % (ref 2–9)
NEUTS BAND NFR BLD MANUAL: 1 % (ref 0–10)
NITRITE UR QL STRIP: NEGATIVE
OPIATES UR QL SCN: NEGATIVE
PCP UR QL: NEGATIVE
PH UR STRIP.AUTO: 5.5 [PH] (ref 5–8)
PH UR STRIP: 5.5 [PH] (ref 4.5–8)
PLATELET # BLD AUTO: 380 X10(3)/MCL (ref 130–400)
PLATELET # BLD EST: NORMAL 10*3/UL
PMV BLD AUTO: 9 FL (ref 7.4–10.4)
POLYCHROMASIA BLD QL SMEAR: ABNORMAL
POTASSIUM SERPL-SCNC: 3.3 MMOL/L (ref 3.5–5.1)
PROT SERPL-MCNC: 8 GM/DL (ref 6.4–8.2)
PROT UR QL STRIP: 10 MG/DL
RBC # BLD AUTO: 3.71 X10(6)/MCL (ref 4.5–5.9)
RBC #/AREA URNS AUTO: ABNORMAL /[HPF]
RBC MORPH BLD: ABNORMAL
SODIUM SERPL-SCNC: 136 MMOL/L (ref 136–145)
SP GR UR STRIP: 1.01 (ref 1–1.03)
SQUAMOUS #/AREA URNS LPF: ABNORMAL /[LPF]
TEMPERATURE, URINE (OHS): 25 DEGC (ref 20–25)
TROPONIN I SERPL-MCNC: 0.06 NG/ML (ref 0–0.05)
UROBILINOGEN UR STRIP-ACNC: NORMAL
WBC # SPEC AUTO: 5.7 X10(3)/MCL (ref 4.5–11)
WBC #/AREA URNS AUTO: ABNORMAL /HPF

## 2019-01-24 LAB
ABS NEUT (OLG): 2.71 X10(3)/MCL
ALBUMIN SERPL-MCNC: 3.5 GM/DL (ref 3.4–5)
ALBUMIN/GLOB SERPL: 0.88 RATIO (ref 1.1–2)
ALP SERPL-CCNC: 84 UNIT/L (ref 45–117)
ALT SERPL-CCNC: 27 UNIT/L (ref 12–78)
ANISOCYTOSIS BLD QL SMEAR: NORMAL
AST SERPL-CCNC: 28 UNIT/L (ref 15–37)
BASOPHILS NFR BLD MANUAL: 1 %
BILIRUB SERPL-MCNC: 0.4 MG/DL (ref 0.2–1)
BILIRUBIN DIRECT+TOT PNL SERPL-MCNC: 0.2 MG/DL
BILIRUBIN DIRECT+TOT PNL SERPL-MCNC: 0.2 MG/DL
BUN SERPL-MCNC: 12 MG/DL (ref 7–18)
CALCIUM SERPL-MCNC: 8.1 MG/DL (ref 8.5–10.1)
CHLORIDE SERPL-SCNC: 106 MMOL/L (ref 98–107)
CO2 SERPL-SCNC: 25 MMOL/L (ref 21–32)
CREAT SERPL-MCNC: 1.1 MG/DL (ref 0.6–1.3)
EOSINOPHIL NFR BLD MANUAL: 3 %
ERYTHROCYTE [DISTWIDTH] IN BLOOD BY AUTOMATED COUNT: 19.9 % (ref 11.5–14.5)
GLOBULIN SER-MCNC: 4 GM/ML (ref 2.3–3.5)
GLUCOSE SERPL-MCNC: 70 MG/DL (ref 74–106)
GRANULOCYTES NFR BLD MANUAL: 64 % (ref 43–75)
HCT VFR BLD AUTO: 29.6 % (ref 40–51)
HGB BLD-MCNC: 9.2 GM/DL (ref 13.5–17.5)
LYMPHOCYTES NFR BLD MANUAL: 23 % (ref 20.5–51.1)
MAGNESIUM SERPL-MCNC: 1.8 MG/DL (ref 1.8–2.4)
MCH RBC QN AUTO: 25.1 PG (ref 26–34)
MCHC RBC AUTO-ENTMCNC: 31.1 GM/DL (ref 31–37)
MCV RBC AUTO: 80.7 FL (ref 80–100)
MONOCYTES NFR BLD MANUAL: 7 % (ref 2–9)
NEUTS BAND NFR BLD MANUAL: 2 % (ref 0–10)
PHOSPHATE SERPL-MCNC: 2.9 MG/DL (ref 2.5–4.9)
PLATELET # BLD AUTO: 379 X10(3)/MCL (ref 130–400)
PLATELET # BLD EST: NORMAL 10*3/UL
PMV BLD AUTO: 9.3 FL (ref 7.4–10.4)
POLYCHROMASIA BLD QL SMEAR: NORMAL
POTASSIUM SERPL-SCNC: 3.5 MMOL/L (ref 3.5–5.1)
PROT SERPL-MCNC: 7.5 GM/DL (ref 6.4–8.2)
RBC # BLD AUTO: 3.67 X10(6)/MCL (ref 4.5–5.9)
RBC MORPH BLD: NORMAL
SODIUM SERPL-SCNC: 139 MMOL/L (ref 136–145)
TROPONIN I SERPL-MCNC: 0.07 NG/ML (ref 0–0.05)
WBC # SPEC AUTO: 4.9 X10(3)/MCL (ref 4.5–11)

## 2021-07-01 ENCOUNTER — PATIENT MESSAGE (OUTPATIENT)
Dept: ADMINISTRATIVE | Facility: OTHER | Age: 61
End: 2021-07-01

## 2022-09-13 NOTE — HISTORICAL [facility] C[facility]ER
This is a historical note converted from [employer]. Formatting and pictures may have been removed.  Please reference [employer] for original formatting and attached multimedia. Chief Complaint  Feeling well w/o significant complaint  History of Present Illness  Pt is a 57 y/o which was admitted with PSA and heart failure.? Ultimately developed shock with hypoglycemia.? Resuscitation resolved the shock but hypoglycemia persisted leading to Endocrine consult.? Since, pt has passed a oxana stim and TSH wnl.? Over the weekend, pt did well.? Nutrition is improving, hypoglycemia resolved.? No complaints today including no hypoglycemia.  Review of Systems  As per HPI.  Physical Exam  Vitals & Measurements  T:?36? ?C (Oral)? HR:?88(Peripheral)? RR:?16? BP:?135/80? BP:?147/110(Line)? SpO2:?97%?  HT:?178?cm? WT:?63.3?kg? BMI:?21.46?  GENERAL APPEARANCE: ?NAD, well developed and well nourished.  HEENT: ?Head - NC/AT.  HEART:??ROMY, regular rate and rhythm, normal S1 and S2.  LUNGS: ?clear to auscultation bilaterally, good air entry bilaterally, no wheezes, no rales, no rhonchi.  ABDOMEN: ?soft, NT/ND, BS present.  NEUROLOGIC EXAM: ?non-focal exam, CN II-XII grossly intact  DERMATOLOGIC: ?no new?rash or skin lesions, warm, dry.  PSYCHIATRIC: ?alert and oriented, cognitive function intact, cooperative with exam, good eye contact, judgement and insight intact, mood and affect full range.  Assessment/Plan  1.?Hypoglycemia  ? Appears resolved, suspect was due to?a combination of shock, mismatch, and poor nutrition.  2.?Malnutrition  ? Is improving, defer further mgmt to primary team  3.?Systolic CHF  ?Further cardiac disease and prior resolved shock?could have played into hypoglycemia?  5.?Polysubstance abuse  ? Further played into malnutrition  As the hypoglycemia has resolved will sign off.? Please reconsult with further questions.   Problem List/Past Medical History  Ongoing  Acute disease or injury-related malnutrition  Alcohol  abuse  Asthma  CHF NYHA class II  COPD - Chronic obstructive pulmonary disease  Defibrillator  Depression  HTN (hypertension)  MI (myocardial infarction)  ZAMZAM - Obstructive sleep apnea  ppm  Tobacco user  Historical  Anxiety  Asthma  Bipolar  CHF (congestive heart failure)  Depression  HLD (hyperlipidemia)  HTN (hypertension)  SOB (shortness of breath)  Procedure/Surgical History  Detoxification Services for Substance Abuse Treatment (04/08/2017), Fluoroscopy of Left Heart using Low Osmolar Contrast (03/11/2016), Fluoroscopy of Multiple Coronary Arteries using Low Osmolar Contrast (03/11/2016), Measurement of Cardiac Sampling and Pressure, Left Heart, Percutaneous Approach (03/11/2016), CARDIOVERTER-DEFIBRILLATOR, SINGLE CHAMBER (IMPLANTABLE) (07/10/2015), Implantation or replacement of automatic cardioverter/defibrillator, total system [AICD] (07/10/2015), Insertion of Antimicrobial Envelope (07/10/2015), Insertion or replacement of permanent implantable defibrillator system, with transvenous lead(s), single or dual chamber (07/10/2015), Insertion Single/Dual Desiree Pulse Generator Leads (None) (07/10/2015), LEAD, CARDIOVERTER-DEFIBRILLATOR, ENDOCARDIAL SINGLE COIL (IMPLANTABLE) (07/10/2015), Insertion of coronary artery stent (12/04/2014), Coronary arteriography using a single catheter (11/17/2014), Left heart cardiac catheterization (11/17/2014), Left Heart Cath w/COR Angio Ventriculogr (None) (11/17/2014), Application of finger splint; dynamic. (09/04/2014), Application of splint (09/04/2014), Right elbow fracture repair (03/03/2009), Tendon repair left forearm (02/02/2003), cardiac stents x 2, RT ARM FX REPAIR.  Medications  acetaminophen 500 mg oral tablet, 1000 mg= 2 tab(s), Oral, Once-Unscheduled, PRN  alPRAzolam 0.25 mg oral tab, 1 mg= 4 tab(s), Oral, BID, PRN  alPRAzolam 1 mg oral tablet, See Instructions, 1 refills  amiodarone 200 mg oral Tab, 200 mg= 1 tab(s), Oral, Daily  amiodarone 200 mg oral Tab, 200  mg= 1 tab(s), Oral, Daily, 3 refills,? ?Unable to obtain: upon speaking to the pt he presented very confused and is not sure what medications he takes  aspirin 81 mg oral tablet, 81 mg, Oral, Daily, 3 refills,? ?Unable to obtain: upon speaking to the pt he presented very confused and is not sure what medications he takes  aspirin 81 mg oral tablet, CHEWABLE, 81 mg= 1 tab(s), Oral, Daily  atorvastatin 40 mg oral tablet, 40 mg= 1 tab(s), Oral, Daily, 3 refills,? ?Unable to obtain: upon speaking to the pt he presented very confused and is not sure what medications he takes  CeleXA 40 mg oral tablet, 40 mg= 1 tab(s), Oral, Daily,? ?Unable to obtain: upon speaking to the pt he presented very confused and is not sure what medications he takes  Coreg 25 mg oral tablet, 25 mg= 1 tab(s), Oral, BID, 3 refills,? ?Unable to obtain: upon speaking to the pt he presented very confused and is not sure what medications he takes  Coreg 3.125 mg oral tablet, 3.125 mg= 1 tab(s), Oral, BIDWMeal  Crestor 10 mg oral tablet, 20 mg= 2 tab(s), Oral, Daily  D50W vial, 25 gm= 50 mL, IV Push, Once-Unscheduled, PRN  DuoNeb 0.5 mg-2.5 mg/3 mL inhalation solution, 3 mL, NEB, q4hr Resp, PRN  ferrous sulfate 300 mg/5 mL oral liquid, 300 mg= 5 mL, Oral, Daily  folic acid 1 mg oral tablet, 1 mg= 1 tab(s), Oral, Daily  folic acid 1 mg oral tablet, 1 mg= 1 tab(s), Oral, Daily,? ?Unable to obtain: upon speaking to the pt he presented very confused and is not sure what medications he takes  Haldol 5 mg/mL injectable solution, 5 mg= 1 mL, IM, q4hr, PRN  hydrOXYzine pamoate 25 mg oral capsule, 25 mg= 1 cap(s), Oral, Once-Unscheduled, PRN  isosorbide MONOnitrate 30 mg oral tablet, Extended Release, 30 mg= 1 tab(s), Oral, qAM, 1 refills,? ?Unable to obtain: upon speaking to the pt he presented very confused and is not sure what medications he takes  labetalol 5 mg/mL intravenous solution, 10 mg= 2 mL, IV Push, q2hr, PRN  Lasix 40 mg oral tablet, 40 mg= 1  tab(s), Oral, Daily, 1 refills,? ?Unable to obtain: upon speaking to the pt he presented very confused and is not sure what medications he takes  Lasix 40 mg oral tablet, 40 mg= 1 tab(s), Oral, Daily, 3 refills,? ?Unable to obtain: upon speaking to the pt he presented very confused and is not sure what medications he takes  lisinopril 10 mg oral tablet, 10 mg= 1 tab(s), Oral, Daily, 3 refills,? ?Unable to obtain: upon speaking to the pt he presented very confused and is not sure what medications he takes  lisinopril 5 mg oral tablet, 10 mg= 1 tab(s), Oral, Daily  Lovenox, 40 mg= 0.4 mL, Subcutaneous, Daily  Mucomyst, 200 mg= 2 mL, NEB, q4hr Resp, PRN  nitroglycerin 0.4 mg sublingual TAB, 0.4 mg= 1 tab(s), SL, q5min, PRN, 1 refills,? ?Unable to obtain: upon speaking to the pt he presented very confused and is not sure what medications he takes  Plavix 75 mg oral tablet, 75 mg= 1 tab(s), Oral, Daily  Plavix 75 mg oral tablet, 75 mg= 1 tab(s), Oral, Daily, 3 refills,? ?Unable to obtain: upon speaking to the pt he presented very confused and is not sure what medications he takes  potassium chloride 99 mg oral tablet, 99 mg= 1 tab(s), Oral, Daily,? ?Unable to obtain: upon speaking to the pt he presented very confused and is not sure what medications he takes  Protonix, 40 mg= 1 tab(s), Oral, Daily  spironolactone 25 mg oral tablet, 25 mg= 1 tab(s), Oral, Daily, 3 refills,? ?Unable to obtain: upon speaking to the pt he presented very confused and is not sure what medications he takes  thiamine, 250 mg= 2.5 mL, IV, Daily  Zofran 2 mg/mL injectable solution, 4 mg= 2 mL, IV Push, q4hr, PRN  Zoloft 50 mg oral tablet, 50 mg= 1 tab(s), Oral, Daily  Allergies  Bactrim  ibuprofen?(Rash)  sulfamethoxazole-trimethoprim  traMADol  trimethoprim?(Rash)  Social History  Alcohol - Denies Alcohol Use, 07/23/2014  Current, 05/03/2017  Employment/School  Unemployed, Other: middle school., 02/28/2015  Exercise  Self assessment: Fair  condition., 07/26/2017  Home/Environment  Living situation: Homeless/Shelter. Home equipment: CPAP/BiPAP., 04/07/2017  Nutrition/Health  Regular, 07/26/2017  Substance Abuse - Denies Substance Abuse, 11/13/2014  Current, Cocaine, 1-2 times per week, Previous treatment: None. IV drug use: No. Drug use interferes with work/home: Yes. Ready to change: Yes. Household substance abuse concerns: Yes., 04/07/2017  Current, Marijuana, Daily, Previous treatment: None. Drug use interferes with work/home: Yes. Ready to change: Yes. Household substance abuse concerns: Yes., 04/07/2017  Current, Cocaine, 02/09/2017  Marijuana, 07/23/2014  Tobacco - No Risk, 11/13/2014  Former smoker, 10/01/2017  Current every day smoker, Cigarettes, 5 per day. Previous treatment: None. Ready to change: No. Household tobacco concerns: No., 04/07/2017  Family History  Angina: Mother.  COPD (chronic obstructive pulmonary disease).: Father.  Cancer: Father.  Diabetes mellitus type 2: Mother.  Heart failure.: Mother.  Heart murmur.: Mother.  Hypertension.: Mother, Father and Brother.  Primary malignant neoplasm of lung: Father.  Stroke: Mother and Brother.  Immunizations  Vaccine Date Status Comments   tetanus/diphtheria/pertussis, acel(Tdap) 05/02/2018 Recorded [5/7/2018] Immunization record scanned into chart   influenza virus vaccine, inactivated - Not Given Patient Refuses  pt had injection at another [facility] beginning of flu season   pneumococcal 23-polyvalent vaccine - Not Given Patient Refuses     influenza virus vaccine, inactivated 11/17/2014 Given Med Not Available   pneumococcal 23-polyvalent vaccine 11/17/2014 Given Med Not Available   Lab Results  Reviewed per chart

## 2022-09-13 NOTE — HISTORICAL [facility]
This is a historical note converted from Guille. Formatting and pictures may have been removed.  Please reference Guille for original formatting and attached multimedia. Chief Complaint  found AMS, cool, diaphoretic, lethargic. pt from [facility] for polysubstance abuse and trying to hang himself in ED. cbg 68. intial BP with AASI 73/43 and O2 82% RA. 100% on NRB baseline: gcs 15  History of Present Illness  This is a 58-year-old male with a history of?ischemic cardiomyopathy?with ejection fraction of 15-20%?on AICD, atrial fibrillation, ZAMZAM on BIPAP sent from?[facility]?facility after?a syncopal episode. ?Per facility report, patient was eating dinner and slide out of his chair?and was?unresponsive.??EMS was called. ?Patient is a very poor historian. ?Patient report?that he?has no appetite for the past few days?and have been skipping meals?he did not anything today?until the time of dinner. ?He remembers?eating dinner at the table?and the next thing he wakes up in the ambulance.? He feels weak and fatigued. ?Denies any chest pain, shortness of breath, palpitation, lightheadedness, headache, double vision, focal extremity weakness, difficulty speaking?or swallowing. Blood glucose in the ambulance?was 68 was given D50.  ?   ED course:?On arrival?blood pressure?80s over 50s?but maintaining?MAP more than 65 throughout. ?Blood glucose?initially 88, then dropped to 67?where?he got?another D50.  ?   Admitted to hospitalist service?for syncopal episode?and hypoglycemia.  ?  Review of Systems  Except as documented, all other systems reviewed and are negative.  Physical Exam  Vitals & Measurements  T:?37? ?C (Oral)? TMIN:?36.0? ?C (Temporal Artery)? TMAX:?37? ?C (Oral)? HR:?70(Peripheral)? RR:?18? BP:?92/60? BP:?71/51(Sitting)? BP:?87/55(Supine)? SpO2:?100%? WT:?63.5?kg? WT:?63.5?kg?  General: Appears comfortable in bed  HEENT:?NC/AT, EOMI, PERRLA, dry oral mucosa  Neck:?No JVD  Chest:?CTABL, no  rales or rhonchi, no accessory muscle use, AICD in left upper chest nontender to palpation  CVS:?Regular rhythm. pansystolic murmur grade 3?loudest at LLSB but?present?all over the precordium  Abdomen:?Nondistended, normoactive bowel sound, soft and non-tender.  Extremities:?no clubbing or cyanosis  Skin:?Warm and dry, no rashes or lesions  Neuro:?AAOx3, LE power 4/5 bilaterally proximal and distal. UE 5/5.  Psych:?Cooperative  Assessment/Plan  Syncope - DDX (hypoglycemic secondary to poor p.o. intake, or arrhythmias), low?suspicion for CVA  Symptomatic?hypoglycemia  Hypotension secondary to volume depletion  History of?CAD,?ischemic cardiomyopathy, ZAMZAM and atrial fibrillation  ?  Plan:  ?  Telemetry  ICD interrogation (per ED RN, [facility]?tech?informed?that the results were normal,?awaiting official report)  Fingerstick glucose?every 4?hours  Gentle IV fluids  Nutrition consult  Carotid ultrasound  Resume home medication as appropriate (hold diuresis tonight)  Resume?BiPAP?nightly  ?  VTE Prophylaxis:?SCDs  GI Prophylaxis:? Pantoprazole  ?   Disposition: Observation -? Telemetry unit  ?   Advance directives: None  ?   Time spent on admission: 72 minutes  ?   Problem List/Past Medical History  CAD s/p PCI x 2 stent, ischemic cardiomyopathy on AICD, last TTE 10/2018: EF 15-20%  Depression, Suicidal ideation/?attempt after which placed in psychiatric facility  Cognitive impairment  Hypertension  Atrial fibrillation  ZAMZAM on BIPAP nightly  Procedure/Surgical History  Insertion of single lead?AICD  on 7/10/2015  Left Heart Cath with x2 stent placement?on 11/17/2014  Right arm fracture  Medications  Inpatient  acetaminophen-hydrocodone 325 mg-7.5 mg oral tablet, 1 tab(s), Oral, q6hr, PRN  amiodarone 200 mg oral Tab, 200 mg= 1 tab(s), Oral, Daily  aspirin 81 mg oral tablet, CHEWABLE, 81 mg= 1 tab(s), Oral, Daily  atorvastatin 40 mg oral tablet, 40 mg= 1 tab(s), Oral, Daily  Coreg 3.125 mg oral tablet, 3.125 mg= 1 tab(s),  Oral, BIDWMeal  Dextrose 50% and Water (50 mL vial/syringe), 25 gm= 50 mL, IV Push, As Directed, PRN  Dextrose 50% and Water (50 mL vial/syringe), 12.5 gm= 25 mL, IV Push, Once, PRN  Dextrose 50% and Water (50 mL vial/syringe), 12.5 gm= 25 mL, IV Push, As Directed, PRN  Dextrose 50% in Water intravenous solution, 12.5 gm= 25 mL, IV Push, As Directed, PRN  Effexor XR 37.5 mg oral capsule, extended release, 37.5 mg= 1 cap(s), Oral, Daily  finasteride 5 mg oral tablet, 5 mg= 1 tab(s), Oral, Daily  glucagon, 1 mg= 1 EA, IM, q10min, PRN  glucagon, 1 mg= 1 EA, IM, q10min, PRN  lisinopril 10 mg oral tablet, 10 mg= 1 tab(s), Oral, Daily   500 mL, 500 mL, IV  NS (0.9% Sodium Chloride) Infusion 500 mL, 500 mL, IV   mL, 500 mL, IV  OLANZapine, 10 mg= 2 tab(s), SL, q2hr, PRN  Pantoprazole 40 mg ORAL EC-Tablet, 40 mg= 1 tab(s), Oral, Daily  Plavix 75 mg oral tablet, 75 mg= 1 tab(s), Oral, Daily  Remeron 15 mg oral tablet, 15 mg= 1 tab(s), Oral, Once a day (at bedtime)  Home  amiodarone 200 mg oral Tab, 200 mg= 1 tab(s), Oral, Daily, 5 refills  atorvastatin 40 mg oral tablet, 40 mg= 1 tab(s), Oral, Daily, 1 refills  Benadryl 25 mg oral capsule, 25 mg= 1 cap(s), Oral, q4hr  BIPAP, See Instructions  Coreg 3.125 mg oral tablet, 3.125 mg= 1 tab(s), Oral, BIDWMeal, 3 refills  Dulcolax Laxative 5 mg ORAL enteric coated tablet, 10 mg= 2 tab(s), Oral, BID, PRN  Effexor XR 37.5 mg oral capsule, extended release, 37.5 mg= 1 cap(s), Oral, Daily  finasteride 5 mg oral tablet, 5 mg= 1 tab(s), Oral, Daily, 3 refills  FLUZONE QUAD 2018-19 INJ 0.5ML, IM, As Directed  furosemide 40 mg oral tablet, 40 mg= 1 tab(s), Oral, BID, 6 refills  Imodium 2 mg oral tablet, See Instructions  lisinopril 10 mg oral tablet, 10 mg= 1 tab(s), Oral, Daily  Milk of Magnesia 8% oral susp, 1.2 gm= 15 mL, Oral, q4hr  OLANZapine, 10 mg, SL, q2hr  Plavix 75 mg oral tablet, 75 mg= 1 tab(s), Oral, Daily, 3 refills  Remeron 15 mg oral tablet, 15 mg= 1 tab(s),  Oral, Once a day (at bedtime)  Tylenol, 325 mg, Oral, q4hr  Ventolin HFA 90 mcg/inh inhalation aerosol, 2 puff(s), INH, QID, 2 refills  Vistaril 25 mg oral capsule, 25 mg= 1 cap(s), Oral, TID  Vistaril 50 mg oral capsule, 50 mg= 1 cap(s), Oral, q4hr  Allergies  Bactrim  codeine?(Codeine)  ibuprofen?(Rash)  sulfamethoxazole-trimethoprim  traMADol  trimethoprim?(Rash)  Family History  Angina: Mother.  COPD (chronic obstructive pulmonary disease).: Father.  Cancer: Father.  Diabetes mellitus type 2: Mother.  Heart failure.: Mother.  Heart murmur.: Mother.  Hypertension.: Mother, Father and Brother.  Primary malignant neoplasm of lung: Father.  Stroke: Mother and Brother.  Lab Results  Labs Last 24 Hours?  ?Chemistry? Hematology/Coagulation?   Sodium Lvl: 138 mmol/L (12/09/18 17:44:00) WBC: 4.7 x10(3)/mcL (12/09/18 17:44:00)   Potassium Lvl: 4.1 mmol/L (12/09/18 17:44:00) RBC: 4.79 x10(6)/mcL (12/09/18 17:44:00)   Chloride: 105 mmol/L (12/09/18 17:44:00) Hgb:?12.2 gm/dL?Low (12/09/18 17:44:00)   CO2: 27 mmol/L (12/09/18 17:44:00) Hct:?40.5 %?Low (12/09/18 17:44:00)   Calcium Lvl: 9 mg/dL (12/09/18 17:44:00) Platelet:?402 x10(3)/mcL?High (12/09/18 17:44:00)   Glucose Lvl: 83 mg/dL (12/09/18 17:44:00) MCV: 84.6 fL (12/09/18 17:44:00)   BUN: 13 mg/dL (12/09/18 17:44:00) MCH:?25.5 pg?Low (12/09/18 17:44:00)   Creatinine:?1.51 mg/dL?High (12/09/18 17:44:00) MCHC:?30.1 gm/dL?Low (12/09/18 17:44:00)   eGFR-AA: >60 (12/09/18 17:44:00) RDW:?20.9 %?High (12/09/18 17:44:00)   eGFR-BELKYS: 51 mL/min/1.73 m2 (12/09/18 17:44:00) MPV:?8.9 fL?Low (12/09/18 17:44:00)   Bili Total: 0.7 mg/dL (12/09/18 17:44:00) Abs Neut: 2.92 x10(3)/mcL (12/09/18 17:44:00)   Bili Direct: 0.2 mg/dL (12/09/18 17:44:00) Neutro Auto: 62 % (12/09/18 17:44:00)   Bili Indirect: 0.5 mg/dL (12/09/18 17:44:00) Lymph Auto: 27 % (12/09/18 17:44:00)   AST: 34 unit/L (12/09/18 17:44:00) Mono Auto: 9 % (12/09/18 17:44:00)   ALT: 25 unit/L (12/09/18 17:44:00) Eos Auto: 1 %  (12/09/18 17:44:00)   Alk Phos: 73 unit/L (12/09/18 17:44:00) Abs Eos: 0 x10(3)/mcL (12/09/18 17:44:00)   Total Protein: 8.2 gm/dL (12/09/18 17:44:00) Basophil Auto: 1 % (12/09/18 17:44:00)   Albumin Lvl: 3.7 gm/dL (12/09/18 17:44:00) Abs Neutro: 2.92 x10(3)/mcL (12/09/18 17:44:00)   Globulin:?4.5 gm/dL?High (12/09/18 17:44:00) Abs Lymph: 1.3 x10(3)/mcL (12/09/18 17:44:00)   A/G Ratio: 0.8 (12/09/18 17:44:00) Abs Mono: 0.4 x10(3)/mcL (12/09/18 17:44:00)   Troponin-I: 0.42 ng/mL (12/09/18 17:44:00) Abs Baso: 0 x10(3)/mcL (12/09/18 17:44:00   Diagnostic Results  ?ICD interrogation: normal per RN report, awaiting official report.      *Correction:  --Patient does not have history of atrial fibrillation. ?He has a history of nonsustained V. tach?following with cardiology. ?EKG today?unchanged compared to?10/2018. ?Showed previous inferior Q waves, LVH by voltage criteria, left axis deviation, normal sinus rhythm.  --For VTE prophylaxis start on enoxaparin   Late entry:  ?  Critical care time 35 minutes  Critical care diagnosis: hypotension requiring mutiple fluid boluses for resusitation

## 2022-09-13 NOTE — HISTORICAL [facility] CERNER
This is a historical note converted from Guille. Formatting and pictures may have been removed.  Please reference Guille for original formatting and attached multimedia. Admit date:?8/7/18  Discharge date:?8/8/18  ?   Admitting physician:?Karen Arzate  Discharge physician:??Karen Arzate  ?   Discharge condition:?stable  Indication for admission:?NSTEMI, Urinary retention, UTI  ?   Hospital course:?Patient came to the ED with urinary symptoms?from a three-week long?Romero catheter?placed?at [facility]?upon prior presentation with urinary retention,?mentioned he stopped taking his Lasix for CHF?for 3 days prior?to presentation?at [facility]?because?he was having?pain?upon urination. ?Patient noted?some mild chest?discomfort and?troponins were drawn?and noted to be?elevated, patient admitted for NSTEMI. ?Serial EKGs and troponins were trended down. ?Lexiscan?done on the?8/8/18.??Romero catheter was removed the patient was able to urinate.? Urinary?symptoms have subsided?patient to be discharged on oral antibiotics. ?Follow-up urine cultures?to ensure sensitivity.  Consults:?none  ?  Diagnostic studies:?LExiscan  Treatments:?rocephin for UTI  ?  Disposition:?38-year-old male?being treated for urinary tract infection?with Keflex at home. ?Also, patient was found to have?troponins?elevated upon admission, but never had chest pain, service of breath.? Continue close follow-up with CHF clinic?and cardiology clinic  ?  Discharge medications:?Keflex 250 4 times a day?for 5 days  ?  Activity:?As tolerated by patient  Diet:?Cardiac diet  Wound Care:?None  Follow-up:?One week?with congestive heart failure clinic, and in 2 weeks with cardiology clinic  ?  Plan discussed with patient and all questions answered:?  ?   I was present with the Resident during discharge day management.  ???  [x ] I discussed the case with the Resident and agree with the discharge plan as above.  [ ] I discussed the case with the Resident and agree  with the discharge plan as above except:  ???  Time spent on discharge [ 35] minutes  Hx of CHF with EF 10-15%, CAD- s/p stents x2  AICD placement  Cocaine USE  Lexiscan showed fixed  ?   Lexiscan showed fixed defects.  Urine culture pending. Patient improved after removed weller catheter and voided freely.  Patient with history of urinary retention 3 weeks prior and weller cather was place. Prostate was not enlarged or tender on examination by residents on admission, weller cather was removed.

## 2022-09-13 NOTE — HISTORICAL [facility] CERNER
This is a historical note converted from [employer]. Formatting and pictures may have been removed.  Please reference Cerclem for original formatting and attached multimedia. Chief Complaint  Left-sided chest pain?at ICD site  History of Present Illness  58-year-old -American with a past medical history ischemic cardiomyopathy with EF 16% by Lexiscan on 8-8-18, NYHA class III, CAD status post stent placement in 2013, AICD placed 3 years ago, CVA, COPD, cocaine use is being admitted for acute CHF exacerbation and VIJI.  ?  Patient is seen and examined today.? Patient complains of left-sided chest pain over ICD site.? Patient reported to have pain intermittently since the placement of pacemaker which has been worsening now.? Patient also reports of orthopnea,using 2pillows?and PND.? Patient denied any typical/atypical chest pain concerning acute CAD, palpitations, swelling of feet, swelling of abdomen, increased urine output.  Review of Systems  Constitutional: No?Fatigue, No fever, No chills, No sweats, No weakness, No decreased activity. ?  Eye: ?No recent visual problem, No icterus, No blurring, No double vision. ?  Ear/Nose/Mouth/Throat: ?No nasal congestion, No sore throat. ?  Respiratory: ?Shortness of breath, No cough, No sputum production, No hemoptysis, No wheezing. ?  Cardiovascular: Left-sided chest pain over ICD site,?no palpitations, No bradycardia, No tachycardia, No peripheral edema. ?  Gastrointestinal: ?No nausea, No vomiting, No diarrhea, No constipation, No heartburn, No abdominal pain. ?  Genitourinary: ?No dysuria, No hematuria, No change in urine stream, No urethral discharge.????  Integumentary: ?No rash, No pruritus, No abrasions, No breakdown, No burns, No dryness. ?  Neurologic: ?Alert and oriented X4, No abnormal balance, No confusion, No numbness, No tingling. ?  Psychiatric: ?No anxiety, No depression, No karol, Not suicidal. ?  ?  Physical Exam  Vitals & Measurements  T:?36.5? ?C (Oral)?  TMIN:?36.4? ?C (Oral)? TMAX:?36.9? ?C (Oral)? HR:?82(Peripheral)? RR:?18? BP:?114/77? SpO2:?93%?  ?  General: ?Alert and oriented,,  HENT: ?Normocephalic, Normal hearing, Oral mucosa is moist. ?  Neck: ?Supple, No lymphadenopathy, No thyromegaly. ?  Respiratory:?Bilateral basal crackles, Respirations are non-labored, chest wall tenderness. ?  Cardiovascular: ?Normal rate, Regular rhythm, No murmur,? No edema. ?  Gastrointestinal: ?Soft, Non-tender. ?  Lymphatics: ?No lymphadenopathy neck, axilla, groin. ?  Musculoskeletal: ?Normal range of motion, Normal strength, No tenderness, No swelling, No deformity, Normal gait. ?  Integumentary: ?Warm, Pink, Intact, Moist, No pallor. ?  Neurologic: ?Alert, Oriented, Normal sensory, Normal motor function, Cranial Nerves II-XII are grossly intact, Normal deep tendon reflexes, Normal monofilament exam. ?  Psychiatric: ?Cooperative, Appropriate mood & affect, Normal judgment. ?  ?  Assessment/Plan  Acute on CHF exacerbation status post ICD placement  Etiology likely secondary to ischemic cardiomyopathy vs drug abuse  NYHA class III  Patient reported to have orthopnea with 2 pillows , PND and bilateral basal crackles on examination.  Elevated proBNP greater than 1500  EF? 16% with Lexiscan on 8-8-18  Echocardiogram in May 2018 resulted EF 10-15% with left ventricular size severely increased and mildly enlarged right ventricular cavity  Ordered chest x-ray to review ICD placement and assess pulmonary edema  Start on Lasix 40 mg IV twice daily  Continue lisinopril 15 mg, Coreg, Aldactone 25 mg  Follow-up with BMP, magnesium levels  Strict I&Os and weight monitoring  ?  CAD status post stent placement (PCI of LAD 50-60%)  No acute symptoms  Continue aspirin, Plavix, atorvastatin 40 mg  ?  Obstructive sleep apnea  Continue CPAP  ?  Patient?reported to have orthopnea PND?and bilateral basal crackles on examination?so ordered chest x-ray?to assess pulmonary edema?and reassess?ICD  placement?, started on Lasix 40 mg IV twice daily?and follow-up with?BMP and magnesium levels.  ?  ?   Problem List/Past Medical History  Ongoing  Acute disease or injury-related malnutrition  Alcohol abuse  Asthma  Chest pain  Chest pain  CHF NYHA class II  Chronic disease-related malnutrition  COPD - Chronic obstructive pulmonary disease  Defibrillator  Depression  Depression(  Confirmed  )  HTN (hypertension)  Knowledge deficit  MI (myocardial infarction)  Morbid obesity  ZAMZAM - Obstructive sleep apnea  ppm  Tobacco user  Tobacco user  Tobacco user  Tobacco user(  Probable Diagnosis  )  Historical  Anxiety  Asthma  Bipolar  CHF (congestive heart failure)  Depression  HLD (hyperlipidemia)  HTN (hypertension)  SOB (shortness of breath)  Procedure/Surgical History  Insertion of Endotracheal Airway into Trachea, Via Natural or Artificial Opening (05/18/2018)  Insertion of Infusion Device into Superior Vena Cava, Percutaneous Approach (05/18/2018)  Respiratory Ventilation, 24-96 Consecutive Hours (05/18/2018)  Ultrasonography of Superior Vena Cava, Guidance (05/18/2018)  Detoxification Services for Substance Abuse Treatment (04/08/2017)  Fluoroscopy of Left Heart using Low Osmolar Contrast (03/11/2016)  Fluoroscopy of Multiple Coronary Arteries using Low Osmolar Contrast (03/11/2016)  Measurement of Cardiac Sampling and Pressure, Left Heart, Percutaneous Approach (03/11/2016)  CARDIOVERTER-DEFIBRILLATOR, SINGLE CHAMBER (IMPLANTABLE) (07/10/2015)  Implantation or replacement of automatic cardioverter/defibrillator, total system [AICD] (07/10/2015)  Insertion of Antimicrobial Envelope (07/10/2015)  Insertion or replacement of permanent implantable defibrillator system, with transvenous lead(s), single or dual chamber (07/10/2015)  Insertion Single/Dual Desiree Pulse Generator Leads (None) (07/10/2015)  LEAD, CARDIOVERTER-DEFIBRILLATOR, ENDOCARDIAL SINGLE COIL (IMPLANTABLE) (07/10/2015)  Insertion of coronary artery  stent (12/04/2014)  Coronary arteriography using a single catheter (11/17/2014)  Left heart cardiac catheterization (11/17/2014)  Left Heart Cath w/COR Angio Ventriculogr (None) (11/17/2014)  Application of finger splint; dynamic. (09/04/2014)  Application of splint (09/04/2014)  Right elbow fracture repair (03/03/2009)  Tendon repair left forearm (02/02/2003)  cardiac stents x 2  RT ARM FX REPAIR   Medications  Inpatient  albuterol 2.5 mg/3 mL (0.083%) inhalation solution, 2.5 mg= 3 mL, NEB, q6hr Resp  alPRAzolam 0.25 mg oral tab, 0.25 mg= 1 tab(s), Oral, At Bedtime  amiodarone 200 mg oral Tab, 200 mg= 1 tab(s), Oral, Daily  aspirin, 81 mg= 1 tab(s), Oral, Daily  atorvastatin 20 mg oral tablet, 40 mg= 2 tab(s), Oral, Daily  azithromycin 250 mg oral tablet, 500 mg= 2 tab(s), Oral, Once  Coreg 3.125 mg oral tablet, 6.25 mg= 2 tab(s), Oral, BID  finasteride 5 mg oral tablet, 5 mg= 1 tab(s), Oral, Daily  folic acid 1 mg oral tablet, 1 mg= 1 tab(s), Oral, Daily  lisinopril 10 mg oral tablet, 15 mg= 3 tab(s), Oral, Daily  Lovenox, 40 mg= 0.4 mL, Subcutaneous, Daily  morphine 1 mg/mL PF injectable solution, 1 mg= 0.25 mL, IV Push, q6hr, PRN  nitroglycerin 0.4 mg sublingual TAB, 0.4 mg= 1 tab(s), SL, q5min, PRN  Plavix 75 mg oral tablet, 75 mg= 1 tab(s), Oral, Daily  potassium chloride 20 mEq oral TABLET extended release, 20 mEq= 1 tab(s), Oral, BID  spironolactone, 25 mg= 1 tab(s), Oral, Daily  Tylenol, 325 mg= 1 tab(s), Oral, q6hr, PRN  Home  Advil PM Liqui-Gels 25 mg-200 mg oral capsule, 2 cap(s), Oral, Once a day (at bedtime), PRN  alPRAzolam 1 mg oral tablet, 1 mg= 1 tab(s), Oral, BID, 1 refills  AMIODARONE 200MG TABLETS, 200 mg= 1 tab(s), Oral, Daily  ASPIRIN 81MG EC LOW DOSE TABLETS, 81 mg= 1 tab(s), Oral, Daily  atorvastatin 40 mg oral tablet, 40 mg= 1 tab(s), Oral, Daily, 3 refills,? ?Not taking  BIPAP, See Instructions  BOOSTRIX INJ 0.5ML, IM, As Directed,? ?Not taking  Coreg 3.125 mg oral tablet, 3.125 mg= 1  tab(s), Oral, BIDWMeal  finasteride 5 mg oral tablet, 5 mg= 1 tab(s), Oral, Daily, 3 refills  folic acid 1 mg oral tablet, 1 mg= 1 tab(s), Oral, Daily,? ?Not taking  furosemide 40 mg oral tablet, See Instructions, 3 refills,? ?Not taking  HYDROCODONE/ACETAMINOPHEN 7.5-325 T, 1 tab(s), Oral, q8hr,? ?Not taking  Lasix 40 mg oral tablet, 40 mg= 1 tab(s), Oral, BID  lisinopril 10 mg oral tablet, 10 mg= 1 tab(s), Oral, Daily, 3 refills  nitroglycerin 0.4 mg sublingual TAB, 0.4 mg= 1 tab(s), SL, q5min, PRN, 1 refills  Plavix 75 mg oral tablet, 75 mg= 1 tab(s), Oral, Daily, 3 refills  potassium chloride 99 mg oral tablet, 99 mg= 1 tab(s), Oral, Daily  spironolactone 50 mg oral tablet, 50 mg= 1 tab(s), Oral, Daily  Ventolin HFA 90 mcg/inh inhalation aerosol, 2 puff(s), INH, QID, 1 refills  VENTOLIN HFA INH W/DOS CTR 200PUFFS  Allergies  Bactrim  ibuprofen?(Rash)  sulfamethoxazole-trimethoprim  traMADol  trimethoprim?(Rash)  Social History  Alcohol - Denies Alcohol Use, 07/23/2014  Current, 05/03/2017  Employment/School  Unemployed, Other: middle school., 02/28/2015  Exercise  Self assessment: Fair condition., 07/26/2017  Home/Environment  Lives with Siblings. Living situation: Home/Independent. Home equipment: CPAP/BiPAP. Alcohol abuse in household: No. Substance abuse in household: No. Smoker in household: Yes. Injuries/Abuse/Neglect in household: No. Feels unsafe at home: No. Safe place to go: Yes. Family/Friends available for support: Yes. Concern for family members at home: No., 07/25/2018  Nutrition/Health  Regular, Wants to lose weight: No., 07/25/2018  Substance Abuse - Denies Substance Abuse, 11/13/2014  Past, Marijuana, Daily, Previous treatment: None. Drug use interferes with work/home: Yes. Ready to change: Yes. Household substance abuse concerns: Yes., 08/04/2018  Past, Cocaine, Marijuana, 08/04/2018  Tobacco - No Risk, 11/13/2014  Current some day smoker Use:., 08/07/2018  Current every day smoker Use:.  Cigarettes Type:. 5 per day. Started age 31 Years. Ready to change: No., 07/25/2018  Family History  Angina: Mother.  COPD (chronic obstructive pulmonary disease).: Father.  Cancer: Father.  Diabetes mellitus type 2: Mother.  Heart failure.: Mother.  Heart murmur.: Mother.  Hypertension.: Mother, Father and Brother.  Primary malignant neoplasm of lung: Father.  Stroke: Mother and Brother.  Immunizations  Vaccine Date Status Comments   tetanus/diphtheria/pertussis, acel(Tdap) 05/02/2018 Recorded [5/7/2018] Immunization record scanned into chart   influenza virus vaccine, inactivated - Not Given Patient Refuses  pt had injection at another [facility] beginning of flu season   pneumococcal 23-polyvalent vaccine - Not Given Patient Refuses     influenza virus vaccine, inactivated 11/17/2014 Given Med Not Available   pneumococcal 23-polyvalent vaccine 11/17/2014 Given Med Not Available       I was present with the resident during the history and exam.  ? ?  [x? ] I discussed the case with the resident and agree with the findings and plan as documented in the resident?s note.  [ ] I discussed the case with the resident and agree with the findings and plan as documented in the resident?s note except: [ ]  ?

## 2022-09-13 NOTE — HISTORICAL [facility] CERNER
This is a historical note converted from [employer]. Formatting and pictures may have been removed.  Please reference Cer[employer]m for original formatting and attached multimedia. Chief Complaint  pt in via bls, states he is having left sided chest pain around pacemaker, reports drinking and smoking spice last night.. pt weak and dizzy. vomited this morning,  History of Present Illness  57 y/o male with history of Ischemic cardiomyopathy with reduced EF( 16% by lexiscan 8/8/18), NYHA Class III, CAD with MI 2013 with 2 stents placed, AICD placed 3 years ago, ZAMZAM, COPD, Cocaine use comes via ambulance complaining of left sides chest pain over ICD site for a few hours.?Patient states that he had been in usual health over the past few days and last night had shots of alcohol mixed with unknown substance. He states after taking a few of these shots, he felt dizzy, fell into his bed and woke up this am face down and complaining of pain above his pacemaker site. Denies known trauma to area, redness, fever, chills. He does say that the area above the pacemaker has been hurting him intermittently since it was placed but that the pain was worse this am so came to the hospital to have it evaluated. No worsening of shortness of breath from his baseline of 2 blocks, always has slept on 2 pillows when he uses his CPAP machine, no other chest pain except for this pain above the pacemaker when he woke up this am. He says he uses his CPAP at night, uses his inhalors at night, and takes all his medications as prescribed.  ?  Last admission 8/7- NSTEMI, urinary retention, UTI  ED visits? 8/11 for SOB- tx as mild CHF exacerbation and changed antibiotic for UTI as patient was unable to find pharmacy to fill rx; discharged  ????????????? 8/25 for SOB/CP for 2 hours- tx as COPD exacerbation and discharged  ?  Family hx- mother- DM, stroke, HF?;HTN  Social 40-50 pk years smoking tobacco  ????????? 1-2 drinks/week alcohol  ????????? snorts cocaine  few times a week  Surgical- AICD, right elbow fracture repair  Allergy- Bactrim, ibuprofen, sulfa, tramadol  ?  ?  ROS:  No fever, chills, wt loss or gain  Eyes- intermittent dry eyes, no vision changes  Nose/throat- no discharge, sore throat, pain  Cardio- as above  Pulm- no Worsening SOB ( 2 Blocks), always slept on 2 pillows, no PND, no cough  Abd- no pain, diarrhea, constipation  ext- no edema, pain, erythema  Physical Exam  Vitals & Measurements  T:?36.8? ?C (Oral)? HR:?81(Monitored)? HR:?82(Peripheral)? RR:?20? BP:?108/85? SpO2:?99%? WT:?68?kg? WT:?68?kg?  General:?thin male laying in bed, at 30 degrees,?speaking in full sentences,??in no acute distress  Eye: EOMI, clear conjunctiva, eyelids normal  HENT:?no upper teeth, lower teeth with build up of plaque, no gross gingival lesions  Neck: full range of motion, no thyromegaly, no JVD  Respiratory:?no use of accessary muscles, scattered rhonchi bibasilar, more pronounced on right lung field, no wheezes  Cardiovascular:?regular rate and rhythm with 1-2/systolic murmur left lower sternal border, gallops or rubs  Gastrointestinal:?soft, non-tender, non-distended with normal bowel sounds, without masses to palpation  Genitourinary: no CVA tenderness to palpation, no dysuria  Musculoskeletal:?full range of motion of all extremities/spine without limitation or discomfort  Integumentary: left upper chest with ICD device noted, skin is dry and intact, no erythema or warmth. Tenderness to palpation above the ICD.  Neurologic: cranial nerves intact, no signs of peripheral neurological deficit, motor/sensory function intact  ?  ?  ?   Labs:? Pertinent labs  ????????? Potassium 3.0, BUN 19, Creatine 1.4, tbil 1.2, d bile .4, rest of chemistries?noted  ????????? WBC 7.1  ????????? H/H?? 9.3/29.6??? 316,000  ????????? troponin- 043  ????????? pro BNP- 15,525  CXR- heart shadow enlarged, ICD noted, cephalization of flow  EKG- NSR, rate 76, Q waves inferior leads, Left atrial  enlargement, tall R waves anterior leads, no acute ST/T changes  Assessment/Plan  Chest pain  ? Located?above ICD site, no evidence of infection,?likely musculoskeletal but will have cardiology evaluate pacemaker site.  ?  CHF exacerbation  ?Acute on Chronic CHF- ischemic/ severely depressed ejection fraction (18%)  Elevated pro BNP above baseline, cephalization of flow  Patient states he is compliant with his medications but does admit to intermittent cocaine use;?will evaluate for ischemia  Will give lasix 40mg IV now and BID, serial EKGs, troponins  Continue home meds: Coreg, Lisinopril, Spironolactone  ?  VIJI- likely due to CHF and poor flow, will diurese and follow renal function  ?  ?  Hypokalemia- likely secondary to lasix, however patient is on spironolactone and ace inhibitor. Unsure if patient is taking all his medications so will replace with 40 meq and recheck potassium.  ?  CAD- Continue ASA, Plavix with recent NSTEMI this month, statin  ?  ZAMZAM- continue cpap at night  ?  Cocaine/alcohol use- counseled on cessation  ?  DVT prophylaxis- Lovenox 40 mg sq daily, will watch?closely for any bleeding due to other medications?  ?  Ordered:  ?  Orders:  albuterol, 2.5 mg, form: Soln-Inh, NEB, q6hr Resp, first dose 08/31/18 11:00:00 CDT  amiodarone, 200 mg, form: Tab, Oral, Daily, first dose 09/01/18 9:00:00 CDT  aspirin, 81 mg, form: Tab-EC, Oral, Daily, first dose 09/01/18 9:00:00 CDT  atorvastatin, 40 mg, form: Tab, Oral, Daily, first dose 08/31/18 17:00:00 CDT  carvedilol, 3.125 mg, form: Tab, Oral, BIDWMeal, first dose 08/31/18 17:00:00 CDT  clopidogrel, 75 mg, form: Tab, Oral, Daily, first dose 09/01/18 9:00:00 CDT  finasteride, 5 mg, form: Tab, Oral, Daily, first dose 09/01/18 9:00:00 CDT  folic acid, 1 mg, form: Tab, Oral, Daily, first dose 09/01/18 9:00:00 CDT  lisinopril, 10 mg, form: Tab, Oral, Daily, first dose 09/01/18 9:00:00 CDT  nitroglycerin, 0.4 mg, form: Tab-SL, SL, q5min PRN for chest pain,  first dose 08/31/18 9:44:00 CDT  spironolactone, 50 mg, form: Tab, Oral, Daily, first dose 09/01/18 9:00:00 CDT  Electrocardiogram Adult 12 Lead, 08/31/18 15:00:00 CDT, q6hr, 2, dose(s), 09/01/18 2:59:00 CDT, Wheelchair, Patient Has IV, Standard Precautions, NOW, -1, -1, 08/31/18 15:00:00 CDT  Place in Outpatient Observation, 08/31/18 9:00:00 CDT, Pineda GIANG, Jhonny DING Telemetry with Monitor, No   Problem List/Past Medical History  Ongoing  Acute disease or injury-related malnutrition  Alcohol abuse  Asthma  Chest pain  Chest pain  CHF NYHA class II  COPD - Chronic obstructive pulmonary disease  Defibrillator  Depression  Depression(  Confirmed  )  HTN (hypertension)  Knowledge deficit  MI (myocardial infarction)  Morbid obesity  ZAMZAM - Obstructive sleep apnea  ppm  Tobacco user  Tobacco user  Tobacco user  Tobacco user(  Probable Diagnosis  )  Historical  Anxiety  Asthma  Bipolar  CHF (congestive heart failure)  Depression  HLD (hyperlipidemia)  HTN (hypertension)  SOB (shortness of breath)  Procedure/Surgical History  Insertion of Endotracheal Airway into Trachea, Via Natural or Artificial Opening (05/18/2018)  Insertion of Infusion Device into Superior Vena Cava, Percutaneous Approach (05/18/2018)  Respiratory Ventilation, 24-96 Consecutive Hours (05/18/2018)  Ultrasonography of Superior Vena Cava, Guidance (05/18/2018)  Detoxification Services for Substance Abuse Treatment (04/08/2017)  Fluoroscopy of Left Heart using Low Osmolar Contrast (03/11/2016)  Fluoroscopy of Multiple Coronary Arteries using Low Osmolar Contrast (03/11/2016)  Measurement of Cardiac Sampling and Pressure, Left Heart, Percutaneous Approach (03/11/2016)  CARDIOVERTER-DEFIBRILLATOR, SINGLE CHAMBER (IMPLANTABLE) (07/10/2015)  Implantation or replacement of automatic cardioverter/defibrillator, total system [AICD] (07/10/2015)  Insertion of Antimicrobial Envelope (07/10/2015)  Insertion or replacement of permanent implantable defibrillator  system, with transvenous lead(s), single or dual chamber (07/10/2015)  Insertion Single/Dual Desiree Pulse Generator Leads (None) (07/10/2015)  LEAD, CARDIOVERTER-DEFIBRILLATOR, ENDOCARDIAL SINGLE COIL (IMPLANTABLE) (07/10/2015)  Insertion of coronary artery stent (12/04/2014)  Coronary arteriography using a single catheter (11/17/2014)  Left heart cardiac catheterization (11/17/2014)  Left Heart Cath w/COR Angio Ventriculogr (None) (11/17/2014)  Application of finger splint; dynamic. (09/04/2014)  Application of splint (09/04/2014)  Right elbow fracture repair (03/03/2009)  Tendon repair left forearm (02/02/2003)  cardiac stents x 2  RT ARM FX REPAIR   Medications  Inpatient  albuterol 2.5 mg/3 mL (0.083%) inhalation solution, 2.5 mg= 3 mL, NEB, q6hr Resp  amiodarone 200 mg oral Tab, 200 mg= 1 tab(s), Oral, Daily  aspirin, 81 mg= 1 tab(s), Oral, Daily  atorvastatin 20 mg oral tablet, 40 mg= 2 tab(s), Oral, Daily  azithromycin 250 mg oral tablet, 500 mg= 2 tab(s), Oral, Once  Coreg 3.125 mg oral tablet, 3.125 mg= 1 tab(s), Oral, BIDWMeal  finasteride 5 mg oral tablet, 5 mg= 1 tab(s), Oral, Daily  folic acid 1 mg oral tablet, 1 mg= 1 tab(s), Oral, Daily  Lasix, 40 mg= 4 mL, IV Push, BID  lisinopril 10 mg oral tablet, 10 mg= 1 tab(s), Oral, Daily  Lovenox, 40 mg= 0.4 mL, Subcutaneous, Daily  nitroglycerin 0.4 mg sublingual TAB, 0.4 mg= 1 tab(s), SL, q5min, PRN  Plavix 75 mg oral tablet, 75 mg= 1 tab(s), Oral, Daily  spironolactone, 50 mg= 2 tab(s), Oral, Daily  Home  Advil PM Liqui-Gels 25 mg-200 mg oral capsule, 2 cap(s), Oral, Once a day (at bedtime), PRN  alPRAzolam 1 mg oral tablet, 1 mg= 1 tab(s), Oral, BID, 1 refills  AMIODARONE 200MG TABLETS, 200 mg= 1 tab(s), Oral, Daily  ASPIRIN 81MG EC LOW DOSE TABLETS, 81 mg= 1 tab(s), Oral, Daily  atorvastatin 40 mg oral tablet, 40 mg= 1 tab(s), Oral, Daily, 3 refills,? ?Not taking  BIPAP, See Instructions  BOOSTRIX INJ 0.5ML, IM, As Directed,? ?Not taking  Coreg 3.125 mg  oral tablet, 3.125 mg= 1 tab(s), Oral, BIDWMeal  finasteride 5 mg oral tablet, 5 mg= 1 tab(s), Oral, Daily, 3 refills  folic acid 1 mg oral tablet, 1 mg= 1 tab(s), Oral, Daily,? ?Not taking  furosemide 40 mg oral tablet, See Instructions, 3 refills,? ?Not taking  HYDROCODONE/ACETAMINOPHEN 7.5-325 T, 1 tab(s), Oral, q8hr,? ?Not taking  Lasix 40 mg oral tablet, 40 mg= 1 tab(s), Oral, BID  lisinopril 10 mg oral tablet, 10 mg= 1 tab(s), Oral, Daily, 3 refills  nitroglycerin 0.4 mg sublingual TAB, 0.4 mg= 1 tab(s), SL, q5min, PRN, 1 refills  Plavix 75 mg oral tablet, 75 mg= 1 tab(s), Oral, Daily, 3 refills  potassium chloride 99 mg oral tablet, 99 mg= 1 tab(s), Oral, Daily  spironolactone 50 mg oral tablet, 50 mg= 1 tab(s), Oral, Daily  Ventolin HFA 90 mcg/inh inhalation aerosol, 2 puff(s), INH, QID, 1 refills  VENTOLIN HFA INH W/DOS CTR 200PUFFS  Allergies  Bactrim  ibuprofen?(Rash)  sulfamethoxazole-trimethoprim  traMADol  trimethoprim?(Rash)  Social History  Alcohol - Denies Alcohol Use, 07/23/2014  Current, 05/03/2017  Employment/School  Unemployed, Other: middle school., 02/28/2015  Exercise  Self assessment: Fair condition., 07/26/2017  Home/Environment  Lives with Siblings. Living situation: Home/Independent. Home equipment: CPAP/BiPAP. Alcohol abuse in household: No. Substance abuse in household: No. Smoker in household: Yes. Injuries/Abuse/Neglect in household: No. Feels unsafe at home: No. Safe place to go: Yes. Family/Friends available for support: Yes. Concern for family members at home: No., 07/25/2018  Nutrition/Health  Regular, Wants to lose weight: No., 07/25/2018  Substance Abuse - Denies Substance Abuse, 11/13/2014  Past, Marijuana, Daily, Previous treatment: None. Drug use interferes with work/home: Yes. Ready to change: Yes. Household substance abuse concerns: Yes., 08/04/2018  Past, Cocaine, Marijuana, 08/04/2018  Tobacco - No Risk, 11/13/2014  Current some day smoker Use:., 08/07/2018  Current every  day smoker Use:. Cigarettes Type:. 5 per day. Started age 31 Years. Ready to change: No., 07/25/2018  Family History  Angina: Mother.  COPD (chronic obstructive pulmonary disease).: Father.  Cancer: Father.  Diabetes mellitus type 2: Mother.  Heart failure.: Mother.  Heart murmur.: Mother.  Hypertension.: Mother, Father and Brother.  Primary malignant neoplasm of lung: Father.  Stroke: Mother and Brother.  Immunizations  Vaccine Date Status Comments   tetanus/diphtheria/pertussis, acel(Tdap) 05/02/2018 Recorded [5/7/2018] Immunization record scanned into chart   influenza virus vaccine, inactivated - Not Given Patient Refuses  pt had injection at another [facility] beginning of flu season   pneumococcal 23-polyvalent vaccine - Not Given Patient Refuses     influenza virus vaccine, inactivated 11/17/2014 Given Med Not Available   pneumococcal 23-polyvalent vaccine 11/17/2014 Given Med Not Available       Addendum  Anemia- normochromic, normocytic  ??????????? Has been stable over past 3 months  ??????????? will workup with iron panel, B12, folic acid levels, peripheral smear

## 2022-09-13 NOTE — HISTORICAL [facility] CERNER
This is a historical note converted from Guille. Formatting and pictures may have been removed.  Please reference Guille for original formatting and attached multimedia. Chief Complaint:Chest pain x4 hours  ?   History of Present Illness  ?   60 y/o AAM with PMHhypertension, HFrEF of (10% in 12/18) status post ICD ([facility]), coronary artery disease status post stents placed??2 in November 2014, ZAMZAM on CPAP at night, chronic smoker and cocaine user?presents to the ED?with complaints of?left-sided?8/10?pressure-like?chest pain that radiates to left?shoulder?for?4 hours.? Patient states he was sitting down watching TV?at the [employer]?when he started to experience?this chest pain.? He took a nitroglycerin tablet?which did not alleviate his symptoms.? His chest pain remained constant and lasted for more than 30 minutes so EMS was called which brought him to the ED.? He states his pain is different?than his?occasional chest pain he gets daily as this time?it was not alleviated with nitroglycerin.? He states?he typically gets chest pain?with exertion?and it is self-limited.? Chest pain today is associated with dyspnea, diaphoresis.? Not associated with nausea or vomiting?or palpitations.? He states he has not used cocaine in over 1 month?and denies?any other drug use.? Denies headache,?cough, lower extremity edema, orthopnea, PND, diarrhea, dysuria. Of note, patient is a poor historian. He also has a h/o PEC with inpatient psych due to suicidal ideation. He also reports recent stenting in novemeber although no records can be found.  ?   ED course: Patients vitals were stable on presentation?temp 98.4, heart rate 107, respiration 19,?90% on room air, /100. ?Troponin slightly elevated at 0.05, potassium 3.3, UDS positive for barbiturates and benzos although patient denies illicit drug use. ?CXR shows cardiomegaly mild cephalization, no effusion. ?EKG shows sinus tach rate 107, LVH, no ST elevation or depression.  Medicine consutled to r/o ACS due to significant cardiac history in setting of troponinemia,  ?  PMH: hypertension, HFrEF of (10% in 12/18) status post ICD ([facility]), CAD s/p stenting ?2 in November 2014, ZAMZAM on CPAP at night, Bipolar disorder  ?  PSX: CD Device Placement, Elbow Surgery, PCI LAD, Stenting of Left Subclavian artery  ?  FMH: Mother - HTN, Stroke, Heart Failure, Diabetes; Father - HTN, COPD, Lung Ca.; Brother - HTN, CVA  ?  Allergies: traMADol; Bactrim; sulfamethoxazole-trimethoprim; trimethoprim; ibuprofen; codeine  ?   Social  Tobacco: Current smoker  ETOH abuse: Denies  Illicit drug use: Admits to cocaine abuse, last use 1 month ago  ?   ROS  General:?no fever, no night sweats, chills, fatigue, changes in weight.  Skin: no rashes, bruises, petechia  HEENT: no headache, head injury, no acute vision changes.  CVS: +chest pain, no palpitations, orthopnea, PND, edema  Resp: +SOB, no cough, wheezing  GI: no dysphagia, melena, hematochezia, abdominal pain, nausea, vomiting, constipation.  : no dysuria, hematuria, polyuria, CVA pain, nocturia  Musculoskeletal: +L hand pain.?no joint stiffness, pain, swelling or weakness  Neuro: no headaches, syncope, seizures, numbness, tingling, vertigo, dizziness  ?  Medicine list  Home Medications (26) Active  Al hydrox./Mg hydrox./simethicone 400 mg-400 mg-40 mg/5 mL oral susp (Maalox Max) UD?See Instructions  alPRAzolam 1 mg oral tablet?1 mg = 1 tab(s), Oral, q12hr  atorvastatin 40 mg oral tablet?40 mg = 1 tab(s), Oral, Daily  doxepin 50 mg oral capsule?50 mg = 1 cap(s), Oral, Daily  finasteride 5 mg oral tablet?5 mg = 1 tab(s), Oral, Daily  FLUZONE QUAD 2018-19 INJ 0.5ML?, IM, As Directed  furosemide 40 mg oral tablet?40 mg = 1 tab(s), Oral, BID  gabapentin 100 mg oral capsule?200 mg = 2 cap(s), Oral, BID  GlucaGen 1 mg injection?, IV, Once  HumuLIN R 100 units/mL injectable solution?, Subcutaneous  HYDROCODONE/ACETAMINOPHEN 5-325 TB?  Imodium 2 mg oral tablet?See  Instructions  IPRATROPI/ALB 0.5/3MG INH SL 60X3ML?, NEB, q6hr  lisinopril 10 mg oral tablet?10 mg = 1 tab(s), Oral, Daily  NITROGLYCERIN 0.4MG SUB TAB 25?  oxcarbazepine 300 mg oral tablet?300 mg = 1 tab(s), Oral, TID  POTASSIUM CL 20MEQ ER TABLETS?20 mEq = 1 tab(s), Oral, BID  risperiDONE?1 tab(s), Oral, At Bedtime  sertraline 25 mg oral tablet?50 mg = 2 tab(s), Oral, Daily  Tylenol?325 mg, Oral, q4hr  Ventolin HFA 90 mcg/inh inhalation aerosol?2 puff(s), INH, QID  Vistaril 25 mg oral capsule?25 mg = 1 cap(s), Oral, BID  ?  ?  Physical exam  Vitals: Temperature????????No result  Systolic Blood Pressure????????129 ???(02:05)  Diastolic Blood Pressure????????83 ???(02:05)  Pulse????????86 ???(02:05)  SpO2????????95 ???(02:05)  Respiratory Rate????????18 ???(02:05)  ?  ?  ?  General: AAOx3, NAD, alert and cooperative. Lying comfortably in bed eating and talking on cellphone, c/o 8/10 pain  HEENT: PERRLA, EOMI  Neck: no LAD, no JVD, supple, no masses or thyromegaly  CVS: S1/S2 nml,?tachycardic, no murmurs, rubs or gallops, no carotid bruits. Displaced PMI. Chest pain illicited with palpation over left anterior chest.  Resp: CTA b/l, no wheezing  GI: Not distended, not tender, BS+, no guarding  Skin: not jaundiced, warm, no rashes  Musculoskeletal: normal ROM, no joint tenderness, normal muscular development  Extremities: No edema, peripheral pulses intact  Neuro: CN II-XII grossly intact, strength and sensation symmetric and intact throughout, no focal neurological deficits  ?  Labs  Labs Last 24 Hours?  ?Chemistry? Hematology/Coagulation?   Sodium Lvl: 136 mmol/L (01/23/19 22:30:05) WBC: 5.7 x10(3)/mcL (01/23/19 22:30:05)   Potassium Lvl:?3.3 mmol/L?Low (01/23/19 22:30:05) RBC:?3.71 x10(6)/mcL?Low (01/23/19 22:30:05)   Chloride: 103 mmol/L (01/23/19 22:30:05) Hgb:?9.3 gm/dL?Low (01/23/19 22:30:05)   CO2: 25 mmol/L (01/23/19 22:30:05) Hct:?30 %?Low (01/23/19 22:30:05)   Calcium Lvl:?8.3 mg/dL?Low (01/23/19 22:30:05)  Platelet: 380 x10(3)/mcL (01/23/19 22:30:05)   Glucose Lvl: 79 mg/dL (01/23/19 22:30:05) MCV: 80.9 fL (01/23/19 22:30:05)   BUN: 13 mg/dL (01/23/19 22:30:05) MCH:?25.1 pg?Low (01/23/19 22:30:05)   Creatinine: 1.1 mg/dL (01/23/19 22:30:05) MCHC: 31 gm/dL (01/23/19 22:30:05)   eGFR-AA:?88 mL/min?Low (01/23/19 22:30:05) RDW:?20.4 %?High (01/23/19 22:30:05)   eGFR-BELKYS:?73 mL/min?Low (01/23/19 22:30:05) MPV: 9 fL (01/23/19 22:30:05)   Bili Total: 0.3 mg/dL (01/23/19 22:30:05) Abs Neut: 3.22 x10(3)/mcL (01/23/19 22:30:05)   Bili Direct: 0.2 mg/dL (01/23/19 22:30:05) Segs Man: 53 % (01/23/19 22:30:05)   Bili Indirect: 0.1 mg/dL (01/23/19 22:30:05) Band Man: 1 % (01/23/19 22:30:05)   AST: 30 unit/L (01/23/19 22:30:05) Lymph Man: 34 % (01/23/19 22:30:05)   ALT: 30 unit/L (01/23/19 22:30:05) Monocyte Man:?12 %?High (01/23/19 22:30:05)   Alk Phos: 88 unit/L (01/23/19 22:30:05) Eos Man: 0 % (01/23/19 22:30:05)   Total Protein: 8 gm/dL (01/23/19 22:30:05) Basophil Man: 0 % (01/23/19 22:30:05)   Albumin Lvl: 3.8 gm/dL (01/23/19 22:30:05) Platelet Est: Normal (01/23/19 22:30:05)   Globulin:?4.2 gm/mL?High (01/23/19 22:30:05) Anisocyte: 1+ (01/23/19 22:30:05)   A/G Ratio:?0.9 ratio?Low (01/23/19 22:30:05) Polychrom: 1+ (01/23/19 22:30:05)   NT pro BNP.:?3059 pg/mL?High (01/23/19 22:30:05) RBC Morph: Abnormal (01/23/19 22:30:05)   Lipase Lvl: 126 unit/L (01/23/19 22:30:05) ?   Total CK:?317 unit/L?High (01/23/19 22:30:05) ?   CK MB: 2.8 ng/mL (01/23/19 22:30:05) ?   Troponin-I:?0.055 ng/mL?High (01/23/19 22:30:05) ?   U pH: 5.5 (01/23/19 22:34:00) ?   ?  ?  ?  Radiology  CXR: By my read, cardiomegaly with mild cephalization. No evidence of consolidation or effusion.  ?  Assessment and Plan  ?  1) Atypical chest pain  -Left sided, constant, 8/10, radiating to L shoulder, not alleviated with nitro  - States his usual chest pain is brought on with exertion, but today he experienced it when sitting on couch  - Given Nitro in ED, states  no relief  - Also given   - Patient has significant cardiac history, will admit to monitor  - Pain has pleuritic component, as it was illicited with palpation.  - Will give nitrogylcerin PRN along with tylenol  - Patient is requesting Norco at this time. Will hold as it is not appropriate.  ?  2) Troponinemia  - Slightly elevated at 0.05; per chart review patient tends to have chronically elevated troponin with this being around his baseline  - Last labs show elevated @ 0.05 on 1/7/19  - Will continue to trend x2 along with serial EKG q6h  - Will start ACS protocol if troponin increases or if there is associated EKG changes  ?  3) Hypokalemia  - Will replenish with 40 PO  - recheck in AM  - Likely 2/2 to diuretic usage  ?  4) HFrEF (EF 10%)  - Ischemic cardiomyopathy  - Last TTE done 12/18 showed severe global hypokenesis and EF 10%  - S/p [facility] ICD placement  - Followed by [facility]  - Not fluid overloaded on exam; warm and dry  - Continue home meds lisinopril, lasix  - Patient currently not taking BB as outpatient. records show during last hospitalization in 12/2018 he was on coreg 3.125, but however he is no longer taking  - Will restart coreg 3.125 BID  - f/u mg and phos in AM  ?  5) CAD s/p stenting x2  - Last [facility] (3/16): Patent Stented Prox/Mid LAD, LAD 40%, RCA Proximal 30%, Mid Circ 40%  - Continue ASA, Statin  ?  6) HTN  - Continue home medications  ?  7) ZAMZAM  - Continue? CPAP while asleep  ?  8) Polysubstance abuse  - Patient endorses use of cocaine last month, denies other illicit drugs  - UDS positive for barbituates  - Strongly encouraged to refrain from illicit drugs  ?  9) Bipolar d/o, anxiety, depression  - Continue home meds  ?  Prophylaxis: Lovenox  ?  Disposition: Admit to telemetry. Trend troponins and ekgs. Troponin elevation likely 2/2 chronic demand ischemia given significant cardiac history.  ?   I have reviewed the patients history, residents? findings on physical examination, diagnosis  and treatment plan. Care provided was reasonable and necessary.

## 2024-03-12 NOTE — PROGRESS NOTES
NCC notified pt refusing treatment, climbing out bed, states he wants to leave. Pt complains of chest pain and cannot be reasoned with.Pt states he didn't have a stroke, and he didn't fall. States he only went into heart failure. Cardiology consult placed, pt reconnected to monitor. NSR HR 70s, 2mg morphine IV given, 2mg versed given per NCC at bedside. Precedex gtt started. Will continue to monitor closely.    done